# Patient Record
Sex: FEMALE | Race: BLACK OR AFRICAN AMERICAN | NOT HISPANIC OR LATINO | Employment: UNEMPLOYED | URBAN - METROPOLITAN AREA
[De-identification: names, ages, dates, MRNs, and addresses within clinical notes are randomized per-mention and may not be internally consistent; named-entity substitution may affect disease eponyms.]

---

## 2017-05-26 ENCOUNTER — HOSPITAL ENCOUNTER (EMERGENCY)
Facility: HOSPITAL | Age: 37
Discharge: HOME | End: 2017-05-26
Admitting: EMERGENCY MEDICINE
Payer: COMMERCIAL

## 2017-05-26 VITALS
TEMPERATURE: 98.6 F | BODY MASS INDEX: 25.61 KG/M2 | DIASTOLIC BLOOD PRESSURE: 73 MMHG | WEIGHT: 150 LBS | RESPIRATION RATE: 18 BRPM | HEART RATE: 98 BPM | HEIGHT: 64 IN | OXYGEN SATURATION: 100 % | SYSTOLIC BLOOD PRESSURE: 116 MMHG

## 2017-05-26 DIAGNOSIS — K64.9 HEMORRHOIDS, UNSPECIFIED HEMORRHOID TYPE: Primary | ICD-10-CM

## 2017-05-26 DIAGNOSIS — R30.0 DYSURIA: ICD-10-CM

## 2017-05-26 LAB
BILIRUB UR QL STRIP: NEGATIVE
CLARITY UR: CLEAR
COLOR UR: YELLOW
GLUCOSE UR STRIP-MCNC: NEGATIVE MG/DL
HCG UR QL: NORMAL
HGB UR QL STRIP.AUTO: NEGATIVE
KETONES UR STRIP-MCNC: NEGATIVE MG/DL
LEUKOCYTE ESTERASE UR QL STRIP: NEGATIVE
NITRITE UR QL STRIP: NEGATIVE
PH UR STRIP.AUTO: 6 [PH] (ref 4.5–8)
PROT UR STRIP-MCNC: NEGATIVE MG/DL
SP GR UR STRIP.AUTO: 1.02 (ref 1–1.03)
UROBILINOGEN UR QL STRIP.AUTO: 0.2 E.U./DL

## 2017-05-26 PROCEDURE — 81003 URINALYSIS AUTO W/O SCOPE: CPT | Performed by: NURSE PRACTITIONER

## 2017-05-26 PROCEDURE — 81025 URINE PREGNANCY TEST: CPT | Performed by: NURSE PRACTITIONER

## 2017-05-26 PROCEDURE — 99283 EMERGENCY DEPT VISIT LOW MDM: CPT

## 2017-05-26 RX ORDER — HYDROCORTISONE ACETATE 25 MG/1
25 SUPPOSITORY RECTAL 2 TIMES DAILY
Qty: 12 SUPPOSITORY | Refills: 0 | Status: SHIPPED | OUTPATIENT
Start: 2017-05-26 | End: 2017-10-10

## 2017-05-26 RX ORDER — VENLAFAXINE HYDROCHLORIDE 150 MG/1
150 CAPSULE, EXTENDED RELEASE ORAL DAILY
COMMUNITY
End: 2018-06-11 | Stop reason: SDUPTHER

## 2017-05-26 RX ORDER — METHOCARBAMOL 500 MG/1
500 TABLET, FILM COATED ORAL 4 TIMES DAILY
COMMUNITY
End: 2017-10-10

## 2017-05-26 RX ORDER — PHENAZOPYRIDINE HYDROCHLORIDE 100 MG/1
100 TABLET, FILM COATED ORAL 2 TIMES DAILY PRN
Qty: 4 TABLET | Refills: 0 | Status: SHIPPED | OUTPATIENT
Start: 2017-05-26 | End: 2017-05-30

## 2017-05-26 RX ORDER — TRAMADOL HYDROCHLORIDE 50 MG/1
50 TABLET ORAL EVERY 8 HOURS PRN
COMMUNITY
End: 2017-10-10

## 2017-05-26 RX ORDER — GABAPENTIN 300 MG/1
300 CAPSULE ORAL 3 TIMES DAILY
COMMUNITY
End: 2017-10-10

## 2017-06-16 ENCOUNTER — APPOINTMENT (OUTPATIENT)
Dept: LAB | Facility: CLINIC | Age: 37
End: 2017-06-16
Payer: COMMERCIAL

## 2017-06-16 DIAGNOSIS — E78.5 HYPERLIPIDEMIA: ICD-10-CM

## 2017-06-16 LAB
ALBUMIN SERPL BCP-MCNC: 4.1 G/DL (ref 3.5–5)
ALP SERPL-CCNC: 68 U/L (ref 46–116)
ALT SERPL W P-5'-P-CCNC: 19 U/L (ref 12–78)
ANION GAP SERPL CALCULATED.3IONS-SCNC: 7 MMOL/L (ref 4–13)
AST SERPL W P-5'-P-CCNC: 12 U/L (ref 5–45)
BASOPHILS # BLD AUTO: 0.02 THOUSANDS/ΜL (ref 0–0.1)
BASOPHILS NFR BLD AUTO: 0 % (ref 0–1)
BILIRUB SERPL-MCNC: 0.37 MG/DL (ref 0.2–1)
BUN SERPL-MCNC: 21 MG/DL (ref 5–25)
CALCIUM SERPL-MCNC: 9.4 MG/DL (ref 8.3–10.1)
CHLORIDE SERPL-SCNC: 108 MMOL/L (ref 100–108)
CHOLEST SERPL-MCNC: 203 MG/DL (ref 50–200)
CO2 SERPL-SCNC: 25 MMOL/L (ref 21–32)
CREAT SERPL-MCNC: 0.71 MG/DL (ref 0.6–1.3)
EOSINOPHIL # BLD AUTO: 0.06 THOUSAND/ΜL (ref 0–0.61)
EOSINOPHIL NFR BLD AUTO: 1 % (ref 0–6)
ERYTHROCYTE [DISTWIDTH] IN BLOOD BY AUTOMATED COUNT: 14.1 % (ref 11.6–15.1)
GFR SERPL CREATININE-BSD FRML MDRD: >60 ML/MIN/1.73SQ M
GLUCOSE P FAST SERPL-MCNC: 82 MG/DL (ref 65–99)
HCT VFR BLD AUTO: 40 % (ref 34.8–46.1)
HDLC SERPL-MCNC: 53 MG/DL (ref 40–60)
HGB BLD-MCNC: 12.9 G/DL (ref 11.5–15.4)
LDLC SERPL CALC-MCNC: 129 MG/DL (ref 0–100)
LYMPHOCYTES # BLD AUTO: 2.82 THOUSANDS/ΜL (ref 0.6–4.47)
LYMPHOCYTES NFR BLD AUTO: 44 % (ref 14–44)
MCH RBC QN AUTO: 27.3 PG (ref 26.8–34.3)
MCHC RBC AUTO-ENTMCNC: 32.3 G/DL (ref 31.4–37.4)
MCV RBC AUTO: 85 FL (ref 82–98)
MONOCYTES # BLD AUTO: 0.49 THOUSAND/ΜL (ref 0.17–1.22)
MONOCYTES NFR BLD AUTO: 8 % (ref 4–12)
NEUTROPHILS # BLD AUTO: 2.99 THOUSANDS/ΜL (ref 1.85–7.62)
NEUTS SEG NFR BLD AUTO: 47 % (ref 43–75)
NRBC BLD AUTO-RTO: 0 /100 WBCS
PLATELET # BLD AUTO: 288 THOUSANDS/UL (ref 149–390)
PMV BLD AUTO: 10.4 FL (ref 8.9–12.7)
POTASSIUM SERPL-SCNC: 3.6 MMOL/L (ref 3.5–5.3)
PROT SERPL-MCNC: 7.8 G/DL (ref 6.4–8.2)
RBC # BLD AUTO: 4.72 MILLION/UL (ref 3.81–5.12)
SODIUM SERPL-SCNC: 140 MMOL/L (ref 136–145)
TRIGL SERPL-MCNC: 107 MG/DL
TSH SERPL DL<=0.05 MIU/L-ACNC: 2.11 UIU/ML (ref 0.36–3.74)
WBC # BLD AUTO: 6.38 THOUSAND/UL (ref 4.31–10.16)

## 2017-06-16 PROCEDURE — 36415 COLL VENOUS BLD VENIPUNCTURE: CPT

## 2017-06-16 PROCEDURE — 80061 LIPID PANEL: CPT

## 2017-06-16 PROCEDURE — 85025 COMPLETE CBC W/AUTO DIFF WBC: CPT

## 2017-06-16 PROCEDURE — 80053 COMPREHEN METABOLIC PANEL: CPT

## 2017-06-16 PROCEDURE — 84443 ASSAY THYROID STIM HORMONE: CPT

## 2017-06-19 ENCOUNTER — ALLSCRIPTS OFFICE VISIT (OUTPATIENT)
Dept: OTHER | Facility: OTHER | Age: 37
End: 2017-06-19

## 2017-10-10 ENCOUNTER — HOSPITAL ENCOUNTER (EMERGENCY)
Facility: HOSPITAL | Age: 37
Discharge: HOME | End: 2017-10-10
Attending: EMERGENCY MEDICINE | Admitting: EMERGENCY MEDICINE
Payer: COMMERCIAL

## 2017-10-10 ENCOUNTER — APPOINTMENT (EMERGENCY)
Dept: RADIOLOGY | Facility: HOSPITAL | Age: 37
End: 2017-10-10
Payer: COMMERCIAL

## 2017-10-10 VITALS
TEMPERATURE: 98.4 F | RESPIRATION RATE: 16 BRPM | OXYGEN SATURATION: 100 % | HEART RATE: 92 BPM | SYSTOLIC BLOOD PRESSURE: 99 MMHG | DIASTOLIC BLOOD PRESSURE: 62 MMHG | HEIGHT: 64 IN | WEIGHT: 153.66 LBS | BODY MASS INDEX: 26.23 KG/M2

## 2017-10-10 DIAGNOSIS — S63.91XA HAND SPRAIN, RIGHT, INITIAL ENCOUNTER: Primary | ICD-10-CM

## 2017-10-10 PROCEDURE — 73110 X-RAY EXAM OF WRIST: CPT

## 2017-10-10 PROCEDURE — 99283 EMERGENCY DEPT VISIT LOW MDM: CPT

## 2017-10-10 PROCEDURE — 73130 X-RAY EXAM OF HAND: CPT

## 2017-10-11 NOTE — DISCHARGE INSTRUCTIONS
Return to the Emergency Department sooner if increased pain, swelling, numbness, weakness, vomiting, difficulty breathing, redness  Ice, elevate  Sprain   WHAT YOU NEED TO KNOW:   A sprain happens when a ligament is stretched or torn  Ligaments are tough tissues that connect bones  Ligaments support your joints and keep your bones in place  They allow you to lift, lower, or rotate your arms and legs  A sprain may involve one or more ligaments  DISCHARGE INSTRUCTIONS:   Seek care immediately if:   · You have numbness or tingling below the injury, such as in your fingers or toes  · The skin over your sprained area is blue or pale  · Your pain has increased or returned, even after you take pain medicine  Contact your healthcare provider if:   · Your symptoms do not better  · Your swelling has increased or returned  · Your joint becomes more weak or unstable  · You have questions or concerns about your condition or care  Medicines:   · Prescription pain medicine  may be given  Ask how to take this medicine safely  · Acetaminophen  decreases pain and fever  It is available without a doctor's order  Ask how much to take and how often to take it  Follow directions  Acetaminophen can cause liver damage if not taken correctly  · NSAIDs , such as ibuprofen, help decrease swelling, pain, and fever  This medicine is available with or without a doctor's order  NSAIDs can cause stomach bleeding or kidney problems in certain people  If you take blood thinner medicine, always ask your healthcare provider if NSAIDs are safe for you  Always read the medicine label and follow directions  · Take your medicine as directed  Contact your healthcare provider if you think your medicine is not helping or if you have side effects  Tell him or her if you are allergic to any medicine  Keep a list of the medicines, vitamins, and herbs you take  Include the amounts, and when and why you take them   Bring the list or the pill bottles to follow-up visits  Carry your medicine list with you in case of an emergency  Support devices:  Support services, such as an elastic bandage, splint, brace, or cast may be needed  These devices limit your movement and protect your joint  You may need to use crutches if the sprain is in your leg  This will help decrease your pain as you move around  Self-care:   · Rest  your joint so that it can heal  Return to normal activities as directed  · Apply ice  on your injury for 15 to 20 minutes every hour or as directed  Use an ice pack, or put crushed ice in a plastic bag  Cover it with a towel  Ice helps prevent tissue damage and decreases swelling and pain  · Compress  the injured area as directed  Ask your healthcare provider if you should wrap an elastic bandage around your injured ligament  An elastic bandage provides support and helps decrease swelling and movement so your joint can heal      · Elevate  the injured area above the level of your heart as often as you can  This will help decrease swelling and pain  Prop the injured area on pillows or blankets to keep it elevated comfortably  Physical therapy:  A physical therapist teaches you exercises to help improve movement and strength, and to decrease pain  Prevent another sprain:  Regular exercise can strengthen your muscles and help prevent another injury  Do the following before you begin or return to regular exercise or sports training:  · Ask your healthcare provider about the activities you can do  Find out how long your ligament needs to heal  Do not do any physical activity until your healthcare provider says it is okay  If you start activity too soon, you may develop a more serious injury  · Always warm up and stretch  before your regular exercise, sport, or physical activity  · Take it slow  Slowly increase how often and how long you exercise or train   Sudden increases in how often you train may cause you to overstretch or tear your ligament  · Use the right equipment  Always wear shoes that fit well and are made for the activity that you are doing  You may also use ankle supports, elbow and knee pads, or braces  Follow up with your healthcare provider as directed:  Write down your questions so you remember to ask them during your visits  © 2017 [address] [employer] Information is for End User's use only and may not be sold, redistributed or otherwise used for commercial purposes  All illustrations and images included in CareNotes® are the copyrighted property of [employer]  or Luan Miles  The above information is an  only  It is not intended as medical advice for individual conditions or treatments  Talk to your doctor, nurse or pharmacist before following any medical regimen to see if it is safe and effective for you  Jammed Finger   WHAT YOU NEED TO KNOW:   A jammed finger is an injury to the tendon that straightens the tip of your finger  A piece of bone may be pulled away with your tendon  Your injury may take 4 to 8 weeks to heal   DISCHARGE INSTRUCTIONS:   Medicines: You may need any of the following:  · Acetaminophen  decreases pain  It is available without a doctor's order  Ask how much to take and how often to take it  Follow directions  Acetaminophen can cause liver damage if not taken correctly  · NSAIDs , such as ibuprofen, help decrease swelling, pain, and fever  This medicine is available with or without a doctor's order  NSAIDs can cause stomach bleeding or kidney problems in certain people  If you take blood thinner medicine, always ask if NSAIDs are safe for you  Always read the medicine label and follow directions  Do not give these medicines to children under 10months of age without direction from your child's healthcare provider  · Take your medicine as directed    Contact your healthcare provider if you think your medicine is not helping or if you have side effects  Tell him if you are allergic to any medicine  Keep a list of the medicines, vitamins, and herbs you take  Include the amounts, and when and why you take them  Bring the list or the pill bottles to follow-up visits  Carry your medicine list with you in case of an emergency  Self-care:   · Apply ice  on your finger for 15 to 20 minutes every hour or as directed  Use an ice pack, or put crushed ice in a plastic bag  Cover it with a towel  Ice helps prevent tissue damage and decreases swelling and pain  · Elevate  your hand above the level of your heart as often as you can  This will help decrease swelling and pain  Prop your hand on pillows or blankets to keep it elevated comfortably  · Immobilize  your finger  A splint will be placed on your finger to keep it straight while it heals  You may need to wear this splint for 6 to 8 weeks  You may need to continue to use the splint during sports activities for another 6 to 8 weeks  Splint care:   · You may remove the splint each day to wash your finger  · When your splint is off, do not try to bend the tip of your finger  · Put your splint back on as soon as possible  When you apply new tape, make sure the splint is in the same place and position  You may also need new tape if the splint gets wet  If your finger is numb or tingling, the splint may be too tight  Loosen the tape so your finger is comfortable  Contact your healthcare provider if:   · You have pain or swelling that is getting worse  · You have increased redness and swelling in your finger  · Your finger feels numb, tingly, or cold, even after you loosen the splint  · Your finger looks white or blue and feels cold  · You have questions or concerns about your condition or care  Seek care immediately or call 911 if:   · You have blood beneath your nail, or your nail is not fully attached      © 2017 [employer] Information is for End User's use only and may not be sold, redistributed or otherwise used for commercial purposes  All illustrations and images included in CareNotes® are the copyrighted property of [employer]  or Luan Miles  The above information is an  only  It is not intended as medical advice for individual conditions or treatments  Talk to your doctor, nurse or pharmacist before following any medical regimen to see if it is safe and effective for you

## 2017-10-11 NOTE — ED PROVIDER NOTES
History  Chief Complaint   Patient presents with    Hand Pain     Pt c/o right hand, wrist and arm pain after jamming it into a banister railing after tripping  This is a 73-year-old female patient who jammed her right hand into a railing  Pain immediately afterwards shooting up into her elbow  She is right-hand dominant  She has no prior injuries to the right hand  At this point time the pain is improving  She initially had numbness which has since resolved  She still has full range of motion of the right wrist and all digits of the right hand  No swelling, no erythema, no abrasions or lacerations  She is otherwise feeling well  No significant past medical history  Does not take any anticoagulants or antiplatelets        History provided by:  Patient   used: No    Hand Pain   Location:  Right hand  Quality:  Aching  Severity:  Mild  Onset quality:  Gradual  Duration: worse over past 1 day  Timing:  Constant  Progression:  Unchanged  Chronicity:  New  Context:  Jammed finger  Relieved by:  Rest  Worsened by: Movement and palpation  Associated symptoms: no abdominal pain, no chest pain, no congestion, no cough, no diarrhea, no ear pain, no fatigue, no fever, no headaches, no loss of consciousness, no myalgias, no nausea, no rash, no rhinorrhea, no shortness of breath, no sore throat, no vomiting and no wheezing        Prior to Admission Medications   Prescriptions Last Dose Informant Patient Reported? Taking?   venlafaxine (EFFEXOR-XR) 150 mg 24 hr capsule   Yes No   Sig: Take 150 mg by mouth daily      Facility-Administered Medications: None       Past Medical History:   Diagnosis Date    Neurogenic pain     Sleep apnea        History reviewed  No pertinent surgical history  History reviewed  No pertinent family history  I have reviewed and agree with the history as documented      Social History   Substance Use Topics    Smoking status: Never Smoker    Smokeless tobacco: Never Used    Alcohol use No        Review of Systems   Constitutional: Negative for activity change, appetite change, chills, diaphoresis, fatigue, fever and unexpected weight change  HENT: Negative for congestion, ear pain, rhinorrhea, sinus pressure, sore throat and trouble swallowing  Eyes: Negative for photophobia and visual disturbance  Respiratory: Negative for apnea, cough, choking, chest tightness, shortness of breath, wheezing and stridor  Cardiovascular: Negative for chest pain, palpitations and leg swelling  Gastrointestinal: Negative for abdominal distention, abdominal pain, blood in stool, constipation, diarrhea, nausea and vomiting  Genitourinary: Negative for decreased urine volume, difficulty urinating, dysuria, enuresis, flank pain, frequency, hematuria and urgency  Musculoskeletal: Negative for arthralgias, myalgias, neck pain and neck stiffness  Right hand injury   Skin: Negative for color change, pallor, rash and wound  Allergic/Immunologic: Negative  Neurological: Negative for dizziness, tremors, loss of consciousness, syncope, weakness, light-headedness, numbness and headaches  Hematological: Negative  Psychiatric/Behavioral: Negative  All other systems reviewed and are negative  Physical Exam  ED Triage Vitals   Temperature Pulse Respirations Blood Pressure SpO2   10/10/17 2046 10/10/17 2043 10/10/17 2043 10/10/17 2046 10/10/17 2043   98 4 °F (36 9 °C) 92 16 99/62 100 %      Temp Source Heart Rate Source Patient Position - Orthostatic VS BP Location FiO2 (%)   10/10/17 2046 10/10/17 2043 10/10/17 2046 10/10/17 2046 --   Oral Monitor Lying Right arm       Pain Score       10/10/17 2043       6           Physical Exam   Constitutional: She is oriented to person, place, and time  She appears well-developed and well-nourished  Non-toxic appearance  She does not have a sickly appearance  She does not appear ill  No distress     HENT:   Head: Normocephalic and atraumatic  Eyes: EOM and lids are normal  Pupils are equal, round, and reactive to light  Neck: Normal range of motion  Neck supple  Cardiovascular: Normal rate, regular rhythm, S1 normal, S2 normal, normal heart sounds, intact distal pulses and normal pulses  Exam reveals no gallop, no distant heart sounds, no friction rub and no decreased pulses  No murmur heard  Pulses:       Radial pulses are 2+ on the right side, and 2+ on the left side  Pulmonary/Chest: Effort normal and breath sounds normal  No accessory muscle usage  No apnea, no tachypnea and no bradypnea  No respiratory distress  She has no decreased breath sounds  She has no wheezes  She has no rhonchi  She has no rales  Abdominal: Soft  Normal appearance and bowel sounds are normal  She exhibits no distension and no mass  There is no tenderness  There is no rigidity, no rebound and no guarding  No hernia  Musculoskeletal: She exhibits no edema or deformity  Right hand: She exhibits tenderness and bony tenderness  She exhibits normal range of motion, normal two-point discrimination, normal capillary refill, no deformity, no laceration and no swelling  Normal sensation noted  Normal strength noted  Hands:  Normal radial, ulnar, median nerve function  Neurological: She is alert and oriented to person, place, and time  No cranial nerve deficit  GCS eye subscore is 4  GCS verbal subscore is 5  GCS motor subscore is 6  GCS 15  AAOx3  Ambulating in department without difficulty  CN II-XII grossly intact  No focal neuro deficits  Skin: Skin is warm, dry and intact  No rash noted  She is not diaphoretic  No erythema  No pallor  Psychiatric: Her speech is normal    Nursing note and vitals reviewed        ED Medications  Medications - No data to display    Diagnostic Studies  Labs Reviewed - No data to display    XR wrist 3+ views RIGHT   ED Interpretation   No acute osseous abnormalities      Final Result No acute osseous abnormality  Findings concur with the referring clinician's preliminary interpretation already in the patient's electronic health record  Workstation performed: [id number]         XR hand 3+ views RIGHT   ED Interpretation   No acute osseous abnormalities      Final Result      No acute osseous abnormality  Findings concur with the referring clinician's preliminary interpretation already in the patient's electronic health record  Workstation performed: [id number]             Procedures  Procedures      Phone Contacts  ED Phone Contact    ED Course  ED Course                                MDM  Number of Diagnoses or Management Options  Hand sprain, right, initial encounter: new and requires workup  Diagnosis management comments: Differential diagnosis including but not limited to: sprain, strain, fracture, dislocation, contusion  Plan: XR  Analgesia offered, pt declined  Amount and/or Complexity of Data Reviewed  Tests in the radiology section of CPT®: ordered and reviewed  Independent visualization of images, tracings, or specimens: yes    Risk of Complications, Morbidity, and/or Mortality  Presenting problems: low  Management options: low  General comments: 41 y/o female with right hand injury after jamming finger into bannister  Her XR is unremarkable  No acute osseous abnormalities  She is resting comfortably in bed in no distress  Likely contusion  Follow up with orthopedics  RICE  Return parameters provided  Pt understands and agrees with plan  Patient Progress  Patient progress: stable    CritCare Time    Disposition  Final diagnoses:   Hand sprain, right, initial encounter     ED Disposition     ED Disposition Condition Comment    Discharge  [address] discharge to home/self care      Condition at discharge: Good        Follow-up Information     Follow up With Specialties Details Why [address] [facility] in 1 week As needed, If symptoms worsen [address]  [phone]        Discharge Medication List as of 10/10/2017 10:55 PM      CONTINUE these medications which have NOT CHANGED    Details   venlafaxine (EFFEXOR-XR) 150 mg 24 hr capsule Take 150 mg by mouth daily, Until Discontinued, Historical Med           No discharge procedures on file      ED Provider  Electronically Signed by       Garfield Artis PA-C  10/13/17 2239

## 2017-10-12 NOTE — ED PROVIDER NOTES
History  Chief Complaint   Patient presents with    Hand Pain     Pt c/o right hand, wrist and arm pain after jamming it into a banister railing after tripping  HPI    Prior to Admission Medications   Prescriptions Last Dose Informant Patient Reported? Taking?   venlafaxine (EFFEXOR-XR) 150 mg 24 hr capsule   Yes No   Sig: Take 150 mg by mouth daily      Facility-Administered Medications: None       Past Medical History:   Diagnosis Date    Neurogenic pain     Sleep apnea        History reviewed  No pertinent surgical history  History reviewed  No pertinent family history  I have reviewed and agree with the history as documented  Social History   Substance Use Topics    Smoking status: Never Smoker    Smokeless tobacco: Never Used    Alcohol use No        Review of Systems    Physical Exam  ED Triage Vitals   Temperature Pulse Respirations Blood Pressure SpO2   10/10/17 2046 10/10/17 2043 10/10/17 2043 10/10/17 2046 10/10/17 2043   98 4 °F (36 9 °C) 92 16 99/62 100 %      Temp Source Heart Rate Source Patient Position - Orthostatic VS BP Location FiO2 (%)   10/10/17 2046 10/10/17 2043 10/10/17 2046 10/10/17 2046 --   Oral Monitor Lying Right arm       Pain Score       10/10/17 2043       6           Physical Exam    ED Medications  Medications - No data to display    Diagnostic Studies  Labs Reviewed - No data to display    XR wrist 3+ views RIGHT   ED Interpretation   No acute osseous abnormalities      Final Result      No acute osseous abnormality  Findings concur with the referring clinician's preliminary interpretation already in the patient's electronic health record  Workstation performed: [id number]         XR hand 3+ views RIGHT   ED Interpretation   No acute osseous abnormalities      Final Result      No acute osseous abnormality  Findings concur with the referring clinician's preliminary interpretation already in the patient's electronic health record  Workstation performed: [id number]             Procedures  Procedures      Phone Contacts  ED Phone Contact    ED Course  ED Course                                MDM  CritCare Time    Disposition  Final diagnoses:   Hand sprain, right, initial encounter     ED Disposition     ED Disposition Condition Comment    Discharge  [address] discharge to home/self care  Condition at discharge: Good        Follow-up Information     Follow up With Specialties Details Why [address]  Call in 1 week As needed, If symptoms worsen [address] [facility] [address] 75  [phone]        Discharge Medication List as of 10/10/2017 10:55 PM      CONTINUE these medications which have NOT CHANGED    Details   venlafaxine (EFFEXOR-XR) 150 mg 24 hr capsule Take 150 mg by mouth daily, Until Discontinued, Historical Med           No discharge procedures on file      ED Provider  Electronically Signed by       Thiago Santillan MD  10/12/17 3630

## 2017-12-01 DIAGNOSIS — E78.5 HYPERLIPIDEMIA: ICD-10-CM

## 2017-12-18 ENCOUNTER — APPOINTMENT (OUTPATIENT)
Dept: LAB | Facility: CLINIC | Age: 37
End: 2017-12-18
Payer: COMMERCIAL

## 2017-12-18 ENCOUNTER — TRANSCRIBE ORDERS (OUTPATIENT)
Dept: LAB | Facility: CLINIC | Age: 37
End: 2017-12-18

## 2017-12-18 DIAGNOSIS — E78.5 HYPERLIPIDEMIA: ICD-10-CM

## 2017-12-18 LAB
ALBUMIN SERPL BCP-MCNC: 3.9 G/DL (ref 3.5–5)
ALP SERPL-CCNC: 67 U/L (ref 46–116)
ALT SERPL W P-5'-P-CCNC: 26 U/L (ref 12–78)
ANION GAP SERPL CALCULATED.3IONS-SCNC: 7 MMOL/L (ref 4–13)
AST SERPL W P-5'-P-CCNC: 25 U/L (ref 5–45)
BASOPHILS # BLD AUTO: 0.02 THOUSANDS/ΜL (ref 0–0.1)
BASOPHILS NFR BLD AUTO: 0 % (ref 0–1)
BILIRUB SERPL-MCNC: 0.46 MG/DL (ref 0.2–1)
BUN SERPL-MCNC: 16 MG/DL (ref 5–25)
CALCIUM SERPL-MCNC: 8.9 MG/DL (ref 8.3–10.1)
CHLORIDE SERPL-SCNC: 108 MMOL/L (ref 100–108)
CHOLEST SERPL-MCNC: 197 MG/DL (ref 50–200)
CO2 SERPL-SCNC: 22 MMOL/L (ref 21–32)
CREAT SERPL-MCNC: 0.73 MG/DL (ref 0.6–1.3)
EOSINOPHIL # BLD AUTO: 0.04 THOUSAND/ΜL (ref 0–0.61)
EOSINOPHIL NFR BLD AUTO: 1 % (ref 0–6)
ERYTHROCYTE [DISTWIDTH] IN BLOOD BY AUTOMATED COUNT: 13.9 % (ref 11.6–15.1)
GFR SERPL CREATININE-BSD FRML MDRD: 123 ML/MIN/1.73SQ M
GLUCOSE SERPL-MCNC: 94 MG/DL (ref 65–140)
HCT VFR BLD AUTO: 43.6 % (ref 34.8–46.1)
HDLC SERPL-MCNC: 62 MG/DL (ref 40–60)
HGB BLD-MCNC: 14.5 G/DL (ref 11.5–15.4)
LDLC SERPL CALC-MCNC: 119 MG/DL (ref 0–100)
LYMPHOCYTES # BLD AUTO: 2.2 THOUSANDS/ΜL (ref 0.6–4.47)
LYMPHOCYTES NFR BLD AUTO: 35 % (ref 14–44)
MCH RBC QN AUTO: 28.3 PG (ref 26.8–34.3)
MCHC RBC AUTO-ENTMCNC: 33.3 G/DL (ref 31.4–37.4)
MCV RBC AUTO: 85 FL (ref 82–98)
MONOCYTES # BLD AUTO: 0.55 THOUSAND/ΜL (ref 0.17–1.22)
MONOCYTES NFR BLD AUTO: 9 % (ref 4–12)
NEUTROPHILS # BLD AUTO: 3.46 THOUSANDS/ΜL (ref 1.85–7.62)
NEUTS SEG NFR BLD AUTO: 55 % (ref 43–75)
NRBC BLD AUTO-RTO: 0 /100 WBCS
PLATELET # BLD AUTO: 283 THOUSANDS/UL (ref 149–390)
PMV BLD AUTO: 10.9 FL (ref 8.9–12.7)
POTASSIUM SERPL-SCNC: 4.1 MMOL/L (ref 3.5–5.3)
PROT SERPL-MCNC: 7.8 G/DL (ref 6.4–8.2)
RBC # BLD AUTO: 5.12 MILLION/UL (ref 3.81–5.12)
SODIUM SERPL-SCNC: 137 MMOL/L (ref 136–145)
TRIGL SERPL-MCNC: 78 MG/DL
WBC # BLD AUTO: 6.29 THOUSAND/UL (ref 4.31–10.16)

## 2017-12-18 PROCEDURE — 85025 COMPLETE CBC W/AUTO DIFF WBC: CPT

## 2017-12-18 PROCEDURE — 80061 LIPID PANEL: CPT

## 2017-12-18 PROCEDURE — 36415 COLL VENOUS BLD VENIPUNCTURE: CPT

## 2017-12-18 PROCEDURE — 80053 COMPREHEN METABOLIC PANEL: CPT

## 2017-12-19 ENCOUNTER — ALLSCRIPTS OFFICE VISIT (OUTPATIENT)
Dept: OTHER | Facility: OTHER | Age: 37
End: 2017-12-19

## 2017-12-20 NOTE — PROGRESS NOTES
Assessment  1  Allergic rhinitis (477 9) (J30 9)   2  Eustachian tube dysfunction (381 81) (H69 80)   3  Constipation (564 00) (K59 00)   4  Extrinsic asthma (493 00) (J45 909)   5  History of ovarian cyst (V13 29) (Z87 42)   6  Fibromyalgia (729 1) (M79 7)   7  Borderline hyperlipidemia (272 4) (E78 5)    Plan  Borderline hyperlipidemia    · (1) CBC/PLT/DIFF; Status:Active; Requested [id number];    · (1) COMPREHENSIVE METABOLIC PANEL; Status:Active; Requested [id number];    · (1) LIPID PANEL, FASTING; Status:Active; Requested [id number];   Need for influenza vaccination    · Fluzone Quadrivalent Intramuscular Suspension    Discussion/Summary  Discussion Summary:   Lab data reviewed and compared to priorHyperlipidemia has improved with dietary modification and exercise, continue with same no indication for medicationFibromyalgia is stable with successful weaning off of multiple medications, continue with venlafaxineAllergic rhinitis with eustachian tube dysfunction -continue Singulair and fluticasone as needed, consider Claritin D for pressure sensation in the ears constipation-increase fluid intake, increase fiber intake, consider fiber supplement such as Metamucil, increased activity such as walking can help, if still problematic add MiraLax which is available over-the-counterRoutine follow-up after labs in 6 months, sooner as needed  Chief Complaint  Chief Complaint Chronic Condition [facility]: Patient is here today for follow up of chronic conditions described in HPI  History of Present Illness  HPI: Feeling generally well  Looking into ways to secure passive income  Still planning on getting  and starting family, but wants to get financially more secure first Ovarian cyst is gone per u/s in Sept Stopped walking d/t increased pain w/ movement  Keeping active w/ cleaning and yardwork  Still off pain meds, off gabapentin, robaxin, meloxicam and tramadol, still w/ venlafaxine   Still w/ chronic pain syndrome  Allergy/asthma stable w/ prn flonase, singulair and proair  Worse at change of season  Notes 2 wks R ear pain and foul smell to ear plug  Tried sweet oil to clear wax  Still having issues w/ sleep and IBS, but watching diet more closely  Review of Systems  Complete-Female:  Constitutional: No fever, no chills, feels well, no tiredness, no recent weight gain or weight loss  Eyes: No complaints of eye pain, no red eyes, no eyesight problems, no discharge, no dry eyes, no itching of eyes  ENT: no complaints of earache, no loss of hearing, no nose bleeds, no nasal discharge, no sore throat, no hoarseness  Cardiovascular: No complaints of slow heart rate, no fast heart rate, no chest pain, no palpitations, no leg claudication, no lower extremity edema  Respiratory: No complaints of shortness of breath, no wheezing, no cough, no SOB on exertion, no orthopnea, no PND  Gastrointestinal: No complaints of abdominal pain, no constipation, no nausea or vomiting, no diarrhea, no bloody stools  Genitourinary: No complaints of dysuria, no incontinence, no pelvic pain, no dysmenorrhea, no vaginal discharge or bleeding  Musculoskeletal: as noted in HPI  Integumentary: No complaints of skin rash or lesions, no itching, no skin wounds, no breast pain or lump  Neurological: No complaints of headache, no confusion, no convulsions, no numbness, no dizziness or fainting, no tingling, no limb weakness, no difficulty walking  Psychiatric: Not suicidal, no sleep disturbance, no anxiety or depression, no change in personality, no emotional problems  Endocrine: No complaints of proptosis, no hot flashes, no muscle weakness, no deepening of the voice, no feelings of weakness  Hematologic/Lymphatic: No complaints of swollen glands, no swollen glands in the neck, does not bleed easily, does not bruise easily  Active Problems  1  Acne (706 1) (L70 9)   2  Allergic rhinitis (477 9) (J30 9)   3   Anxiety state (300 00) (F41 1)   4  Borderline hyperlipidemia (272 4) (E78 5)   5  Constipation (564 00) (K59 00)   6  External hemorrhoids (455 3) (K64 4)   7  Extrinsic asthma (493 00) (J45 909)   8  Fibromyalgia (729 1) (M79 7)   9  Hemorrhoids (455 6) (K64 9)   10  Joint pain (719 40) (M25 50)   11  Need for influenza vaccination (V04 81) (Z23)   12  Vaginal burning (625 8) (N94 9)    Past Medical History  1  History of Femoral hernia with obstruction (552 00) (K41 30)   2  History of chest pain (V13 89) (Z87 898)   3  History of influenza vaccination (V49 89) (Z92 29)   4  History of low back pain (V13 59) (Z87 39)   5  History of pleural effusion (V12 69) (Z87 09)   6  History of Irregular menses (626 4) (N92 6)   7  History of Malaise and fatigue (780 79) (R53 81,R53 83)   8  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)   9  History of Sacroiliitis (720 2) (M46 1)   10  History of Thymus Disorders (254 9)  Active Problems And Past Medical History Reviewed: The active problems and past medical history were reviewed and updated today  Surgical History  Surgical History Reviewed: The surgical history was reviewed and updated today  Family History  Mother    1  Family history of asthma (V17 5) (Z82 5)   2  Family history of hyperlipidemia (V18 19) (Z83 49)   3  Family history of hypertension (V17 49) (Z82 49)   4  Family history of obesity (V18 19) (Z83 49)  Father    5  Family history of hyperlipidemia (V18 19) (Z83 49)   6  Family history of hypertension (V17 49) (Z82 49)   7  Family history of obesity (V18 19) (Z83 49)   8  Family history of type 2 diabetes mellitus (V18 0) (Z83 3)   9  Family history of valvular heart disease (V17 49) (Z82 49)  Family History Reviewed: The family history was reviewed and updated today         Social History   · Former smoker (A45 72) (P27 574)   · Denied: History of drug use   · Lives with parents ()   · Occupation   · Social alcohol use (Z78 9)   · Denied: History of Tobacco use  Social History Reviewed: The social history was reviewed and updated today  Current Meds   1  Fluticasone Propionate 50 MCG/ACT Nasal Suspension; 2 sprays in each nostril every day; Therapy: 21Twy2714 to (Evaluate:17Jun2017)  Requested for: 90EQC4126; Last Rx:67Hnl5542 Ordered   2  Hydrocortisone 2 5 % External Cream; APPLY 2-3 TIMES DAILY TO AFFECTED AREA(S), perianally; Therapy: 13XXX8572 to (Evaluate:33Gvn9912)  Requested for: 36YHB2751; Last Rx:21Nov2017 Ordered   3  Montelukast Sodium 10 MG Oral Tablet; take 1 tablet by mouth daily; Therapy: 71WEB2721 to (Evaluate:19Mar2017)  Requested for: 84CZF9709; Last Rx:60Xjs7854 Ordered   4  ProAir  (90 Base) MCG/ACT Inhalation Aerosol Solution; USE 2 PUFFS EVERY 6 HOURS AS NEEDED; Therapy: 19ZFH6675 to Recorded   5  Venlafaxine HCl  MG Oral Capsule Extended Release 24 Hour; TAKE 1 CAPSULE EVERY DAY; Therapy: 71FUI9771 to (Pippa Zuleta)  Requested for: 69GGL8933; Last Rx:64Deo8327 Ordered  Medication List Reviewed: The medication list was reviewed and updated today  Allergies  1  No Known Drug Allergies  2  Dust   3  Pollen   4  Seasonal   5  Trees    Vitals  Vital Signs    Recorded: 19Dec2017 04:14PM   Heart Rate 74   Respiration 12   Systolic 556   Diastolic 70   Height 5 ft 4 in   Weight 150 lb 4 oz   BMI Calculated 25 79   BSA Calculated 1 73     Physical Exam   Constitutional  General appearance: No acute distress, well appearing and well nourished  Eyes  Conjunctiva and lids: No swelling, erythema or discharge  Pupils and irises: Equal, round and reactive to light  Ears, Nose, Mouth, and Throat  External inspection of ears and nose: Normal    Otoscopic examination: Tympanic membranes translucent with normal light reflex  Canals patent without erythema  Nasal mucosa, septum, and turbinates: Normal without edema or erythema     Oropharynx: Normal with no erythema, edema, exudate or lesions  Pulmonary  Respiratory effort: No increased work of breathing or signs of respiratory distress  Auscultation of lungs: Clear to auscultation  Cardiovascular  Auscultation of heart: Normal rate and rhythm, normal S1 and S2, without murmurs  Examination of extremities for edema and/or varicosities: Normal    Carotid pulses: Normal    Abdomen  Abdomen: Non-tender, no masses  Liver and spleen: No hepatomegaly or splenomegaly  Lymphatic  Palpation of lymph nodes in neck: No lymphadenopathy  Musculoskeletal  Gait and station: Normal    Skin  Skin and subcutaneous tissue: Normal without rashes or lesions  Neurologic  Cranial nerves: Cranial nerves 2-12 intact  Psychiatric  Orientation to person, place, and time: Normal    Mood and affect: Normal          Results/Data  (1) LIPID PANEL, FASTING 23HBM8756 05:48PM [employer] Order Number: [id number]     Test Name Result Flag Reference   CHOLESTEROL 197 mg/dL     HDL,DIRECT 62 mg/dL H 40-60   Specimen collection should occur prior to Metamizole administration due to the potential for falsley depressed results  LDL CHOLESTEROL CALCULATED 119 mg/dL H 0-100   Triglyceride:       Normal <150 mg/dl  Borderline High 150-199 mg/dl  High 200-499 mg/dl  Very High >499 mg/dl   Cholesterol:      Desirable <200 mg/dl   Borderline High 200-239 mg/dl   High >239 mg/dl   HDL Cholesterol:      High>59 mg/dL   Low <41 mg/dL   This screening LDL is a calculated result  It does not have the accuracy of the Direct Measured LDL in the monitoring of patients with hyperlipidemia and/or statin therapy  Direct Measure LDL (LUI092) must be ordered separately in these patients  TRIGLYCERIDES 78 mg/dL  <=150   Specimen collection should occur prior to N-Acetylcysteine or Metamizole administration due to the potential for falsely depressed results       (1) CBC/PLT/DIFF 39CCA5229 05:48PM [employer] Order Number: [id number] Test Name Result Flag Reference   WBC COUNT 6 29 Thousand/uL  4 31-10 16   RBC COUNT 5 12 Million/uL  3 81-5 12   HEMOGLOBIN 14 5 g/dL  11 5-15 4   HEMATOCRIT 43 6 %  34 8-46  1   MCV 85 fL  82-98   MCH 28 3 pg  26 8-34 3   MCHC 33 3 g/dL  31 4-37 4   RDW 13 9 %  11 6-15 1   MPV 10 9 fL  8 9-12 7   PLATELET COUNT 467 Thousands/uL  149-390   nRBC AUTOMATED 0 /100 WBCs     NEUTROPHILS RELATIVE PERCENT 55 %  43-75   LYMPHOCYTES RELATIVE PERCENT 35 %  14-44   MONOCYTES RELATIVE PERCENT 9 %  4-12   EOSINOPHILS RELATIVE PERCENT 1 %  0-6   BASOPHILS RELATIVE PERCENT 0 %  0-1   NEUTROPHILS ABSOLUTE COUNT 3 46 Thousands/? ??L  1 85-7 62   LYMPHOCYTES ABSOLUTE COUNT 2 20 Thousands/? ??L  0 60-4 47   MONOCYTES ABSOLUTE COUNT 0 55 Thousand/? ??L  0 17-1 22   EOSINOPHILS ABSOLUTE COUNT 0 04 Thousand/? ??L  0 00-0 61   BASOPHILS ABSOLUTE COUNT 0 02 Thousands/? ??L  0 00-0 10     (1) COMPREHENSIVE METABOLIC PANEL 06IMY7853 90:78AZ [employer] Order Number: [id number]     Test Name Result Flag Reference   GLUCOSE,RANDM 94 mg/dL     If the patient is fasting, the ADA then defines impaired fasting glucose as > 100 mg/dL and diabetes as > or equal to 123 mg/dL  Specimen collection should occur prior to Sulfasalazine administration due to the potential for falsely depressed results  Specimen collection should occur prior to Sulfapyridine administration due to the potential for falsely elevated results  SODIUM 137 mmol/L  136-145   POTASSIUM 4 1 mmol/L  3 5-5 3   Slightly Hemolyzed;  Results May be Affected   CHLORIDE 108 mmol/L  100-108   CARBON DIOXIDE 22 mmol/L  21-32   ANION GAP (CALC) 7 mmol/L  4-13   BLOOD UREA NITROGEN 16 mg/dL  5-25   CREATININE 0 73 mg/dL  0 60-1 30   Standardized to IDMS reference method   CALCIUM 8 9 mg/dL  8 3-10 1   BILI, TOTAL 0 46 mg/dL  0 20-1 00   ALK PHOSPHATAS 67 U/L     ALT (SGPT) 26 U/L  12-78   Specimen collection should occur prior to Sulfasalazine and/or Sulfapyridine administration due to the potential for falsely depressed results  AST(SGOT) 25 U/L  5-45   Slightly Hemolyzed; Results May be Affected Specimen collection should occur prior to Sulfasalazine administration due to the potential for falsely depressed results  ALBUMIN 3 9 g/dL  3 5-5 0   TOTAL PROTEIN 7 8 g/dL  6 4-8 2   eGFR 123 ml/min/1 73sq m     [facility] Disease Education Program recommendations are as follows: GFR calculation is accurate only with a steady state creatinine Chronic Kidney disease less than 60 ml/min/1 73 sq  meters Kidney failure less than 15 ml/min/1 73 sq  meters  Health Management  Health Maintenance   Influenza (Split); every 1 year; Last 29ICE1160; Next Due: 79HFH5339;  Overdue    Signatures   Electronically signed by : CAROLANN Rios ; Dec 19 2017  4:50PM EST                       (Author)

## 2018-01-15 VITALS
HEIGHT: 64 IN | SYSTOLIC BLOOD PRESSURE: 110 MMHG | HEART RATE: 82 BPM | BODY MASS INDEX: 25.63 KG/M2 | RESPIRATION RATE: 14 BRPM | WEIGHT: 150.13 LBS | DIASTOLIC BLOOD PRESSURE: 70 MMHG

## 2018-01-23 VITALS
HEART RATE: 74 BPM | SYSTOLIC BLOOD PRESSURE: 102 MMHG | DIASTOLIC BLOOD PRESSURE: 70 MMHG | RESPIRATION RATE: 12 BRPM | HEIGHT: 64 IN | BODY MASS INDEX: 25.65 KG/M2 | WEIGHT: 150.25 LBS

## 2018-04-01 DIAGNOSIS — M79.7 FIBROMYALGIA: Primary | ICD-10-CM

## 2018-04-02 RX ORDER — MELOXICAM 15 MG/1
TABLET ORAL
Qty: 30 TABLET | Refills: 2 | Status: SHIPPED | OUTPATIENT
Start: 2018-04-02 | End: 2018-06-07

## 2018-05-09 DIAGNOSIS — M79.7 FIBROMYALGIA: Primary | ICD-10-CM

## 2018-05-09 RX ORDER — GABAPENTIN 300 MG/1
CAPSULE ORAL
Qty: 270 CAPSULE | Refills: 2 | Status: SHIPPED | OUTPATIENT
Start: 2018-05-09 | End: 2018-06-07

## 2018-06-01 DIAGNOSIS — E78.5 HYPERLIPIDEMIA: ICD-10-CM

## 2018-06-05 ENCOUNTER — APPOINTMENT (OUTPATIENT)
Dept: LAB | Facility: CLINIC | Age: 38
End: 2018-06-05
Payer: COMMERCIAL

## 2018-06-05 DIAGNOSIS — E78.5 HYPERLIPIDEMIA: ICD-10-CM

## 2018-06-05 LAB
ALBUMIN SERPL BCP-MCNC: 4.1 G/DL (ref 3.5–5)
ALP SERPL-CCNC: 75 U/L (ref 46–116)
ALT SERPL W P-5'-P-CCNC: 17 U/L (ref 12–78)
ANION GAP SERPL CALCULATED.3IONS-SCNC: 9 MMOL/L (ref 4–13)
AST SERPL W P-5'-P-CCNC: 13 U/L (ref 5–45)
BASOPHILS # BLD AUTO: 0.03 THOUSANDS/ΜL (ref 0–0.1)
BASOPHILS NFR BLD AUTO: 1 % (ref 0–1)
BILIRUB SERPL-MCNC: 0.38 MG/DL (ref 0.2–1)
BUN SERPL-MCNC: 12 MG/DL (ref 5–25)
CALCIUM SERPL-MCNC: 9 MG/DL (ref 8.3–10.1)
CHLORIDE SERPL-SCNC: 108 MMOL/L (ref 100–108)
CHOLEST SERPL-MCNC: 185 MG/DL (ref 50–200)
CO2 SERPL-SCNC: 22 MMOL/L (ref 21–32)
CREAT SERPL-MCNC: 0.82 MG/DL (ref 0.6–1.3)
EOSINOPHIL # BLD AUTO: 0.07 THOUSAND/ΜL (ref 0–0.61)
EOSINOPHIL NFR BLD AUTO: 1 % (ref 0–6)
ERYTHROCYTE [DISTWIDTH] IN BLOOD BY AUTOMATED COUNT: 13.6 % (ref 11.6–15.1)
GFR SERPL CREATININE-BSD FRML MDRD: 106 ML/MIN/1.73SQ M
GLUCOSE P FAST SERPL-MCNC: 84 MG/DL (ref 65–99)
HCT VFR BLD AUTO: 42.9 % (ref 34.8–46.1)
HDLC SERPL-MCNC: 51 MG/DL (ref 40–60)
HGB BLD-MCNC: 13.6 G/DL (ref 11.5–15.4)
IMM GRANULOCYTES # BLD AUTO: 0.01 THOUSAND/UL (ref 0–0.2)
IMM GRANULOCYTES NFR BLD AUTO: 0 % (ref 0–2)
LDLC SERPL CALC-MCNC: 112 MG/DL (ref 0–100)
LYMPHOCYTES # BLD AUTO: 1.74 THOUSANDS/ΜL (ref 0.6–4.47)
LYMPHOCYTES NFR BLD AUTO: 32 % (ref 14–44)
MCH RBC QN AUTO: 27.8 PG (ref 26.8–34.3)
MCHC RBC AUTO-ENTMCNC: 31.7 G/DL (ref 31.4–37.4)
MCV RBC AUTO: 88 FL (ref 82–98)
MONOCYTES # BLD AUTO: 0.41 THOUSAND/ΜL (ref 0.17–1.22)
MONOCYTES NFR BLD AUTO: 8 % (ref 4–12)
NEUTROPHILS # BLD AUTO: 3.16 THOUSANDS/ΜL (ref 1.85–7.62)
NEUTS SEG NFR BLD AUTO: 58 % (ref 43–75)
NONHDLC SERPL-MCNC: 134 MG/DL
NRBC BLD AUTO-RTO: 0 /100 WBCS
PLATELET # BLD AUTO: 279 THOUSANDS/UL (ref 149–390)
PMV BLD AUTO: 10.6 FL (ref 8.9–12.7)
POTASSIUM SERPL-SCNC: 3.3 MMOL/L (ref 3.5–5.3)
PROT SERPL-MCNC: 7.7 G/DL (ref 6.4–8.2)
RBC # BLD AUTO: 4.9 MILLION/UL (ref 3.81–5.12)
SODIUM SERPL-SCNC: 139 MMOL/L (ref 136–145)
TRIGL SERPL-MCNC: 108 MG/DL
WBC # BLD AUTO: 5.42 THOUSAND/UL (ref 4.31–10.16)

## 2018-06-05 PROCEDURE — 85025 COMPLETE CBC W/AUTO DIFF WBC: CPT

## 2018-06-05 PROCEDURE — 80061 LIPID PANEL: CPT

## 2018-06-05 PROCEDURE — 80053 COMPREHEN METABOLIC PANEL: CPT

## 2018-06-05 PROCEDURE — 36415 COLL VENOUS BLD VENIPUNCTURE: CPT

## 2018-06-06 RX ORDER — MONTELUKAST SODIUM 10 MG/1
1 TABLET ORAL DAILY
COMMUNITY
Start: 2014-11-25 | End: 2018-06-07 | Stop reason: SDUPTHER

## 2018-06-06 RX ORDER — ALBUTEROL SULFATE 90 UG/1
AEROSOL, METERED RESPIRATORY (INHALATION)
COMMUNITY
Start: 2014-05-16

## 2018-06-07 ENCOUNTER — OFFICE VISIT (OUTPATIENT)
Dept: INTERNAL MEDICINE CLINIC | Facility: CLINIC | Age: 38
End: 2018-06-07
Payer: COMMERCIAL

## 2018-06-07 VITALS
OXYGEN SATURATION: 99 % | HEIGHT: 64 IN | WEIGHT: 148 LBS | HEART RATE: 68 BPM | SYSTOLIC BLOOD PRESSURE: 98 MMHG | DIASTOLIC BLOOD PRESSURE: 68 MMHG | BODY MASS INDEX: 25.27 KG/M2

## 2018-06-07 DIAGNOSIS — M79.7 FIBROMYALGIA: ICD-10-CM

## 2018-06-07 DIAGNOSIS — J45.30 MILD PERSISTENT ASTHMA, UNSPECIFIED WHETHER COMPLICATED: Primary | ICD-10-CM

## 2018-06-07 DIAGNOSIS — J45.20 MILD INTERMITTENT EXTRINSIC ASTHMA WITHOUT COMPLICATION: ICD-10-CM

## 2018-06-07 DIAGNOSIS — E87.6 HYPOKALEMIA: ICD-10-CM

## 2018-06-07 DIAGNOSIS — F41.1 ANXIETY STATE: ICD-10-CM

## 2018-06-07 DIAGNOSIS — J30.1 SEASONAL ALLERGIC RHINITIS DUE TO POLLEN: ICD-10-CM

## 2018-06-07 PROCEDURE — 99214 OFFICE O/P EST MOD 30 MIN: CPT | Performed by: INTERNAL MEDICINE

## 2018-06-07 RX ORDER — MONTELUKAST SODIUM 10 MG/1
10 TABLET ORAL DAILY
Qty: 30 TABLET | Refills: 5 | Status: SHIPPED | OUTPATIENT
Start: 2018-06-07 | End: 2018-12-04 | Stop reason: SDUPTHER

## 2018-06-07 NOTE — PROGRESS NOTES
Assessment/Plan:    Patient Instructions   Lab data reviewed in detail and compared prior    Fibromyalgia remains stable on present regimen after having weaned Neurontin and anti-inflammatories  Will attempt to wean off of the venlafaxine  As follows:  150 mg / 150 mg/ 75 mg for 1 week  75 mg alternating with 150 mg for 2 weeks  75 mg daily until seen in follow-up    Monitor for symptoms of depression, anxiety, increasing pain or medication withdrawal such as sweats, nausea confusion or other      hypokalemia is of unclear etiology -increase potassium in her diet with bananas, avocados, potatoes etc check basic metabolic panel in early July and follow up with me in approximately 1 month      Asthma allergy appears stable on present regimen         Diagnoses and all orders for this visit:    Mild persistent asthma, unspecified whether complicated  -     montelukast (SINGULAIR) 10 mg tablet; Take 1 tablet (10 mg total) by mouth daily    Hypokalemia  -     Magnesium; Future  -     Basic metabolic panel; Future    Seasonal allergic rhinitis due to pollen    Mild intermittent extrinsic asthma without complication    Anxiety state    Fibromyalgia    Other orders  -     Discontinue: montelukast (SINGULAIR) 10 mg tablet; Take 1 tablet by mouth daily  -     Cholecalciferol (PA VITAMIN D-3) 2000 units CAPS; Take by mouth  -     albuterol (PROAIR HFA) 90 mcg/act inhaler; Inhale         Subjective:      Patient ID: Gavin Napoles is a 40 y o  female    Feeling well, still very fatigued  Exercising more x 2 mos, walking outside x 30 min 2x per week  Manageable muscle soreness p walking  Went dancing x 1 on heals and felt weak and fatigued x 4 days  ADL's still cause burning pain, like brushing hair, trimming nails, folding clothes, dishes etc     Working toward real estate license  Allergy/asthma stable, not using singulair dialy, proair ~1x per mo  Depression stable on venlafaxine, wants to cut dose    Wants to plan to conceive  Currently using w/d method for birth control             Current Outpatient Prescriptions:     albuterol (PROAIR HFA) 90 mcg/act inhaler, Inhale, Disp: , Rfl:     Cholecalciferol (PA VITAMIN D-3) 2000 units CAPS, Take by mouth, Disp: , Rfl:     montelukast (SINGULAIR) 10 mg tablet, Take 1 tablet (10 mg total) by mouth daily, Disp: 30 tablet, Rfl: 5    venlafaxine (EFFEXOR-XR) 150 mg 24 hr capsule, Take 150 mg by mouth daily, Disp: , Rfl:     Recent Results (from the past 1008 hour(s))   Lipid panel    Collection Time: 06/05/18  3:01 PM   Result Value Ref Range    Cholesterol 185 50 - 200 mg/dL    Triglycerides 108 <=150 mg/dL    HDL, Direct 51 40 - 60 mg/dL    LDL Calculated 112 (H) 0 - 100 mg/dL    Non-HDL-Chol (CHOL-HDL) 134 mg/dl   Comprehensive metabolic panel    Collection Time: 06/05/18  3:01 PM   Result Value Ref Range    Sodium 139 136 - 145 mmol/L    Potassium 3 3 (L) 3 5 - 5 3 mmol/L    Chloride 108 100 - 108 mmol/L    CO2 22 21 - 32 mmol/L    Anion Gap 9 4 - 13 mmol/L    BUN 12 5 - 25 mg/dL    Creatinine 0 82 0 60 - 1 30 mg/dL    Glucose, Fasting 84 65 - 99 mg/dL    Calcium 9 0 8 3 - 10 1 mg/dL    AST 13 5 - 45 U/L    ALT 17 12 - 78 U/L    Alkaline Phosphatase 75 46 - 116 U/L    Total Protein 7 7 6 4 - 8 2 g/dL    Albumin 4 1 3 5 - 5 0 g/dL    Total Bilirubin 0 38 0 20 - 1 00 mg/dL    eGFR 106 ml/min/1 73sq m   CBC and differential    Collection Time: 06/05/18  3:01 PM   Result Value Ref Range    WBC 5 42 4 31 - 10 16 Thousand/uL    RBC 4 90 3 81 - 5 12 Million/uL    Hemoglobin 13 6 11 5 - 15 4 g/dL    Hematocrit 42 9 34 8 - 46 1 %    MCV 88 82 - 98 fL    MCH 27 8 26 8 - 34 3 pg    MCHC 31 7 31 4 - 37 4 g/dL    RDW 13 6 11 6 - 15 1 %    MPV 10 6 8 9 - 12 7 fL    Platelets 899 734 - 572 Thousands/uL    nRBC 0 /100 WBCs    Neutrophils Relative 58 43 - 75 %    Immat GRANS % 0 0 - 2 %    Lymphocytes Relative 32 14 - 44 %    Monocytes Relative 8 4 - 12 %    Eosinophils Relative 1 0 - 6 % Basophils Relative 1 0 - 1 %    Neutrophils Absolute 3 16 1 85 - 7 62 Thousands/µL    Immature Grans Absolute 0 01 0 00 - 0 20 Thousand/uL    Lymphocytes Absolute 1 74 0 60 - 4 47 Thousands/µL    Monocytes Absolute 0 41 0 17 - 1 22 Thousand/µL    Eosinophils Absolute 0 07 0 00 - 0 61 Thousand/µL    Basophils Absolute 0 03 0 00 - 0 10 Thousands/µL       The following portions of the patient's history were reviewed and updated as appropriate: allergies, current medications, past family history, past medical history, past social history, past surgical history and problem list      Review of Systems   Constitutional: Negative for appetite change, chills, diaphoresis, fatigue, fever and unexpected weight change  HENT: Negative for congestion, hearing loss and rhinorrhea  Eyes: Negative for visual disturbance  Respiratory: Negative for cough, chest tightness, shortness of breath and wheezing  Cardiovascular: Negative for chest pain, palpitations and leg swelling  Gastrointestinal: Negative for abdominal pain and blood in stool  Endocrine: Negative for cold intolerance, heat intolerance, polydipsia and polyuria  Genitourinary: Negative for difficulty urinating, dysuria, frequency and urgency  Musculoskeletal: Negative for arthralgias and myalgias  Skin: Negative for rash  Neurological: Negative for dizziness, weakness, light-headedness and headaches  Hematological: Does not bruise/bleed easily  Psychiatric/Behavioral: Negative for dysphoric mood and sleep disturbance  Objective:      Vitals:    06/07/18 1604   BP: 98/68   Pulse: 68   SpO2: 99%          Physical Exam   Constitutional: She is oriented to person, place, and time  She appears well-developed and well-nourished  HENT:   Head: Normocephalic and atraumatic  Nose: Nose normal    Mouth/Throat: Oropharynx is clear and moist    Eyes: Conjunctivae and EOM are normal  Pupils are equal, round, and reactive to light   No scleral icterus  Neck: Normal range of motion  Neck supple  No JVD present  No tracheal deviation present  No thyromegaly present  Cardiovascular: Normal rate, regular rhythm and intact distal pulses  Exam reveals no gallop and no friction rub  No murmur heard  Pulmonary/Chest: Effort normal and breath sounds normal  No respiratory distress  She has no wheezes  She has no rales  Abdominal: Soft  Bowel sounds are normal  She exhibits no distension and no mass  There is no tenderness  There is no rebound and no guarding  Musculoskeletal: She exhibits no edema or tenderness  Lymphadenopathy:     She has no cervical adenopathy  Neurological: She is alert and oriented to person, place, and time  No cranial nerve deficit  Skin: Skin is warm and dry  No rash noted  No erythema  Psychiatric: She has a normal mood and affect   Her behavior is normal  Judgment and thought content normal

## 2018-06-07 NOTE — PATIENT INSTRUCTIONS
Lab data reviewed in detail and compared prior    Fibromyalgia remains stable on present regimen after having weaned Neurontin and anti-inflammatories    Will attempt to wean off of the venlafaxine  As follows:  150 mg / 150 mg/ 75 mg for 1 week  75 mg alternating with 150 mg for 2 weeks  75 mg daily until seen in follow-up    Monitor for symptoms of depression, anxiety, increasing pain or medication withdrawal such as sweats, nausea confusion or other      hypokalemia is of unclear etiology -increase potassium in her diet with bananas, avocados, potatoes etc check basic metabolic panel in early July and follow up with me in approximately 1 month      Asthma allergy appears stable on present regimen

## 2018-06-11 DIAGNOSIS — F41.9 ANXIETY: Primary | ICD-10-CM

## 2018-06-11 RX ORDER — VENLAFAXINE HYDROCHLORIDE 150 MG/1
CAPSULE, EXTENDED RELEASE ORAL
Qty: 30 CAPSULE | Refills: 5 | Status: SHIPPED | OUTPATIENT
Start: 2018-06-11 | End: 2018-06-13

## 2018-06-13 ENCOUNTER — TELEPHONE (OUTPATIENT)
Dept: INTERNAL MEDICINE CLINIC | Facility: CLINIC | Age: 38
End: 2018-06-13

## 2018-06-13 DIAGNOSIS — F41.9 ANXIETY: ICD-10-CM

## 2018-06-13 RX ORDER — VENLAFAXINE HYDROCHLORIDE 75 MG/1
75 CAPSULE, EXTENDED RELEASE ORAL DAILY
Qty: 30 CAPSULE | Refills: 5 | Status: SHIPPED | OUTPATIENT
Start: 2018-06-13 | End: 2018-07-19 | Stop reason: SDUPTHER

## 2018-06-13 NOTE — TELEPHONE ENCOUNTER
A script was sent in for patient's Venlafaxine 150 mg 24 hour release capsules on 6/11  Patient said that Dr Nya Merino changed the script to 75 mg  Could this be corrected and sent back to the Pharmacy    Please advise    Maricruz Etienne

## 2018-07-02 ENCOUNTER — APPOINTMENT (OUTPATIENT)
Dept: LAB | Facility: CLINIC | Age: 38
End: 2018-07-02
Payer: COMMERCIAL

## 2018-07-02 DIAGNOSIS — E87.6 HYPOKALEMIA: ICD-10-CM

## 2018-07-02 LAB
ANION GAP SERPL CALCULATED.3IONS-SCNC: 7 MMOL/L (ref 4–13)
BUN SERPL-MCNC: 13 MG/DL (ref 5–25)
CALCIUM SERPL-MCNC: 8.8 MG/DL (ref 8.3–10.1)
CHLORIDE SERPL-SCNC: 110 MMOL/L (ref 100–108)
CO2 SERPL-SCNC: 23 MMOL/L (ref 21–32)
CREAT SERPL-MCNC: 0.69 MG/DL (ref 0.6–1.3)
GFR SERPL CREATININE-BSD FRML MDRD: 130 ML/MIN/1.73SQ M
GLUCOSE SERPL-MCNC: 91 MG/DL (ref 65–140)
MAGNESIUM SERPL-MCNC: 1.9 MG/DL (ref 1.6–2.6)
POTASSIUM SERPL-SCNC: 3.6 MMOL/L (ref 3.5–5.3)
SODIUM SERPL-SCNC: 140 MMOL/L (ref 136–145)

## 2018-07-02 PROCEDURE — 83735 ASSAY OF MAGNESIUM: CPT

## 2018-07-02 PROCEDURE — 36415 COLL VENOUS BLD VENIPUNCTURE: CPT

## 2018-07-02 PROCEDURE — 80048 BASIC METABOLIC PNL TOTAL CA: CPT

## 2018-07-07 DIAGNOSIS — M79.7 FIBROMYALGIA: ICD-10-CM

## 2018-07-08 RX ORDER — MELOXICAM 15 MG/1
TABLET ORAL
Qty: 30 TABLET | Refills: 2 | Status: SHIPPED | OUTPATIENT
Start: 2018-07-08 | End: 2018-07-19

## 2018-07-17 RX ORDER — GABAPENTIN 300 MG/1
300 CAPSULE ORAL 3 TIMES DAILY
Refills: 2 | COMMUNITY
Start: 2018-06-11 | End: 2018-07-19

## 2018-07-19 ENCOUNTER — OFFICE VISIT (OUTPATIENT)
Dept: INTERNAL MEDICINE CLINIC | Facility: CLINIC | Age: 38
End: 2018-07-19
Payer: COMMERCIAL

## 2018-07-19 VITALS
WEIGHT: 152 LBS | DIASTOLIC BLOOD PRESSURE: 66 MMHG | HEIGHT: 64 IN | HEART RATE: 74 BPM | BODY MASS INDEX: 25.95 KG/M2 | SYSTOLIC BLOOD PRESSURE: 94 MMHG

## 2018-07-19 DIAGNOSIS — M79.7 FIBROMYALGIA: ICD-10-CM

## 2018-07-19 DIAGNOSIS — J45.20 MILD INTERMITTENT EXTRINSIC ASTHMA WITHOUT COMPLICATION: Primary | ICD-10-CM

## 2018-07-19 DIAGNOSIS — F41.9 ANXIETY: ICD-10-CM

## 2018-07-19 DIAGNOSIS — F41.1 ANXIETY STATE: ICD-10-CM

## 2018-07-19 DIAGNOSIS — Z00.00 WELL ADULT EXAM: ICD-10-CM

## 2018-07-19 DIAGNOSIS — E87.6 HYPOKALEMIA: ICD-10-CM

## 2018-07-19 PROCEDURE — 3008F BODY MASS INDEX DOCD: CPT | Performed by: INTERNAL MEDICINE

## 2018-07-19 PROCEDURE — 99214 OFFICE O/P EST MOD 30 MIN: CPT | Performed by: INTERNAL MEDICINE

## 2018-07-19 PROCEDURE — 1036F TOBACCO NON-USER: CPT | Performed by: INTERNAL MEDICINE

## 2018-07-19 RX ORDER — VENLAFAXINE HYDROCHLORIDE 75 MG/1
75 CAPSULE, EXTENDED RELEASE ORAL DAILY
Qty: 30 CAPSULE | Refills: 5 | Status: SHIPPED | OUTPATIENT
Start: 2018-07-19 | End: 2019-06-12 | Stop reason: SDUPTHER

## 2018-07-19 NOTE — PATIENT INSTRUCTIONS
Lab data reviewed, hypokalemia has normalized, continue on increased potassium in diet especially in the hot humid weather     Anxiety coupled with fibromyalgia- will stay at 75 mg of venlafaxine until you feel you are stable to try further dose reduction then follow-up for prescription for 37 and 0 5 mg tablets and we will devise a gradual tapering regimen to hopefully come off of the medication  Asthma -lungs are clear, continue with Singulair with ProAir as needed    Routine follow-up after labs in December, but sooner as needed for venlafaxine dose adjustment

## 2018-07-19 NOTE — PROGRESS NOTES
Assessment/Plan:    Patient Instructions   Lab data reviewed, hypokalemia has normalized, continue on increased potassium in diet especially in the hot humid weather     Anxiety coupled with fibromyalgia- will stay at 75 mg of venlafaxine until you feel you are stable to try further dose reduction then follow-up for prescription for 37 and 0 5 mg tablets and we will devise a gradual tapering regimen to hopefully come off of the medication  Asthma -lungs are clear, continue with Singulair with ProAir as needed    Routine follow-up after labs in December, but sooner as needed for venlafaxine dose adjustment  Diagnoses and all orders for this visit:    Mild intermittent extrinsic asthma without complication    Anxiety  -     venlafaxine (EFFEXOR-XR) 75 mg 24 hr capsule; Take 1 capsule (75 mg total) by mouth daily    Anxiety state  -     TSH, 3rd generation with Free T4 reflex; Future    Fibromyalgia  -     CBC; Future  -     Comprehensive metabolic panel; Future    Well adult exam  -     Lipid panel; Future    Hypokalemia    Other orders  -     Discontinue: gabapentin (NEURONTIN) 300 mg capsule; Take 300 mg by mouth 3 (three) times a day         Subjective:      Patient ID: Daniel Nam is a 40 y o  female    Feeling ok  Tapered down to 75 mg daily  Dealing w/ death of step sister this week  Stopped gabapentin last yr  No meloxicam in a few mos  Asthma-still using singulair daily, proair ~2x per mo  Still interested in becoming pregnant, but wants to be off venlafaxine first  Doesn't think she is ready to decrease venlafaxine now b/c at end of dose interval she becomes dizzy and nauseous                Current Outpatient Prescriptions:     albuterol (PROAIR HFA) 90 mcg/act inhaler, Inhale, Disp: , Rfl:     Cholecalciferol (PA VITAMIN D-3) 2000 units CAPS, Take by mouth, Disp: , Rfl:     montelukast (SINGULAIR) 10 mg tablet, Take 1 tablet (10 mg total) by mouth daily, Disp: 30 tablet, Rfl: 5   venlafaxine (EFFEXOR-XR) 75 mg 24 hr capsule, Take 1 capsule (75 mg total) by mouth daily, Disp: 30 capsule, Rfl: 5    Recent Results (from the past 1008 hour(s))   Magnesium    Collection Time: 07/02/18  4:22 PM   Result Value Ref Range    Magnesium 1 9 1 6 - 2 6 mg/dL   Basic metabolic panel    Collection Time: 07/02/18  4:22 PM   Result Value Ref Range    Sodium 140 136 - 145 mmol/L    Potassium 3 6 3 5 - 5 3 mmol/L    Chloride 110 (H) 100 - 108 mmol/L    CO2 23 21 - 32 mmol/L    Anion Gap 7 4 - 13 mmol/L    BUN 13 5 - 25 mg/dL    Creatinine 0 69 0 60 - 1 30 mg/dL    Glucose 91 65 - 140 mg/dL    Calcium 8 8 8 3 - 10 1 mg/dL    eGFR 130 ml/min/1 73sq m       The following portions of the patient's history were reviewed and updated as appropriate: allergies, current medications, past family history, past medical history, past social history, past surgical history and problem list      Review of Systems   Constitutional: Negative for appetite change, chills, diaphoresis, fatigue, fever and unexpected weight change  HENT: Negative for congestion, hearing loss and rhinorrhea  Eyes: Negative for visual disturbance  Respiratory: Positive for shortness of breath  Negative for cough, chest tightness and wheezing  Cardiovascular: Negative for chest pain, palpitations and leg swelling  Gastrointestinal: Negative for abdominal pain and blood in stool  Endocrine: Negative for cold intolerance, heat intolerance, polydipsia and polyuria  Genitourinary: Negative for difficulty urinating, dysuria, frequency and urgency  Musculoskeletal: Positive for arthralgias and myalgias  Skin: Negative for rash  Neurological: Negative for dizziness, weakness, light-headedness and headaches  Hematological: Does not bruise/bleed easily  Psychiatric/Behavioral: Negative for dysphoric mood and sleep disturbance           Objective:      Vitals:    07/19/18 1605   BP: 94/66   Pulse: 74          Physical Exam Constitutional: She is oriented to person, place, and time  She appears well-developed and well-nourished  HENT:   Head: Normocephalic and atraumatic  Nose: Nose normal    Mouth/Throat: Oropharynx is clear and moist    Eyes: Conjunctivae and EOM are normal  Pupils are equal, round, and reactive to light  No scleral icterus  Neck: Normal range of motion  Neck supple  No JVD present  No tracheal deviation present  No thyromegaly present  Cardiovascular: Normal rate, regular rhythm and intact distal pulses  Exam reveals no gallop and no friction rub  No murmur heard  Pulmonary/Chest: Effort normal and breath sounds normal  No respiratory distress  She has no wheezes  She has no rales  Musculoskeletal: She exhibits no edema or deformity  Lymphadenopathy:     She has no cervical adenopathy  Neurological: She is alert and oriented to person, place, and time  No cranial nerve deficit  Skin: Skin is warm and dry  No rash noted  No erythema  No pallor  Psychiatric: She has a normal mood and affect   Her behavior is normal  Judgment and thought content normal

## 2018-10-09 DIAGNOSIS — M79.7 FIBROMYALGIA: ICD-10-CM

## 2018-10-09 RX ORDER — MELOXICAM 15 MG/1
TABLET ORAL
Qty: 30 TABLET | Refills: 2 | Status: SHIPPED | OUTPATIENT
Start: 2018-10-09 | End: 2019-01-26 | Stop reason: SDUPTHER

## 2018-12-04 DIAGNOSIS — J45.30 MILD PERSISTENT ASTHMA, UNSPECIFIED WHETHER COMPLICATED: ICD-10-CM

## 2018-12-04 RX ORDER — MONTELUKAST SODIUM 10 MG/1
TABLET ORAL
Qty: 30 TABLET | Refills: 5 | Status: SHIPPED | OUTPATIENT
Start: 2018-12-04 | End: 2020-02-18 | Stop reason: SDUPTHER

## 2019-01-26 DIAGNOSIS — M79.7 FIBROMYALGIA: ICD-10-CM

## 2019-01-27 RX ORDER — MELOXICAM 15 MG/1
TABLET ORAL
Qty: 30 TABLET | Refills: 2 | Status: SHIPPED | OUTPATIENT
Start: 2019-01-27 | End: 2019-05-16 | Stop reason: SDUPTHER

## 2019-04-30 DIAGNOSIS — L30.9 ECZEMA, UNSPECIFIED TYPE: Primary | ICD-10-CM

## 2019-05-16 DIAGNOSIS — M79.7 FIBROMYALGIA: ICD-10-CM

## 2019-05-16 RX ORDER — MELOXICAM 15 MG/1
TABLET ORAL
Qty: 30 TABLET | Refills: 2 | Status: SHIPPED | OUTPATIENT
Start: 2019-05-16 | End: 2019-08-19 | Stop reason: SDUPTHER

## 2019-06-12 DIAGNOSIS — F41.9 ANXIETY: ICD-10-CM

## 2019-06-12 RX ORDER — VENLAFAXINE HYDROCHLORIDE 75 MG/1
CAPSULE, EXTENDED RELEASE ORAL
Qty: 30 CAPSULE | Refills: 5 | Status: SHIPPED | OUTPATIENT
Start: 2019-06-12 | End: 2019-08-06 | Stop reason: SDUPTHER

## 2019-07-12 ENCOUNTER — TELEPHONE (OUTPATIENT)
Dept: INTERNAL MEDICINE CLINIC | Facility: CLINIC | Age: 39
End: 2019-07-12

## 2019-07-12 NOTE — TELEPHONE ENCOUNTER
Patient has an appointment with [facility] on 8/6 and would like to have the scripts for her lab work reissued   The ones in the system were from 2018

## 2019-07-16 ENCOUNTER — TRANSCRIBE ORDERS (OUTPATIENT)
Dept: LAB | Facility: CLINIC | Age: 39
End: 2019-07-16

## 2019-07-16 ENCOUNTER — APPOINTMENT (OUTPATIENT)
Dept: LAB | Facility: CLINIC | Age: 39
End: 2019-07-16
Payer: COMMERCIAL

## 2019-07-16 DIAGNOSIS — M79.7 FIBROMYALGIA: ICD-10-CM

## 2019-07-16 DIAGNOSIS — Z00.00 WELL ADULT EXAM: ICD-10-CM

## 2019-07-16 DIAGNOSIS — F41.1 ANXIETY STATE: ICD-10-CM

## 2019-07-16 LAB
ALBUMIN SERPL BCP-MCNC: 4.2 G/DL (ref 3.5–5)
ALP SERPL-CCNC: 83 U/L (ref 46–116)
ALT SERPL W P-5'-P-CCNC: 21 U/L (ref 12–78)
ANION GAP SERPL CALCULATED.3IONS-SCNC: 9 MMOL/L (ref 4–13)
AST SERPL W P-5'-P-CCNC: 11 U/L (ref 5–45)
BILIRUB SERPL-MCNC: 0.28 MG/DL (ref 0.2–1)
BUN SERPL-MCNC: 16 MG/DL (ref 5–25)
CALCIUM SERPL-MCNC: 9.1 MG/DL (ref 8.3–10.1)
CHLORIDE SERPL-SCNC: 108 MMOL/L (ref 100–108)
CHOLEST SERPL-MCNC: 218 MG/DL (ref 50–200)
CO2 SERPL-SCNC: 24 MMOL/L (ref 21–32)
CREAT SERPL-MCNC: 0.83 MG/DL (ref 0.6–1.3)
ERYTHROCYTE [DISTWIDTH] IN BLOOD BY AUTOMATED COUNT: 13.2 % (ref 11.6–15.1)
GFR SERPL CREATININE-BSD FRML MDRD: 104 ML/MIN/1.73SQ M
GLUCOSE P FAST SERPL-MCNC: 88 MG/DL (ref 65–99)
HCT VFR BLD AUTO: 43.1 % (ref 34.8–46.1)
HDLC SERPL-MCNC: 57 MG/DL (ref 40–60)
HGB BLD-MCNC: 13.6 G/DL (ref 11.5–15.4)
LDLC SERPL CALC-MCNC: 147 MG/DL (ref 0–100)
MCH RBC QN AUTO: 27.3 PG (ref 26.8–34.3)
MCHC RBC AUTO-ENTMCNC: 31.6 G/DL (ref 31.4–37.4)
MCV RBC AUTO: 87 FL (ref 82–98)
NONHDLC SERPL-MCNC: 161 MG/DL
PLATELET # BLD AUTO: 325 THOUSANDS/UL (ref 149–390)
PMV BLD AUTO: 10.5 FL (ref 8.9–12.7)
POTASSIUM SERPL-SCNC: 3.9 MMOL/L (ref 3.5–5.3)
PROT SERPL-MCNC: 7.7 G/DL (ref 6.4–8.2)
RBC # BLD AUTO: 4.98 MILLION/UL (ref 3.81–5.12)
SODIUM SERPL-SCNC: 141 MMOL/L (ref 136–145)
TRIGL SERPL-MCNC: 70 MG/DL
TSH SERPL DL<=0.05 MIU/L-ACNC: 1.3 UIU/ML (ref 0.36–3.74)
WBC # BLD AUTO: 6.03 THOUSAND/UL (ref 4.31–10.16)

## 2019-07-16 PROCEDURE — 80061 LIPID PANEL: CPT

## 2019-07-16 PROCEDURE — 36415 COLL VENOUS BLD VENIPUNCTURE: CPT

## 2019-07-16 PROCEDURE — 84443 ASSAY THYROID STIM HORMONE: CPT

## 2019-07-16 PROCEDURE — 85027 COMPLETE CBC AUTOMATED: CPT

## 2019-07-16 PROCEDURE — 80053 COMPREHEN METABOLIC PANEL: CPT

## 2019-08-06 ENCOUNTER — OFFICE VISIT (OUTPATIENT)
Dept: INTERNAL MEDICINE CLINIC | Facility: CLINIC | Age: 39
End: 2019-08-06
Payer: COMMERCIAL

## 2019-08-06 VITALS
WEIGHT: 147.4 LBS | HEART RATE: 82 BPM | DIASTOLIC BLOOD PRESSURE: 64 MMHG | RESPIRATION RATE: 14 BRPM | BODY MASS INDEX: 25.16 KG/M2 | HEIGHT: 64 IN | SYSTOLIC BLOOD PRESSURE: 96 MMHG

## 2019-08-06 DIAGNOSIS — E78.5 HYPERLIPIDEMIA, UNSPECIFIED HYPERLIPIDEMIA TYPE: ICD-10-CM

## 2019-08-06 DIAGNOSIS — J30.1 SEASONAL ALLERGIC RHINITIS DUE TO POLLEN: Primary | ICD-10-CM

## 2019-08-06 DIAGNOSIS — F41.1 ANXIETY STATE: ICD-10-CM

## 2019-08-06 DIAGNOSIS — M79.671 RIGHT FOOT PAIN: ICD-10-CM

## 2019-08-06 DIAGNOSIS — M79.7 FIBROMYALGIA: ICD-10-CM

## 2019-08-06 DIAGNOSIS — J45.20 MILD INTERMITTENT EXTRINSIC ASTHMA WITHOUT COMPLICATION: ICD-10-CM

## 2019-08-06 DIAGNOSIS — F41.9 ANXIETY: ICD-10-CM

## 2019-08-06 DIAGNOSIS — R73.01 IMPAIRED FASTING BLOOD SUGAR: ICD-10-CM

## 2019-08-06 PROCEDURE — 3008F BODY MASS INDEX DOCD: CPT | Performed by: NURSE PRACTITIONER

## 2019-08-06 PROCEDURE — 1036F TOBACCO NON-USER: CPT | Performed by: NURSE PRACTITIONER

## 2019-08-06 PROCEDURE — 99214 OFFICE O/P EST MOD 30 MIN: CPT | Performed by: NURSE PRACTITIONER

## 2019-08-06 RX ORDER — VENLAFAXINE HYDROCHLORIDE 37.5 MG/1
37.5 CAPSULE, EXTENDED RELEASE ORAL DAILY
Qty: 30 CAPSULE | Refills: 2
Start: 2019-08-06 | End: 2019-09-06 | Stop reason: SDUPTHER

## 2019-08-06 NOTE — PATIENT INSTRUCTIONS
Fibromyalgia she is stable on 75 mg of Effexor she would like to consider weaning down but she still has a syrup +at home once she is ready we will switch down to 37-,1/2 she will stay on that until she is ready to wean further that I recommend 37-,1/2 every other day for 2 weeks then every 3rd day for 2 weeks then she can discontinue     allergies stable with p r n  Singulair     asthma stable with p r n  Inhaler     constipation recommend taking milk of magnesia to get the bowels moving    Then I recommend benefit fiber daily increasing fluids add probiotic if symptoms are not improving she should contact us she is complaining of a pelvic heaviness which may be related to constipation if not improving after her bowels are regulated consider a pelvic ultrasound     foot pain refer to Podiatry

## 2019-08-06 NOTE — PROGRESS NOTES
Assessment/Plan:    Patient Instructions    Fibromyalgia she is stable on 75 mg of Effexor she would like to consider weaning down but she still has a syrup +at home once she is ready we will switch down to 37-,1/2 she will stay on that until she is ready to wean further that I recommend 37-,1/2 every other day for 2 weeks then every 3rd day for 2 weeks then she can discontinue     allergies stable with p r n  Singulair     asthma stable with p r n  Inhaler     constipation recommend taking milk of magnesia to get the bowels moving  Then I recommend benefit fiber daily increasing fluids add probiotic if symptoms are not improving she should contact us she is complaining of a pelvic heaviness which may be related to constipation if not improving after her bowels are regulated consider a pelvic ultrasound     foot pain refer to Podiatry         Diagnoses and all orders for this visit:    Seasonal allergic rhinitis due to pollen    Mild intermittent extrinsic asthma without complication    Anxiety state  -     TSH, 3rd generation with Free T4 reflex; Future    Fibromyalgia    Right foot pain  -     Ambulatory referral to Podiatry; Future    Hyperlipidemia, unspecified hyperlipidemia type  -     CBC and differential; Future  -     Comprehensive metabolic panel; Future  -     Lipid panel; Future    Impaired fasting blood sugar  -     Hemoglobin A1C; Future    Anxiety  -     venlafaxine (EFFEXOR-XR) 37 5 mg 24 hr capsule; Take 1 capsule (37 5 mg total) by mouth daily         Subjective:      Patient ID: Vandana Muro is a 41 y o  female     Patient is here for general checkup   she has history of fibromyalgia trying to wean off medication she seems to be doing okay and 75 mg but would like to consider weaning down  She is also considering children in the future she would like to get off all medication   she has been suffering from constipation for over a year    She states that she will go days without a bowel movement then she will have a small hard pieces of stool sometimes then turning into motion then diarrhea  She has lower pelvic discomfort  She sometimes states that it affects her your ability to urinate  She was seen by Gynecology last year and everything appeared stable     she is a dancer by nature she states that she is having pain in the right foot on the big toe and sometimes numbness and tingling in between the 3rd and 4th toe        Current Outpatient Medications:     albuterol (PROAIR HFA) 90 mcg/act inhaler, Inhale, Disp: , Rfl:     Cholecalciferol (PA VITAMIN D-3) 2000 units CAPS, Take by mouth, Disp: , Rfl:     hydrocortisone 2 5 % cream, APPLY 2-3 TIMES DAILY TO AFFECTED AREA(S), PERIANALLY (Patient taking differently: as needed ), Disp: 28 35 g, Rfl: 0    montelukast (SINGULAIR) 10 mg tablet, TAKE 1 TABLET BY MOUTH EVERY DAY (Patient taking differently: as needed ), Disp: 30 tablet, Rfl: 5    venlafaxine (EFFEXOR-XR) 37 5 mg 24 hr capsule, Take 1 capsule (37 5 mg total) by mouth daily, Disp: 30 capsule, Rfl: 2    meloxicam (MOBIC) 15 mg tablet, TAKE 1 TABLET EVERY DAY AS NEEDED (Patient not taking: Reported on 8/6/2019), Disp: 30 tablet, Rfl: 2    Recent Results (from the past 1008 hour(s))   CBC    Collection Time: 07/16/19  1:33 PM   Result Value Ref Range    WBC 6 03 4 31 - 10 16 Thousand/uL    RBC 4 98 3 81 - 5 12 Million/uL    Hemoglobin 13 6 11 5 - 15 4 g/dL    Hematocrit 43 1 34 8 - 46 1 %    MCV 87 82 - 98 fL    MCH 27 3 26 8 - 34 3 pg    MCHC 31 6 31 4 - 37 4 g/dL    RDW 13 2 11 6 - 15 1 %    Platelets 227 331 - 099 Thousands/uL    MPV 10 5 8 9 - 12 7 fL   Comprehensive metabolic panel    Collection Time: 07/16/19  1:33 PM   Result Value Ref Range    Sodium 141 136 - 145 mmol/L    Potassium 3 9 3 5 - 5 3 mmol/L    Chloride 108 100 - 108 mmol/L    CO2 24 21 - 32 mmol/L    ANION GAP 9 4 - 13 mmol/L    BUN 16 5 - 25 mg/dL    Creatinine 0 83 0 60 - 1 30 mg/dL    Glucose, Fasting 88 65 - 99 mg/dL    Calcium 9 1 8 3 - 10 1 mg/dL    AST 11 5 - 45 U/L    ALT 21 12 - 78 U/L    Alkaline Phosphatase 83 46 - 116 U/L    Total Protein 7 7 6 4 - 8 2 g/dL    Albumin 4 2 3 5 - 5 0 g/dL    Total Bilirubin 0 28 0 20 - 1 00 mg/dL    eGFR 104 ml/min/1 73sq m   Lipid panel    Collection Time: 07/16/19  1:33 PM   Result Value Ref Range    Cholesterol 218 (H) 50 - 200 mg/dL    Triglycerides 70 <=150 mg/dL    HDL, Direct 57 40 - 60 mg/dL    LDL Calculated 147 (H) 0 - 100 mg/dL    Non-HDL-Chol (CHOL-HDL) 161 mg/dl   TSH, 3rd generation with Free T4 reflex    Collection Time: 07/16/19  1:33 PM   Result Value Ref Range    TSH 3RD GENERATON 1 300 0 358 - 3 740 uIU/mL       The following portions of the patient's history were reviewed and updated as appropriate: allergies, current medications, past family history, past medical history, past social history, past surgical history and problem list      Review of Systems   Constitutional: Negative for appetite change, chills, diaphoresis, fatigue, fever and unexpected weight change  HENT: Negative for postnasal drip and sneezing  Eyes: Negative for visual disturbance  Respiratory: Negative for chest tightness and shortness of breath  Cardiovascular: Negative for chest pain, palpitations and leg swelling  Gastrointestinal: Positive for constipation  Negative for abdominal pain and blood in stool  Endocrine: Negative for cold intolerance, heat intolerance, polydipsia, polyphagia and polyuria  Genitourinary: Negative for difficulty urinating, dysuria, frequency and urgency  Musculoskeletal: Negative for arthralgias and myalgias  Skin: Negative for rash and wound  Neurological: Negative for dizziness, weakness, light-headedness and headaches  Hematological: Negative for adenopathy  Psychiatric/Behavioral: Negative for confusion, dysphoric mood and sleep disturbance  The patient is not nervous/anxious            Objective:      BP 96/64 (BP Location: Left arm, Patient Position: Sitting, Cuff Size: Standard)   Pulse 82   Resp 14   Ht 5' 4" (1 626 m)   Wt 66 9 kg (147 lb 6 4 oz)   BMI 25 30 kg/m²        Physical Exam   Constitutional: She is oriented to person, place, and time  She appears well-developed  No distress  HENT:   Head: Normocephalic and atraumatic  Nose: Nose normal    Mouth/Throat: Oropharynx is clear and moist    Eyes: Pupils are equal, round, and reactive to light  Conjunctivae and EOM are normal    Neck: Normal range of motion  Neck supple  No JVD present  No tracheal deviation present  No thyromegaly present  Cardiovascular: Normal rate, regular rhythm, normal heart sounds and intact distal pulses  Exam reveals no gallop and no friction rub  No murmur heard  Pulmonary/Chest: Effort normal and breath sounds normal  No respiratory distress  She has no wheezes  She has no rales  Abdominal: Soft  Bowel sounds are normal  She exhibits no distension  There is no tenderness  Tenderness noted right lower quadrant   Musculoskeletal: Normal range of motion  She exhibits no edema  Lymphadenopathy:     She has no cervical adenopathy  Neurological: She is alert and oriented to person, place, and time  No cranial nerve deficit  Skin: Skin is warm and dry  No rash noted  She is not diaphoretic  Psychiatric: She has a normal mood and affect  Her behavior is normal  Judgment and thought content normal    BMI Counseling: Body mass index is 25 3 kg/m²  Discussed the patient's BMI with her  The BMI is above average  BMI counseling and education was provided to the patient  Nutrition recommendations include reducing portion sizes

## 2019-08-19 DIAGNOSIS — M79.7 FIBROMYALGIA: ICD-10-CM

## 2019-08-19 RX ORDER — MELOXICAM 15 MG/1
TABLET ORAL
Qty: 30 TABLET | Refills: 2 | Status: SHIPPED | OUTPATIENT
Start: 2019-08-19 | End: 2019-11-15

## 2019-09-06 DIAGNOSIS — F41.9 ANXIETY: ICD-10-CM

## 2019-09-07 RX ORDER — VENLAFAXINE HYDROCHLORIDE 37.5 MG/1
37.5 CAPSULE, EXTENDED RELEASE ORAL DAILY
Qty: 30 CAPSULE | Refills: 0 | Status: SHIPPED | OUTPATIENT
Start: 2019-09-07 | End: 2019-10-02 | Stop reason: SDUPTHER

## 2019-10-02 DIAGNOSIS — F41.9 ANXIETY: ICD-10-CM

## 2019-10-02 RX ORDER — VENLAFAXINE HYDROCHLORIDE 37.5 MG/1
CAPSULE, EXTENDED RELEASE ORAL
Qty: 30 CAPSULE | Refills: 0 | Status: SHIPPED | OUTPATIENT
Start: 2019-10-02 | End: 2019-11-15 | Stop reason: ALTCHOICE

## 2019-11-01 ENCOUNTER — TELEPHONE (OUTPATIENT)
Dept: INTERNAL MEDICINE CLINIC | Facility: CLINIC | Age: 39
End: 2019-11-01

## 2019-11-01 NOTE — TELEPHONE ENCOUNTER
LMTCB, just wondering the reason the patient is coming in on 11/15/2019  Just want to clarify what the reason for visit means

## 2019-11-05 NOTE — TELEPHONE ENCOUNTER
PT  CALLED   BACK   SHE  SAID  HER  VISIT  IS  JUST  ROUTINE  AND   A REVIEW  OF  HER  RXS   / I DON'T  KNOW  WHAT  CARINE  WAS  THINKING  WHEN  SHE  WROTE  THAT  SORRY

## 2019-11-15 ENCOUNTER — OFFICE VISIT (OUTPATIENT)
Dept: INTERNAL MEDICINE CLINIC | Facility: CLINIC | Age: 39
End: 2019-11-15
Payer: COMMERCIAL

## 2019-11-15 VITALS
HEIGHT: 64 IN | DIASTOLIC BLOOD PRESSURE: 70 MMHG | WEIGHT: 157 LBS | RESPIRATION RATE: 14 BRPM | SYSTOLIC BLOOD PRESSURE: 104 MMHG | HEART RATE: 80 BPM | BODY MASS INDEX: 26.8 KG/M2

## 2019-11-15 DIAGNOSIS — Z23 ENCOUNTER FOR IMMUNIZATION: ICD-10-CM

## 2019-11-15 DIAGNOSIS — R19.5 MUCUS IN STOOL: Primary | ICD-10-CM

## 2019-11-15 DIAGNOSIS — M79.7 FIBROMYALGIA: ICD-10-CM

## 2019-11-15 DIAGNOSIS — J45.20 MILD INTERMITTENT EXTRINSIC ASTHMA WITHOUT COMPLICATION: ICD-10-CM

## 2019-11-15 DIAGNOSIS — J30.1 SEASONAL ALLERGIC RHINITIS DUE TO POLLEN: ICD-10-CM

## 2019-11-15 DIAGNOSIS — M21.619 BUNION: ICD-10-CM

## 2019-11-15 PROCEDURE — 1036F TOBACCO NON-USER: CPT | Performed by: NURSE PRACTITIONER

## 2019-11-15 PROCEDURE — 90471 IMMUNIZATION ADMIN: CPT | Performed by: NURSE PRACTITIONER

## 2019-11-15 PROCEDURE — 99214 OFFICE O/P EST MOD 30 MIN: CPT | Performed by: NURSE PRACTITIONER

## 2019-11-15 PROCEDURE — 90686 IIV4 VACC NO PRSV 0.5 ML IM: CPT | Performed by: NURSE PRACTITIONER

## 2019-11-15 NOTE — PATIENT INSTRUCTIONS
Fibromyalgia, she has successfully weaned off Effexor  Still gets occasional headaches and vivid dreams but overall feels better sleep is improved we will continue to monitor symptoms     continues with constipation and mucus in stool despite being on a good bowel regimen of probiotics and then a fiber  Refer to GI for further recommendations     allergic rhinitis/ asthma stable on Singulair no recent flares ProAir as needed    Flu shot given today      Foot pain with history of bunion anticipating foot surgery

## 2019-11-15 NOTE — PROGRESS NOTES
Assessment/Plan:    Patient Instructions    Fibromyalgia, she has successfully weaned off Effexor  Still gets occasional headaches and vivid dreams but overall feels better sleep is improved we will continue to monitor symptoms     continues with constipation and mucus in stool despite being on a good bowel regimen of probiotics and then a fiber  Refer to GI for further recommendations     allergic rhinitis/ asthma stable on Singulair no recent flares ProAir as needed  Flu shot given today      Foot pain with history of bunion anticipating foot surgery         Diagnoses and all orders for this visit:    Mucus in stool  -     Ambulatory referral to Gastroenterology; Future    Encounter for immunization  -     influenza vaccine, [id number], quadrivalent, 0 5 mL, preservative-free, for adult and pediatric patients 6 mos+ (AFLURIA, FLUARIX, FLULAVAL, FLUZONE)    Bunion    Fibromyalgia    Seasonal allergic rhinitis due to pollen    Mild intermittent extrinsic asthma without complication    Other orders  -     diflorasone-emollient (APEXICON E) 0 05 % CREA; Apply topically as needed  -     Probiotic Product (PROBIOTIC ACIDOPHILUS BEADS PO); Take by mouth  -     Wheat Dextrin (BENEFIBER PO); Take by mouth         Subjective:      Patient ID: Carmen Mckay is a 46 y o  female     Patient is here to just follow up several issues  First she was able to exit successfully wean off Effexor  She has been off for about a little over a month now  She still feels a little edgy, has periodic headaches  Has weird dreams but feels like her sleep is better  She has good days and bad days as far as pain if she overdoes it she is quite sensitive all over  But she is trying to stay active  she did see a podiatrist diagnosed with bunions had orthotics, still has pain with walking questions if she should have surgery or not     asthma has been stable no recent flares     still with change in her bowel movements    She feels that the size of her stools are small  Sometimes there is no consistency to them  Sometimes there is still mucus  She is taking benefit fiber and probiotics        Current Outpatient Medications:     albuterol (PROAIR HFA) 90 mcg/act inhaler, Inhale, Disp: , Rfl:     Cholecalciferol (PA VITAMIN D-3) 2000 units CAPS, Take by mouth, Disp: , Rfl:     diflorasone-emollient (APEXICON E) 0 05 % CREA, Apply topically as needed, Disp: , Rfl:     hydrocortisone 2 5 % cream, APPLY 2-3 TIMES DAILY TO AFFECTED AREA(S), PERIANALLY (Patient taking differently: as needed ), Disp: 28 35 g, Rfl: 0    montelukast (SINGULAIR) 10 mg tablet, TAKE 1 TABLET BY MOUTH EVERY DAY (Patient taking differently: as needed ), Disp: 30 tablet, Rfl: 5    Probiotic Product (PROBIOTIC ACIDOPHILUS BEADS PO), Take by mouth, Disp: , Rfl:     Wheat Dextrin (BENEFIBER PO), Take by mouth, Disp: , Rfl:     No results found for this or any previous visit (from the past 1008 hour(s))  The following portions of the patient's history were reviewed and updated as appropriate: allergies, current medications, past family history, past medical history, past social history, past surgical history and problem list      Review of Systems   Constitutional: Negative for appetite change, chills, diaphoresis, fatigue, fever and unexpected weight change  HENT: Negative for postnasal drip and sneezing  Eyes: Negative for visual disturbance  Respiratory: Negative for chest tightness and shortness of breath  Cardiovascular: Negative for chest pain, palpitations and leg swelling  Gastrointestinal: Negative for abdominal pain and blood in stool  Endocrine: Negative for cold intolerance, heat intolerance, polydipsia, polyphagia and polyuria  Genitourinary: Negative for difficulty urinating, dysuria, frequency and urgency  Musculoskeletal: Negative for arthralgias and myalgias  Skin: Negative for rash and wound     Neurological: Negative for dizziness, weakness, light-headedness and headaches  Hematological: Negative for adenopathy  Psychiatric/Behavioral: Negative for confusion, dysphoric mood and sleep disturbance  The patient is not nervous/anxious  Objective:      /70 (BP Location: Left arm, Patient Position: Sitting, Cuff Size: Standard)   Pulse 80   Resp 14   Ht 5' 4" (1 626 m)   Wt 71 2 kg (157 lb)   BMI 26 95 kg/m²        Physical Exam   Constitutional: She is oriented to person, place, and time  She appears well-developed  No distress  HENT:   Head: Normocephalic and atraumatic  Nose: Nose normal    Mouth/Throat: Oropharynx is clear and moist    Eyes: Pupils are equal, round, and reactive to light  Conjunctivae and EOM are normal    Neck: Normal range of motion  Neck supple  No JVD present  No tracheal deviation present  No thyromegaly present  Cardiovascular: Normal rate, regular rhythm, normal heart sounds and intact distal pulses  Exam reveals no gallop and no friction rub  No murmur heard  Pulmonary/Chest: Effort normal and breath sounds normal  No respiratory distress  She has no wheezes  She has no rales  Abdominal: Soft  Bowel sounds are normal  She exhibits no distension  There is no tenderness  Musculoskeletal: Normal range of motion  She exhibits no edema  Lymphadenopathy:     She has no cervical adenopathy  Neurological: She is alert and oriented to person, place, and time  No cranial nerve deficit  Skin: Skin is warm and dry  No rash noted  She is not diaphoretic  Psychiatric: She has a normal mood and affect   Her behavior is normal  Judgment and thought content normal

## 2019-11-29 DIAGNOSIS — M79.7 FIBROMYALGIA: ICD-10-CM

## 2019-11-29 RX ORDER — MELOXICAM 15 MG/1
TABLET ORAL
Qty: 30 TABLET | Refills: 2 | Status: SHIPPED | OUTPATIENT
Start: 2019-11-29 | End: 2020-01-07 | Stop reason: ALTCHOICE

## 2019-12-12 ENCOUNTER — PREP FOR PROCEDURE (OUTPATIENT)
Dept: GASTROENTEROLOGY | Facility: CLINIC | Age: 39
End: 2019-12-12

## 2019-12-12 ENCOUNTER — OFFICE VISIT (OUTPATIENT)
Dept: GASTROENTEROLOGY | Facility: CLINIC | Age: 39
End: 2019-12-12
Payer: COMMERCIAL

## 2019-12-12 VITALS
SYSTOLIC BLOOD PRESSURE: 100 MMHG | BODY MASS INDEX: 26.26 KG/M2 | DIASTOLIC BLOOD PRESSURE: 60 MMHG | HEART RATE: 89 BPM | WEIGHT: 153 LBS

## 2019-12-12 DIAGNOSIS — R10.30 LOWER ABDOMINAL PAIN: Primary | ICD-10-CM

## 2019-12-12 DIAGNOSIS — R19.5 LOOSE STOOLS: ICD-10-CM

## 2019-12-12 DIAGNOSIS — R19.4 CHANGE IN BOWEL HABITS: ICD-10-CM

## 2019-12-12 DIAGNOSIS — R19.5 MUCUS IN STOOL: Primary | ICD-10-CM

## 2019-12-12 DIAGNOSIS — F41.9 ANXIETY: ICD-10-CM

## 2019-12-12 DIAGNOSIS — R19.5 MUCUS IN STOOL: ICD-10-CM

## 2019-12-12 PROCEDURE — 99244 OFF/OP CNSLTJ NEW/EST MOD 40: CPT | Performed by: INTERNAL MEDICINE

## 2019-12-12 RX ORDER — VENLAFAXINE HYDROCHLORIDE 75 MG/1
CAPSULE, EXTENDED RELEASE ORAL
Qty: 90 CAPSULE | Refills: 1 | Status: SHIPPED | OUTPATIENT
Start: 2019-12-12 | End: 2020-01-07 | Stop reason: ALTCHOICE

## 2019-12-12 NOTE — LETTER
December 12, 2019     Anjum Butler, [address] [place] [id number]    Patient: Dirk Grider   YOB: 1980   Date of Visit: 12/12/2019       Dear Dr Amy Reed: Thank you for referring Edilma Loyd to me for evaluation  Below are my notes for this consultation  If you have questions, please do not hesitate to call me  I look forward to following your patient along with you  Sincerely,        Karol Hess MD        CC: No Recipients  Karol Hess MD  12/12/2019  3:48 PM  Incomplete  [facility]    Dear Tamela Benton,     I had the pleasure of seeing your patient Dirk Grider in the office today and I thank you for this kind referral        Chief Complaint:  Mucus in her stools  HPI:  Dirk Grider is a 46 y o  female who presents with a change in bowel habits going back about a year  The patient describes mucus in the stool along with frequent loose stools  She is not having any watery stools  She gets a frequent lower abdominal pain which occasionally is very severe  Sometimes bowel movements help and sometimes not  The pain does not radiate  It does not last more than 10 or 15 minutes  She has not seen any blood in the stool  She has no melena or hematochezia  She has no fever or chills  There is no positional component  She has no nocturnal symptomatology  She has no exertional symptomatology  There is no relation to anything else  No other definitive exacerbating or remitting factors  She has no other GI history  No family history that she knows of any GI illnesses  No other complaints  CBC and complete metabolic profile normal   Again the patient has not seen any blood in the stool that she can recall  She has no shortness of breath, chest pain, dizziness, or any other symptomatology         Review of Systems:   Constitutional: No fever or chills, feels well, no tiredness, no recent weight gain or weight loss  HENT: No complaints of earache, no hearing loss, no nosebleeds, no nasal discharge, no sore throat, no hoarseness  Eyes: No complaints of eye pain, no red eyes, no discharge from eyes, no itchy eyes  Cardiovascular: No complaints of slow heart rate, no fast heart rate, no chest pain, no palpitations, no leg claudication, no lower extremity edema  Respiratory: No complaints of shortness of breath, no wheezing, no cough, no SOB on exertion, no orthopnea  Gastrointestinal: As noted in HPI  Genitourinary: No complaints of dysuria, no incontinence, no hesitancy, no nocturia  Musculoskeletal: No complaints of arthralgia, no myalgias, no joint swelling or stiffness, no limb pain or swelling  Neurological: No complaints of headache, no confusion, no convulsions, no numbness or tingling, no dizziness or fainting, no limb weakness, no difficulty walking  Skin: No complaints of skin rash or skin lesions, no itching, no skin wound, no dry skin  Hematological/Lymphatic: No complaints of swollen glands, does not bleed easy  Allergic/Immunologic: No immunocompromised state  Endocrine:  No complaints of polyuria, no polydipsia  Psychiatric/Behavioral: is not suicidal, no sleep disturbances, no anxiety or depression, no change in personality, no emotional problems         Historical Information   Past Medical History:   Diagnosis Date    Femoral hernia with obstruction     Irregular menses     Malaise and fatigue     Neurogenic pain     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[facility] [address] [facility]     Sleep apnea     Thymus disorder ([facility] [address] )     last assessed 4/17/14; resolved 7/2/15     Past Surgical History:   Procedure Laterality Date    NO PAST SURGERIES       Social History   Social History     Substance and Sexual Activity   Alcohol Use No     Social History     Substance and Sexual Activity   Drug Use No     Social History     Tobacco Use   Smoking Status Never Smoker   Smokeless Tobacco Never Used Family History   Problem Relation Age of Onset   [employer] Asthma Mother     Hyperlipidemia Mother     Hypertension Mother     Obesity Mother     Hyperlipidemia Father     Hypertension Father     Diabetes type II Father     Heart disease Father         valvular         Current Medications: has a current medication list which includes the following prescription(s): albuterol, cholecalciferol, diflorasone-emollient, hydrocortisone, montelukast, probiotic product, wheat dextrin, meloxicam, and venlafaxine  Vital Signs: /60   Pulse 89   Wt 69 4 kg (153 lb)   BMI 26 26 kg/m²      Physical Exam:   Constitutional  General Appearance: No acute distress, well appearing and well nourished  Head  Normocephalic  Eyes  Conjunctivae and lids: No swelling, erythema, or discharge  Pupils and irises: Equal, round and reactive to light  Ears, Nose, Mouth, and Throat  External inspection of ears and nose: Normal  Nasal mucosa, septum and turbinates: Normal without edema or erythema/   Oropharynx: Normal with no erythema, edema, exudate or lesions  Neck  Normal range of motion  Neck supple  Cardiovascular  Auscultation of the heart: Normal rate and rhythm, normal S1 and S2 without murmurs  Examination of the extremities for edema and/or varicosities: Normal  Pulmonary/Chest  Respiratory effort: No increased work of breathing or signs of respiratory distress  Auscultation of lungs: Clear to auscultation, equal breath sounds bilaterally, no wheezes, rales, no rhonchi  Abdomen  Abdomen:  Positive abdominal tenderness, mild, on palpation of the left lower quadrant  No rebound  , no masses  Liver and spleen: No hepatomegaly or splenomegaly  Musculoskeletal  Gait and station: normal   Digits and Nails: normal without clubbing or cyanosis  Inspection/palpation of joints, bones, and muscles: Normal  Neurological  No nystagmus or asterixis     Skin  Skin and subcutaneous tissue: Normal without rashes or lesions  Lymphatic  Palpation of the lymph nodes in neck: No lymphadenopathy  Psychiatric  Orientation to person, place and time: Normal   Mood and affect: Normal          Labs:   Lab Results   Component Value Date    ALT 21 07/16/2019    AST 11 07/16/2019    BUN 16 07/16/2019    CALCIUM 9 1 07/16/2019     07/16/2019    CHOL 185 12/09/2015    CO2 24 07/16/2019    CREATININE 0 83 07/16/2019    HDL 57 07/16/2019    HCT 43 1 07/16/2019    HGB 13 6 07/16/2019    MG 1 9 07/02/2018     07/16/2019    K 3 9 07/16/2019     12/09/2015    TRIG 70 07/16/2019    WBC 6 03 07/16/2019         X-Rays & Procedures:   No orders to display         ______________________________________________________________________      Assessment & Plan:      Diagnoses and all orders for this visit:    Lower abdominal pain    Mucus in stool  -     Ambulatory referral to Gastroenterology    Change in bowel habits    Loose stools        I have taken liberty of scheduling the patient for colonoscopy  I discussed the differential with her as well as a full explanation of risks and benefits  She is agreeable  I will be happy to inform you of her results and any further recommendations  I would like to thank you for me to participate in her care              With warmest regards,    Magaly Ahuja MD, [facility]

## 2019-12-12 NOTE — H&P (VIEW-ONLY)
[facility]    Dear Lucia Hernandez,     I had the pleasure of seeing your patient Rena Garcia in the office today and I thank you for this kind referral        Chief Complaint:  Mucus in her stools  HPI:  Rena Garcia is a 46 y o  female who presents with a change in bowel habits going back about a year  The patient describes mucus in the stool along with frequent loose stools  She is not having any watery stools  She gets a frequent lower abdominal pain which occasionally is very severe  Sometimes bowel movements help and sometimes not  The pain does not radiate  It does not last more than 10 or 15 minutes  She has not seen any blood in the stool  She has no melena or hematochezia  She has no fever or chills  There is no positional component  She has no nocturnal symptomatology  She has no exertional symptomatology  There is no relation to anything else  No other definitive exacerbating or remitting factors  She has no other GI history  No family history that she knows of any GI illnesses  No other complaints  CBC and complete metabolic profile normal   Again the patient has not seen any blood in the stool that she can recall  She has no shortness of breath, chest pain, dizziness, or any other symptomatology         Review of Systems:   Constitutional: No fever or chills, feels well, no tiredness, no recent weight gain or weight loss  HENT: No complaints of earache, no hearing loss, no nosebleeds, no nasal discharge, no sore throat, no hoarseness  Eyes: No complaints of eye pain, no red eyes, no discharge from eyes, no itchy eyes  Cardiovascular: No complaints of slow heart rate, no fast heart rate, no chest pain, no palpitations, no leg claudication, no lower extremity edema  Respiratory: No complaints of shortness of breath, no wheezing, no cough, no SOB on exertion, no orthopnea     Gastrointestinal: As noted in HPI  Genitourinary: No complaints of dysuria, no incontinence, no hesitancy, no nocturia  Musculoskeletal: No complaints of arthralgia, no myalgias, no joint swelling or stiffness, no limb pain or swelling  Neurological: No complaints of headache, no confusion, no convulsions, no numbness or tingling, no dizziness or fainting, no limb weakness, no difficulty walking  Skin: No complaints of skin rash or skin lesions, no itching, no skin wound, no dry skin  Hematological/Lymphatic: No complaints of swollen glands, does not bleed easy  Allergic/Immunologic: No immunocompromised state  Endocrine:  No complaints of polyuria, no polydipsia  Psychiatric/Behavioral: is not suicidal, no sleep disturbances, no anxiety or depression, no change in personality, no emotional problems  Historical Information   Past Medical History:   Diagnosis Date    Femoral hernia with obstruction     Irregular menses     Malaise and fatigue     Neurogenic pain     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HCC)     Sleep apnea     Thymus disorder ([facility] [address] )     last assessed 4/17/14; resolved 7/2/15     Past Surgical History:   Procedure Laterality Date    NO PAST SURGERIES       Social History   Social History     Substance and Sexual Activity   Alcohol Use No     Social History     Substance and Sexual Activity   Drug Use No     Social History     Tobacco Use   Smoking Status Never Smoker   Smokeless Tobacco Never Used     Family History   Problem Relation Age of Onset    Asthma Mother     Hyperlipidemia Mother     Hypertension Mother     Obesity Mother     Hyperlipidemia Father     Hypertension Father     Diabetes type II Father     Heart disease Father         valvular         Current Medications: has a current medication list which includes the following prescription(s): albuterol, cholecalciferol, diflorasone-emollient, hydrocortisone, montelukast, probiotic product, wheat dextrin, meloxicam, and venlafaxine         Vital Signs: /60   Pulse 89 Wt 69 4 kg (153 lb)   BMI 26 26 kg/m²     Physical Exam:   Constitutional  General Appearance: No acute distress, well appearing and well nourished  Head  Normocephalic  Eyes  Conjunctivae and lids: No swelling, erythema, or discharge  Pupils and irises: Equal, round and reactive to light  Ears, Nose, Mouth, and Throat  External inspection of ears and nose: Normal  Nasal mucosa, septum and turbinates: Normal without edema or erythema/   Oropharynx: Normal with no erythema, edema, exudate or lesions  Neck  Normal range of motion  Neck supple  Cardiovascular  Auscultation of the heart: Normal rate and rhythm, normal S1 and S2 without murmurs  Examination of the extremities for edema and/or varicosities: Normal  Pulmonary/Chest  Respiratory effort: No increased work of breathing or signs of respiratory distress  Auscultation of lungs: Clear to auscultation, equal breath sounds bilaterally, no wheezes, rales, no rhonchi  Abdomen  Abdomen:  Positive abdominal tenderness, mild, on palpation of the left lower quadrant  No rebound  , no masses  Liver and spleen: No hepatomegaly or splenomegaly  Musculoskeletal  Gait and station: normal   Digits and Nails: normal without clubbing or cyanosis  Inspection/palpation of joints, bones, and muscles: Normal  Neurological  No nystagmus or asterixis  Skin  Skin and subcutaneous tissue: Normal without rashes or lesions  Lymphatic  Palpation of the lymph nodes in neck: No lymphadenopathy     Psychiatric  Orientation to person, place and time: Normal   Mood and affect: Normal          Labs:   Lab Results   Component Value Date    ALT 21 07/16/2019    AST 11 07/16/2019    BUN 16 07/16/2019    CALCIUM 9 1 07/16/2019     07/16/2019    CHOL 185 12/09/2015    CO2 24 07/16/2019    CREATININE 0 83 07/16/2019    HDL 57 07/16/2019    HCT 43 1 07/16/2019    HGB 13 6 07/16/2019    MG 1 9 07/02/2018     07/16/2019    K 3 9 07/16/2019     12/09/2015    TRIG 70 07/16/2019    WBC 6 03 07/16/2019         X-Rays & Procedures:   No orders to display         ______________________________________________________________________      Assessment & Plan:      Diagnoses and all orders for this visit:    Lower abdominal pain    Mucus in stool  -     Ambulatory referral to Gastroenterology    Change in bowel habits    Loose stools        I have taken liberty of scheduling the patient for colonoscopy  I discussed the differential with her as well as a full explanation of risks and benefits  She is agreeable  I will be happy to inform you of her results and any further recommendations  I would like to thank you for me to participate in her care              With warmest regards,    Michael Henriquez MD, [facility]

## 2019-12-12 NOTE — PROGRESS NOTES
[facility]    Dear Mora Rosas,     I had the pleasure of seeing your patient Paola Enriquez in the office today and I thank you for this kind referral        Chief Complaint:  Mucus in her stools  HPI:  Paola Enriquez is a 46 y o  female who presents with a change in bowel habits going back about a year  The patient describes mucus in the stool along with frequent loose stools  She is not having any watery stools  She gets a frequent lower abdominal pain which occasionally is very severe  Sometimes bowel movements help and sometimes not  The pain does not radiate  It does not last more than 10 or 15 minutes  She has not seen any blood in the stool  She has no melena or hematochezia  She has no fever or chills  There is no positional component  She has no nocturnal symptomatology  She has no exertional symptomatology  There is no relation to anything else  No other definitive exacerbating or remitting factors  She has no other GI history  No family history that she knows of any GI illnesses  No other complaints  CBC and complete metabolic profile normal   Again the patient has not seen any blood in the stool that she can recall  She has no shortness of breath, chest pain, dizziness, or any other symptomatology         Review of Systems:   Constitutional: No fever or chills, feels well, no tiredness, no recent weight gain or weight loss  HENT: No complaints of earache, no hearing loss, no nosebleeds, no nasal discharge, no sore throat, no hoarseness  Eyes: No complaints of eye pain, no red eyes, no discharge from eyes, no itchy eyes  Cardiovascular: No complaints of slow heart rate, no fast heart rate, no chest pain, no palpitations, no leg claudication, no lower extremity edema  Respiratory: No complaints of shortness of breath, no wheezing, no cough, no SOB on exertion, no orthopnea     Gastrointestinal: As noted in HPI  Genitourinary: No complaints of dysuria, no incontinence, no hesitancy, no nocturia  Musculoskeletal: No complaints of arthralgia, no myalgias, no joint swelling or stiffness, no limb pain or swelling  Neurological: No complaints of headache, no confusion, no convulsions, no numbness or tingling, no dizziness or fainting, no limb weakness, no difficulty walking  Skin: No complaints of skin rash or skin lesions, no itching, no skin wound, no dry skin  Hematological/Lymphatic: No complaints of swollen glands, does not bleed easy  Allergic/Immunologic: No immunocompromised state  Endocrine:  No complaints of polyuria, no polydipsia  Psychiatric/Behavioral: is not suicidal, no sleep disturbances, no anxiety or depression, no change in personality, no emotional problems  Historical Information   Past Medical History:   Diagnosis Date    Femoral hernia with obstruction     Irregular menses     Malaise and fatigue     Neurogenic pain     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HCC)     Sleep apnea     Thymus disorder ([facility] [address] )     last assessed 4/17/14; resolved 7/2/15     Past Surgical History:   Procedure Laterality Date    NO PAST SURGERIES       Social History   Social History     Substance and Sexual Activity   Alcohol Use No     Social History     Substance and Sexual Activity   Drug Use No     Social History     Tobacco Use   Smoking Status Never Smoker   Smokeless Tobacco Never Used     Family History   Problem Relation Age of Onset    Asthma Mother     Hyperlipidemia Mother     Hypertension Mother     Obesity Mother     Hyperlipidemia Father     Hypertension Father     Diabetes type II Father     Heart disease Father         valvular         Current Medications: has a current medication list which includes the following prescription(s): albuterol, cholecalciferol, diflorasone-emollient, hydrocortisone, montelukast, probiotic product, wheat dextrin, meloxicam, and venlafaxine         Vital Signs: /60   Pulse 89 Wt 69 4 kg (153 lb)   BMI 26 26 kg/m²     Physical Exam:   Constitutional  General Appearance: No acute distress, well appearing and well nourished  Head  Normocephalic  Eyes  Conjunctivae and lids: No swelling, erythema, or discharge  Pupils and irises: Equal, round and reactive to light  Ears, Nose, Mouth, and Throat  External inspection of ears and nose: Normal  Nasal mucosa, septum and turbinates: Normal without edema or erythema/   Oropharynx: Normal with no erythema, edema, exudate or lesions  Neck  Normal range of motion  Neck supple  Cardiovascular  Auscultation of the heart: Normal rate and rhythm, normal S1 and S2 without murmurs  Examination of the extremities for edema and/or varicosities: Normal  Pulmonary/Chest  Respiratory effort: No increased work of breathing or signs of respiratory distress  Auscultation of lungs: Clear to auscultation, equal breath sounds bilaterally, no wheezes, rales, no rhonchi  Abdomen  Abdomen:  Positive abdominal tenderness, mild, on palpation of the left lower quadrant  No rebound  , no masses  Liver and spleen: No hepatomegaly or splenomegaly  Musculoskeletal  Gait and station: normal   Digits and Nails: normal without clubbing or cyanosis  Inspection/palpation of joints, bones, and muscles: Normal  Neurological  No nystagmus or asterixis  Skin  Skin and subcutaneous tissue: Normal without rashes or lesions  Lymphatic  Palpation of the lymph nodes in neck: No lymphadenopathy     Psychiatric  Orientation to person, place and time: Normal   Mood and affect: Normal          Labs:   Lab Results   Component Value Date    ALT 21 07/16/2019    AST 11 07/16/2019    BUN 16 07/16/2019    CALCIUM 9 1 07/16/2019     07/16/2019    CHOL 185 12/09/2015    CO2 24 07/16/2019    CREATININE 0 83 07/16/2019    HDL 57 07/16/2019    HCT 43 1 07/16/2019    HGB 13 6 07/16/2019    MG 1 9 07/02/2018     07/16/2019    K 3 9 07/16/2019     12/09/2015    TRIG 70 07/16/2019    WBC 6 03 07/16/2019         X-Rays & Procedures:   No orders to display         ______________________________________________________________________      Assessment & Plan:      Diagnoses and all orders for this visit:    Lower abdominal pain    Mucus in stool  -     Ambulatory referral to Gastroenterology    Change in bowel habits    Loose stools        I have taken liberty of scheduling the patient for colonoscopy  I discussed the differential with her as well as a full explanation of risks and benefits  She is agreeable  I will be happy to inform you of her results and any further recommendations  I would like to thank you for me to participate in her care              With warmest regards,    Haroldo Calloway MD, [facility]

## 2019-12-31 ENCOUNTER — TELEPHONE (OUTPATIENT)
Dept: GASTROENTEROLOGY | Facility: CLINIC | Age: 39
End: 2019-12-31

## 2020-01-07 ENCOUNTER — ANESTHESIA (OUTPATIENT)
Dept: GASTROENTEROLOGY | Facility: HOSPITAL | Age: 40
End: 2020-01-07

## 2020-01-07 ENCOUNTER — ANESTHESIA EVENT (OUTPATIENT)
Dept: GASTROENTEROLOGY | Facility: HOSPITAL | Age: 40
End: 2020-01-07

## 2020-01-07 ENCOUNTER — HOSPITAL ENCOUNTER (OUTPATIENT)
Dept: GASTROENTEROLOGY | Facility: HOSPITAL | Age: 40
Setting detail: OUTPATIENT SURGERY
Discharge: HOME | End: 2020-01-07
Attending: INTERNAL MEDICINE | Admitting: INTERNAL MEDICINE
Payer: COMMERCIAL

## 2020-01-07 VITALS
SYSTOLIC BLOOD PRESSURE: 90 MMHG | BODY MASS INDEX: 26.57 KG/M2 | RESPIRATION RATE: 18 BRPM | TEMPERATURE: 98 F | DIASTOLIC BLOOD PRESSURE: 54 MMHG | HEIGHT: 64 IN | WEIGHT: 155.65 LBS | OXYGEN SATURATION: 100 % | HEART RATE: 65 BPM

## 2020-01-07 DIAGNOSIS — R19.5 MUCUS IN STOOL: ICD-10-CM

## 2020-01-07 LAB
EXT PREGNANCY TEST URINE: NEGATIVE
EXT. CONTROL: NORMAL

## 2020-01-07 PROCEDURE — 81025 URINE PREGNANCY TEST: CPT | Performed by: ANESTHESIOLOGY

## 2020-01-07 PROCEDURE — 88305 TISSUE EXAM BY PATHOLOGIST: CPT | Performed by: PATHOLOGY

## 2020-01-07 PROCEDURE — 45380 COLONOSCOPY AND BIOPSY: CPT | Performed by: INTERNAL MEDICINE

## 2020-01-07 RX ORDER — LIDOCAINE HYDROCHLORIDE 10 MG/ML
INJECTION, SOLUTION EPIDURAL; INFILTRATION; INTRACAUDAL; PERINEURAL AS NEEDED
Status: DISCONTINUED | OUTPATIENT
Start: 2020-01-07 | End: 2020-01-07 | Stop reason: SURG

## 2020-01-07 RX ORDER — PROPOFOL 10 MG/ML
INJECTION, EMULSION INTRAVENOUS AS NEEDED
Status: DISCONTINUED | OUTPATIENT
Start: 2020-01-07 | End: 2020-01-07 | Stop reason: SURG

## 2020-01-07 RX ORDER — SODIUM CHLORIDE, SODIUM LACTATE, POTASSIUM CHLORIDE, CALCIUM CHLORIDE 600; 310; 30; 20 MG/100ML; MG/100ML; MG/100ML; MG/100ML
100 INJECTION, SOLUTION INTRAVENOUS CONTINUOUS
Status: DISCONTINUED | OUTPATIENT
Start: 2020-01-07 | End: 2020-01-11 | Stop reason: HOSPADM

## 2020-01-07 RX ADMIN — PROPOFOL 50 MG: 10 INJECTION, EMULSION INTRAVENOUS at 08:13

## 2020-01-07 RX ADMIN — PROPOFOL 100 MG: 10 INJECTION, EMULSION INTRAVENOUS at 08:08

## 2020-01-07 RX ADMIN — SODIUM CHLORIDE, SODIUM LACTATE, POTASSIUM CHLORIDE, AND CALCIUM CHLORIDE: .6; .31; .03; .02 INJECTION, SOLUTION INTRAVENOUS at 07:40

## 2020-01-07 RX ADMIN — LIDOCAINE HYDROCHLORIDE 50 MG: 10 INJECTION, SOLUTION EPIDURAL; INFILTRATION; INTRACAUDAL; PERINEURAL at 08:08

## 2020-01-07 RX ADMIN — PROPOFOL 50 MG: 10 INJECTION, EMULSION INTRAVENOUS at 08:10

## 2020-01-07 NOTE — DISCHARGE INSTRUCTIONS
Colonoscopy   WHAT YOU NEED TO KNOW:   A colonoscopy is a procedure to examine the inside of your colon (intestine) with a scope  Polyps or tissue growths may have been removed during your colonoscopy  It is normal to feel bloated and to have some abdominal discomfort  You should be passing gas  If you have hemorrhoids or you had polyps removed, you may have a small amount of bleeding  DISCHARGE INSTRUCTIONS:   Seek care immediately if:   · You have a large amount of bright red blood in your bowel movements  · Your abdomen is hard and firm and you have severe pain  · You have sudden trouble breathing  Contact your healthcare provider if:   · You develop a rash or hives  · You have a fever within 24 hours of your procedure  · You have not had a bowel movement for 3 days after your procedure  · You have questions or concerns about your condition or care  Activity:   · Do not lift, strain, or run  for 3 days after your procedure  · Rest after your procedure  You have been given medicine to relax you  Do not  drive or make important decisions until the day after your procedure  Return to your normal activity as directed  · Relieve gas and discomfort from bloating  by lying on your right side with a heating pad on your abdomen  You may need to take short walks to help the gas move out  Eat small meals until bloating is relieved  If you had polyps removed: For 7 days after your procedure:  · Do not  take aspirin  · Do not  go on long car rides  Help prevent constipation:   · Eat a variety of healthy foods  Healthy foods include fruit, vegetables, whole-grain breads, low-fat dairy products, beans, lean meat, and fish  Ask if you need to be on a special diet  Your healthcare provider may recommend that you eat high-fiber foods such as cooked beans  Fiber helps you have regular bowel movements  · Drink liquids as directed    Adults should drink between 9 and 13 eight-ounce cups of liquid every day  Ask what amount is best for you  For most people, good liquids to drink are water, juice, and milk  · Exercise as directed  Talk to your healthcare provider about the best exercise plan for you  Exercise can help prevent constipation, decrease your blood pressure and improve your health  Follow up with your healthcare provider as directed:  Write down your questions so you remember to ask them during your visits  © 2017 [employer] Information is for End User's use only and may not be sold, redistributed or otherwise used for commercial purposes  All illustrations and images included in CareNotes® are the copyrighted property of [employer]  or [employer]  The above information is an  only  It is not intended as medical advice for individual conditions or treatments  Talk to your doctor, nurse or pharmacist before following any medical regimen to see if it is safe and effective for you

## 2020-01-07 NOTE — ANESTHESIA PREPROCEDURE EVALUATION
Review of Systems/Medical History  Patient summary reviewed  Chart reviewed  No history of anesthetic complications     Cardiovascular  Negative cardio ROS Exercise tolerance (METS): >4,     Pulmonary  Asthma , well controlled/ stable Asthma type of rescue: PRN inhaler,        GI/Hepatic    Bowel prep       Negative  ROS        Endo/Other  Negative endo/other ROS      GYN  Negative gynecology ROS          Hematology  Negative hematology ROS      Musculoskeletal  Negative musculoskeletal ROS        Neurology  Negative neurology ROS      Psychology   Anxiety,     Comment: fibromyalgia         Physical Exam    Airway    Mallampati score: II  TM Distance: >3 FB  Neck ROM: full     Dental       Cardiovascular  Comment: Negative ROS,     Pulmonary      Other Findings        Anesthesia Plan  ASA Score- 2     Anesthesia Type- IV sedation with anesthesia with ASA Monitors  Additional Monitors:   Airway Plan:     Comment: Recent labs personally reviewed:  Lab Results       Component                Value               Date                       WBC                      6 03                07/16/2019                 HGB                      13 6                07/16/2019                 PLT                      325                 07/16/2019            Lab Results       Component                Value               Date                       NA                       141                 12/09/2015                 K                        3 9                 07/16/2019                 BUN                      16                  07/16/2019                 CREATININE               0 83                07/16/2019                 GLUCOSE                  76                  12/09/2015            No results found for: PTT   No results found for: INR    Blood type       I, Marcelo Nelson MD, have personally seen and evaluated the patient prior to anesthetic care    I have reviewed the pre-anesthetic record, medical history, allergies, medications and any other medical records if appropriate to the anesthetic care  If a CRNA is involved in the case, I have reviewed the CRNA assessment, if present, and agree  I discussed the anesthetic plan and risks/benefits/alternatives with the patient including possible PONV, sore throat, and possibility of rare anesthetic and surgical emergencies        Plan Factors-  Patient did not smoke on day of surgery  Induction- intravenous  Postoperative Plan-     Informed Consent- Anesthetic plan and risks discussed with patient  I personally reviewed this patient with the CRNA  Discussed and agreed on the Anesthesia Plan with the CRNA  Ramya Gomez

## 2020-01-07 NOTE — ANESTHESIA POSTPROCEDURE EVALUATION
Post-Op Assessment Note    CV Status:  Stable  Pain Score: 0    Pain management: adequate     Mental Status:  Somnolent   Hydration Status:  Euvolemic   PONV Controlled:  Controlled   Airway Patency:  Patent   Post Op Vitals Reviewed: Yes      Staff: PRIMO           BP (!) 89/53 (01/07/20 0818)    Temp 98 °F (36 7 °C) (01/07/20 0818)    Pulse 96 (01/07/20 0818)   Resp 20 (01/07/20 0818)    SpO2 98 % (01/07/20 0818)

## 2020-01-07 NOTE — INTERVAL H&P NOTE
H&P reviewed  After examining the patient I find no changes in the patients condition since the H&P had been written      Vitals:    01/07/20 0733   BP: 100/65   Pulse: 55   Resp: 16   Temp: 98 7 °F (37 1 °C)   SpO2: 97%

## 2020-01-21 ENCOUNTER — TELEPHONE (OUTPATIENT)
Dept: GASTROENTEROLOGY | Facility: CLINIC | Age: 40
End: 2020-01-21

## 2020-01-21 NOTE — TELEPHONE ENCOUNTER
Results given  Still has occasional days where she has 3 loose bowel movements  Told to try a lactose-free diet and call me in 2 weeks with her progress

## 2020-02-18 ENCOUNTER — TELEPHONE (OUTPATIENT)
Dept: GASTROENTEROLOGY | Facility: CLINIC | Age: 40
End: 2020-02-18

## 2020-02-18 DIAGNOSIS — J45.30 MILD PERSISTENT ASTHMA, UNSPECIFIED WHETHER COMPLICATED: ICD-10-CM

## 2020-02-18 RX ORDER — MONTELUKAST SODIUM 10 MG/1
10 TABLET ORAL DAILY
Qty: 90 TABLET | Refills: 1 | Status: SHIPPED | OUTPATIENT
Start: 2020-02-18 | End: 2020-06-11

## 2020-03-11 ENCOUNTER — APPOINTMENT (OUTPATIENT)
Dept: LAB | Facility: CLINIC | Age: 40
End: 2020-03-11
Payer: COMMERCIAL

## 2020-03-11 ENCOUNTER — OFFICE VISIT (OUTPATIENT)
Dept: INTERNAL MEDICINE CLINIC | Facility: CLINIC | Age: 40
End: 2020-03-11
Payer: COMMERCIAL

## 2020-03-11 VITALS
BODY MASS INDEX: 26.3 KG/M2 | SYSTOLIC BLOOD PRESSURE: 106 MMHG | RESPIRATION RATE: 12 BRPM | DIASTOLIC BLOOD PRESSURE: 80 MMHG | HEART RATE: 80 BPM | WEIGHT: 153.2 LBS

## 2020-03-11 DIAGNOSIS — F41.1 ANXIETY STATE: ICD-10-CM

## 2020-03-11 DIAGNOSIS — J30.1 SEASONAL ALLERGIC RHINITIS DUE TO POLLEN: ICD-10-CM

## 2020-03-11 DIAGNOSIS — K58.0 IRRITABLE BOWEL SYNDROME WITH DIARRHEA: ICD-10-CM

## 2020-03-11 DIAGNOSIS — J45.20 MILD INTERMITTENT EXTRINSIC ASTHMA WITHOUT COMPLICATION: ICD-10-CM

## 2020-03-11 DIAGNOSIS — M79.7 FIBROMYALGIA: Primary | ICD-10-CM

## 2020-03-11 PROCEDURE — 99214 OFFICE O/P EST MOD 30 MIN: CPT | Performed by: INTERNAL MEDICINE

## 2020-03-11 PROCEDURE — 82784 ASSAY IGA/IGD/IGG/IGM EACH: CPT

## 2020-03-11 PROCEDURE — 86255 FLUORESCENT ANTIBODY SCREEN: CPT

## 2020-03-11 PROCEDURE — 1036F TOBACCO NON-USER: CPT | Performed by: INTERNAL MEDICINE

## 2020-03-11 PROCEDURE — 36415 COLL VENOUS BLD VENIPUNCTURE: CPT

## 2020-03-11 PROCEDURE — 83516 IMMUNOASSAY NONANTIBODY: CPT

## 2020-03-11 RX ORDER — FLUTICASONE PROPIONATE 50 MCG
1 SPRAY, SUSPENSION (ML) NASAL 2 TIMES DAILY WITH MEALS
Qty: 16 G | Refills: 0 | Status: SHIPPED | OUTPATIENT
Start: 2020-03-11 | End: 2020-04-04

## 2020-03-11 NOTE — PATIENT INSTRUCTIONS
Labs and colonoscopy reviewed    Irritable bowel syndrome-rule out celiac sprue, if celiac antibodies negative we discussed trying an elimination diet verses FODMAP diet  Continue with fiber supplement as that has been beneficial     Anxiety-patient successfully weaned off of venlafaxine but has persistent hypersensitivity to smells, light and sound  I recommended she establish with a therapist   Additionally can consider discontinuation of Singulair to see if that has any bearing based upon recent FDA warnings regarding psychiatric disturbance with Singulair use  Consider Xyzal, Claritin, Allegra or Zyrtec instead  Fibromyalgia-continue with sleep hygiene, regular exercise and stretching  Avoid further medication based upon wanting to eventually conceive

## 2020-03-13 ENCOUNTER — TELEPHONE (OUTPATIENT)
Dept: INTERNAL MEDICINE CLINIC | Facility: CLINIC | Age: 40
End: 2020-03-13

## 2020-03-13 LAB
ENDOMYSIUM IGA SER QL: NEGATIVE
GLIADIN PEPTIDE IGA SER-ACNC: 4 UNITS (ref 0–19)
GLIADIN PEPTIDE IGG SER-ACNC: 4 UNITS (ref 0–19)
IGA SERPL-MCNC: 106 MG/DL (ref 87–352)
TTG IGA SER-ACNC: <2 U/ML (ref 0–3)
TTG IGG SER-ACNC: 2 U/ML (ref 0–5)

## 2020-04-04 DIAGNOSIS — J30.1 SEASONAL ALLERGIC RHINITIS DUE TO POLLEN: ICD-10-CM

## 2020-04-04 RX ORDER — FLUTICASONE PROPIONATE 50 MCG
1 SPRAY, SUSPENSION (ML) NASAL 2 TIMES DAILY WITH MEALS
Qty: 16 ML | Refills: 0 | Status: SHIPPED | OUTPATIENT
Start: 2020-04-04 | End: 2020-07-13 | Stop reason: SDUPTHER

## 2020-04-16 DIAGNOSIS — M79.7 FIBROMYALGIA: ICD-10-CM

## 2020-04-16 RX ORDER — MELOXICAM 15 MG/1
TABLET ORAL
Qty: 30 TABLET | Refills: 2 | Status: SHIPPED | OUTPATIENT
Start: 2020-04-16 | End: 2020-06-11

## 2020-06-10 ENCOUNTER — TELEPHONE (OUTPATIENT)
Dept: INTERNAL MEDICINE CLINIC | Facility: CLINIC | Age: 40
End: 2020-06-10

## 2020-06-11 ENCOUNTER — OFFICE VISIT (OUTPATIENT)
Dept: INTERNAL MEDICINE CLINIC | Facility: CLINIC | Age: 40
End: 2020-06-11
Payer: COMMERCIAL

## 2020-06-11 VITALS
RESPIRATION RATE: 12 BRPM | HEIGHT: 64 IN | SYSTOLIC BLOOD PRESSURE: 106 MMHG | WEIGHT: 151.8 LBS | BODY MASS INDEX: 25.92 KG/M2 | DIASTOLIC BLOOD PRESSURE: 72 MMHG | HEART RATE: 64 BPM | TEMPERATURE: 99.2 F

## 2020-06-11 DIAGNOSIS — J30.1 SEASONAL ALLERGIC RHINITIS DUE TO POLLEN: Primary | ICD-10-CM

## 2020-06-11 DIAGNOSIS — J45.20 MILD INTERMITTENT EXTRINSIC ASTHMA WITHOUT COMPLICATION: ICD-10-CM

## 2020-06-11 DIAGNOSIS — M79.7 FIBROMYALGIA: ICD-10-CM

## 2020-06-11 DIAGNOSIS — F41.1 ANXIETY STATE: ICD-10-CM

## 2020-06-11 PROCEDURE — 99214 OFFICE O/P EST MOD 30 MIN: CPT | Performed by: INTERNAL MEDICINE

## 2020-06-11 PROCEDURE — 1036F TOBACCO NON-USER: CPT | Performed by: INTERNAL MEDICINE

## 2020-06-11 PROCEDURE — 3008F BODY MASS INDEX DOCD: CPT | Performed by: INTERNAL MEDICINE

## 2020-06-11 RX ORDER — LEVOCETIRIZINE DIHYDROCHLORIDE 5 MG/1
5 TABLET, FILM COATED ORAL EVERY EVENING
COMMUNITY

## 2020-07-13 ENCOUNTER — TELEPHONE (OUTPATIENT)
Dept: INTERNAL MEDICINE CLINIC | Facility: CLINIC | Age: 40
End: 2020-07-13

## 2020-07-13 DIAGNOSIS — J30.1 SEASONAL ALLERGIC RHINITIS DUE TO POLLEN: ICD-10-CM

## 2020-07-13 RX ORDER — FLUTICASONE PROPIONATE 50 MCG
1 SPRAY, SUSPENSION (ML) NASAL 2 TIMES DAILY WITH MEALS
Qty: 16 ML | Refills: 0 | Status: SHIPPED | OUTPATIENT
Start: 2020-07-13 | End: 2020-11-06

## 2020-09-14 ENCOUNTER — APPOINTMENT (OUTPATIENT)
Dept: LAB | Facility: CLINIC | Age: 40
End: 2020-09-14
Payer: COMMERCIAL

## 2020-09-14 DIAGNOSIS — M79.7 FIBROMYALGIA: ICD-10-CM

## 2020-09-14 DIAGNOSIS — R73.01 IMPAIRED FASTING GLUCOSE: ICD-10-CM

## 2020-09-14 DIAGNOSIS — E78.5 HYPERLIPIDEMIA, UNSPECIFIED HYPERLIPIDEMIA TYPE: ICD-10-CM

## 2020-09-14 DIAGNOSIS — F41.1 ANXIETY STATE: ICD-10-CM

## 2020-09-14 DIAGNOSIS — F41.1 ANXIETY STATE: Primary | ICD-10-CM

## 2020-09-14 LAB
ALBUMIN SERPL BCP-MCNC: 4 G/DL (ref 3.5–5)
ALP SERPL-CCNC: 70 U/L (ref 46–116)
ALT SERPL W P-5'-P-CCNC: 18 U/L (ref 12–78)
ANION GAP SERPL CALCULATED.3IONS-SCNC: 8 MMOL/L (ref 4–13)
AST SERPL W P-5'-P-CCNC: 11 U/L (ref 5–45)
BASOPHILS # BLD AUTO: 0.04 THOUSANDS/ΜL (ref 0–0.1)
BASOPHILS NFR BLD AUTO: 1 % (ref 0–1)
BILIRUB SERPL-MCNC: 0.52 MG/DL (ref 0.2–1)
BUN SERPL-MCNC: 13 MG/DL (ref 5–25)
CALCIUM SERPL-MCNC: 9.1 MG/DL (ref 8.3–10.1)
CHLORIDE SERPL-SCNC: 110 MMOL/L (ref 100–108)
CHOLEST SERPL-MCNC: 193 MG/DL (ref 50–200)
CO2 SERPL-SCNC: 22 MMOL/L (ref 21–32)
CREAT SERPL-MCNC: 0.8 MG/DL (ref 0.6–1.3)
EOSINOPHIL # BLD AUTO: 0.04 THOUSAND/ΜL (ref 0–0.61)
EOSINOPHIL NFR BLD AUTO: 1 % (ref 0–6)
ERYTHROCYTE [DISTWIDTH] IN BLOOD BY AUTOMATED COUNT: 13.5 % (ref 11.6–15.1)
GFR SERPL CREATININE-BSD FRML MDRD: 108 ML/MIN/1.73SQ M
GLUCOSE SERPL-MCNC: 80 MG/DL (ref 65–140)
HCT VFR BLD AUTO: 42 % (ref 34.8–46.1)
HDLC SERPL-MCNC: 45 MG/DL
HGB BLD-MCNC: 13.5 G/DL (ref 11.5–15.4)
IMM GRANULOCYTES # BLD AUTO: 0 THOUSAND/UL (ref 0–0.2)
IMM GRANULOCYTES NFR BLD AUTO: 0 % (ref 0–2)
LDLC SERPL CALC-MCNC: 127 MG/DL (ref 0–100)
LYMPHOCYTES # BLD AUTO: 1.89 THOUSANDS/ΜL (ref 0.6–4.47)
LYMPHOCYTES NFR BLD AUTO: 43 % (ref 14–44)
MCH RBC QN AUTO: 28.1 PG (ref 26.8–34.3)
MCHC RBC AUTO-ENTMCNC: 32.1 G/DL (ref 31.4–37.4)
MCV RBC AUTO: 88 FL (ref 82–98)
MONOCYTES # BLD AUTO: 0.31 THOUSAND/ΜL (ref 0.17–1.22)
MONOCYTES NFR BLD AUTO: 7 % (ref 4–12)
NEUTROPHILS # BLD AUTO: 2.11 THOUSANDS/ΜL (ref 1.85–7.62)
NEUTS SEG NFR BLD AUTO: 48 % (ref 43–75)
NRBC BLD AUTO-RTO: 0 /100 WBCS
PLATELET # BLD AUTO: 282 THOUSANDS/UL (ref 149–390)
PMV BLD AUTO: 10.5 FL (ref 8.9–12.7)
POTASSIUM SERPL-SCNC: 3.7 MMOL/L (ref 3.5–5.3)
PROT SERPL-MCNC: 7.7 G/DL (ref 6.4–8.2)
RBC # BLD AUTO: 4.8 MILLION/UL (ref 3.81–5.12)
SODIUM SERPL-SCNC: 140 MMOL/L (ref 136–145)
TRIGL SERPL-MCNC: 106 MG/DL
TSH SERPL DL<=0.05 MIU/L-ACNC: 0.55 UIU/ML (ref 0.36–3.74)
WBC # BLD AUTO: 4.39 THOUSAND/UL (ref 4.31–10.16)

## 2020-09-14 PROCEDURE — 84443 ASSAY THYROID STIM HORMONE: CPT

## 2020-09-14 PROCEDURE — 36415 COLL VENOUS BLD VENIPUNCTURE: CPT

## 2020-09-14 PROCEDURE — 80053 COMPREHEN METABOLIC PANEL: CPT

## 2020-09-14 PROCEDURE — 85025 COMPLETE CBC W/AUTO DIFF WBC: CPT

## 2020-09-14 PROCEDURE — 80061 LIPID PANEL: CPT

## 2020-09-14 PROCEDURE — 83036 HEMOGLOBIN GLYCOSYLATED A1C: CPT

## 2020-09-15 LAB
EST. AVERAGE GLUCOSE BLD GHB EST-MCNC: 108 MG/DL
HBA1C MFR BLD: 5.4 %

## 2020-09-17 ENCOUNTER — OFFICE VISIT (OUTPATIENT)
Dept: INTERNAL MEDICINE CLINIC | Facility: CLINIC | Age: 40
End: 2020-09-17
Payer: COMMERCIAL

## 2020-09-17 VITALS
BODY MASS INDEX: 27.11 KG/M2 | WEIGHT: 158.8 LBS | DIASTOLIC BLOOD PRESSURE: 80 MMHG | SYSTOLIC BLOOD PRESSURE: 110 MMHG | HEIGHT: 64 IN | HEART RATE: 70 BPM | RESPIRATION RATE: 12 BRPM | TEMPERATURE: 98.1 F

## 2020-09-17 DIAGNOSIS — R73.01 IMPAIRED FASTING GLUCOSE: ICD-10-CM

## 2020-09-17 DIAGNOSIS — M79.7 FIBROMYALGIA: Primary | ICD-10-CM

## 2020-09-17 DIAGNOSIS — F41.1 ANXIETY STATE: ICD-10-CM

## 2020-09-17 DIAGNOSIS — J45.20 MILD INTERMITTENT EXTRINSIC ASTHMA WITHOUT COMPLICATION: ICD-10-CM

## 2020-09-17 DIAGNOSIS — Z11.4 SCREENING FOR HIV (HUMAN IMMUNODEFICIENCY VIRUS): ICD-10-CM

## 2020-09-17 DIAGNOSIS — E78.5 HYPERLIPIDEMIA, UNSPECIFIED HYPERLIPIDEMIA TYPE: ICD-10-CM

## 2020-09-17 DIAGNOSIS — Z00.00 WELL ADULT EXAM: ICD-10-CM

## 2020-09-17 DIAGNOSIS — N94.6 SEVERE MENSTRUAL CRAMPS: ICD-10-CM

## 2020-09-17 PROCEDURE — 1036F TOBACCO NON-USER: CPT | Performed by: INTERNAL MEDICINE

## 2020-09-17 PROCEDURE — 99395 PREV VISIT EST AGE 18-39: CPT | Performed by: INTERNAL MEDICINE
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
## 2020-09-17 NOTE — PATIENT INSTRUCTIONS
Lab data reviewed in detail and compared prior    Fibromyalgia as well as severe menstrual cramps-okay to use ibuprofen and or Tylenol as needed for pain      Hyperlipidemia-dramatic improvement with dietary modification, continue with same    Impaired fasting glucose stable with A1c 5 4    Health maintenance-up-to-date with well-woman with Dr Samuel Springer, flu shot recommended next month    Follow-up yearly after labs, sooner as needed

## 2020-10-15 ENCOUNTER — CLINICAL SUPPORT (OUTPATIENT)
Dept: INTERNAL MEDICINE CLINIC | Facility: CLINIC | Age: 40
End: 2020-10-15
Payer: COMMERCIAL

## 2020-10-15 DIAGNOSIS — Z23 NEED FOR INFLUENZA VACCINATION: Primary | ICD-10-CM

## 2020-10-15 PROCEDURE — 90686 IIV4 VACC NO PRSV 0.5 ML IM: CPT

## 2020-10-15 PROCEDURE — 90471 IMMUNIZATION ADMIN: CPT

## 2020-11-06 DIAGNOSIS — J30.1 SEASONAL ALLERGIC RHINITIS DUE TO POLLEN: ICD-10-CM

## 2020-11-06 RX ORDER — FLUTICASONE PROPIONATE 50 MCG
SPRAY, SUSPENSION (ML) NASAL
Qty: 16 ML | Refills: 0 | Status: SHIPPED | OUTPATIENT
Start: 2020-11-06 | End: 2021-02-26

## 2020-11-23 ENCOUNTER — TRANSCRIBE ORDERS (OUTPATIENT)
Dept: LAB | Facility: CLINIC | Age: 40
End: 2020-11-23

## 2020-11-23 ENCOUNTER — APPOINTMENT (OUTPATIENT)
Dept: RADIOLOGY | Facility: CLINIC | Age: 40
End: 2020-11-23
Payer: COMMERCIAL

## 2020-11-23 ENCOUNTER — CONSULT (OUTPATIENT)
Dept: INTERNAL MEDICINE CLINIC | Facility: CLINIC | Age: 40
End: 2020-11-23
Payer: COMMERCIAL

## 2020-11-23 ENCOUNTER — LAB (OUTPATIENT)
Dept: LAB | Facility: CLINIC | Age: 40
End: 2020-11-23
Payer: COMMERCIAL

## 2020-11-23 VITALS
HEIGHT: 64 IN | DIASTOLIC BLOOD PRESSURE: 70 MMHG | RESPIRATION RATE: 14 BRPM | HEART RATE: 76 BPM | BODY MASS INDEX: 26.98 KG/M2 | SYSTOLIC BLOOD PRESSURE: 96 MMHG | TEMPERATURE: 97.6 F | WEIGHT: 158 LBS

## 2020-11-23 DIAGNOSIS — Z98.811 DENTAL FILLING STATUS: Primary | ICD-10-CM

## 2020-11-23 DIAGNOSIS — Z01.818 PREOP EXAMINATION: ICD-10-CM

## 2020-11-23 DIAGNOSIS — M21.611 BUNION, RIGHT FOOT: ICD-10-CM

## 2020-11-23 DIAGNOSIS — M21.611 BUNION, RIGHT FOOT: Primary | ICD-10-CM

## 2020-11-23 DIAGNOSIS — Z98.811 DENTAL FILLING STATUS: ICD-10-CM

## 2020-11-23 DIAGNOSIS — Z01.818 PREOP EXAMINATION: Primary | ICD-10-CM

## 2020-11-23 LAB
ANION GAP SERPL CALCULATED.3IONS-SCNC: 5 MMOL/L (ref 4–13)
APTT PPP: 37 SECONDS (ref 23–37)
BASOPHILS # BLD AUTO: 0.03 THOUSANDS/ΜL (ref 0–0.1)
BASOPHILS NFR BLD AUTO: 1 % (ref 0–1)
BUN SERPL-MCNC: 15 MG/DL (ref 5–25)
CALCIUM SERPL-MCNC: 9.2 MG/DL (ref 8.3–10.1)
CHLORIDE SERPL-SCNC: 113 MMOL/L (ref 100–108)
CO2 SERPL-SCNC: 25 MMOL/L (ref 21–32)
CREAT SERPL-MCNC: 0.7 MG/DL (ref 0.6–1.3)
EOSINOPHIL # BLD AUTO: 0.04 THOUSAND/ΜL (ref 0–0.61)
EOSINOPHIL NFR BLD AUTO: 1 % (ref 0–6)
ERYTHROCYTE [DISTWIDTH] IN BLOOD BY AUTOMATED COUNT: 13.2 % (ref 11.6–15.1)
GFR SERPL CREATININE-BSD FRML MDRD: 126 ML/MIN/1.73SQ M
GLUCOSE P FAST SERPL-MCNC: 88 MG/DL (ref 65–99)
HCT VFR BLD AUTO: 42.7 % (ref 34.8–46.1)
HGB BLD-MCNC: 13.5 G/DL (ref 11.5–15.4)
IMM GRANULOCYTES # BLD AUTO: 0.01 THOUSAND/UL (ref 0–0.2)
IMM GRANULOCYTES NFR BLD AUTO: 0 % (ref 0–2)
INR PPP: 1.03 (ref 0.84–1.19)
LYMPHOCYTES # BLD AUTO: 1.71 THOUSANDS/ΜL (ref 0.6–4.47)
LYMPHOCYTES NFR BLD AUTO: 38 % (ref 14–44)
MCH RBC QN AUTO: 27.3 PG (ref 26.8–34.3)
MCHC RBC AUTO-ENTMCNC: 31.6 G/DL (ref 31.4–37.4)
MCV RBC AUTO: 86 FL (ref 82–98)
MONOCYTES # BLD AUTO: 0.34 THOUSAND/ΜL (ref 0.17–1.22)
MONOCYTES NFR BLD AUTO: 8 % (ref 4–12)
NEUTROPHILS # BLD AUTO: 2.43 THOUSANDS/ΜL (ref 1.85–7.62)
NEUTS SEG NFR BLD AUTO: 52 % (ref 43–75)
NRBC BLD AUTO-RTO: 0 /100 WBCS
PLATELET # BLD AUTO: 303 THOUSANDS/UL (ref 149–390)
PMV BLD AUTO: 10.5 FL (ref 8.9–12.7)
POTASSIUM SERPL-SCNC: 3.7 MMOL/L (ref 3.5–5.3)
PROTHROMBIN TIME: 13.5 SECONDS (ref 11.6–14.5)
RBC # BLD AUTO: 4.95 MILLION/UL (ref 3.81–5.12)
SODIUM SERPL-SCNC: 143 MMOL/L (ref 136–145)
WBC # BLD AUTO: 4.56 THOUSAND/UL (ref 4.31–10.16)

## 2020-11-23 PROCEDURE — 93000 ELECTROCARDIOGRAM COMPLETE: CPT | Performed by: NURSE PRACTITIONER

## 2020-11-23 PROCEDURE — 1036F TOBACCO NON-USER: CPT | Performed by: NURSE PRACTITIONER

## 2020-11-23 PROCEDURE — 99242 OFF/OP CONSLTJ NEW/EST SF 20: CPT | Performed by: NURSE PRACTITIONER

## 2020-11-23 PROCEDURE — 85730 THROMBOPLASTIN TIME PARTIAL: CPT

## 2020-11-23 PROCEDURE — 83825 ASSAY OF MERCURY: CPT

## 2020-11-23 PROCEDURE — 71046 X-RAY EXAM CHEST 2 VIEWS: CPT

## 2020-11-23 PROCEDURE — 3725F SCREEN DEPRESSION PERFORMED: CPT | Performed by: NURSE PRACTITIONER

## 2020-11-23 PROCEDURE — 85610 PROTHROMBIN TIME: CPT

## 2020-11-23 PROCEDURE — 80048 BASIC METABOLIC PNL TOTAL CA: CPT

## 2020-11-23 PROCEDURE — 85025 COMPLETE CBC W/AUTO DIFF WBC: CPT

## 2020-11-23 PROCEDURE — 36415 COLL VENOUS BLD VENIPUNCTURE: CPT

## 2020-11-23 PROCEDURE — 83655 ASSAY OF LEAD: CPT

## 2020-11-23 PROCEDURE — 3008F BODY MASS INDEX DOCD: CPT | Performed by: NURSE PRACTITIONER

## 2020-11-23 PROCEDURE — 82175 ASSAY OF ARSENIC: CPT

## 2020-11-23 PROCEDURE — 82300 ASSAY OF CADMIUM: CPT

## 2020-11-26 LAB
ARSENIC BLD-MCNC: 9 UG/L (ref 2–23)
CADMIUM BLD-MCNC: 0.6 UG/L (ref 0–1.2)
LEAD BLD-MCNC: <1 UG/DL (ref 0–4)
MERCURY BLD-MCNC: 2.9 UG/L (ref 0–14.9)

## 2020-11-27 ENCOUNTER — TELEPHONE (OUTPATIENT)
Dept: INTERNAL MEDICINE CLINIC | Facility: CLINIC | Age: 40
End: 2020-11-27

## 2020-12-01 ENCOUNTER — TELEPHONE (OUTPATIENT)
Dept: INTERNAL MEDICINE CLINIC | Facility: CLINIC | Age: 40
End: 2020-12-01

## 2021-02-26 DIAGNOSIS — J30.1 SEASONAL ALLERGIC RHINITIS DUE TO POLLEN: ICD-10-CM

## 2021-02-26 RX ORDER — FLUTICASONE PROPIONATE 50 MCG
SPRAY, SUSPENSION (ML) NASAL
Qty: 16 ML | Refills: 0 | Status: SHIPPED | OUTPATIENT
Start: 2021-02-26 | End: 2021-04-08

## 2021-04-07 ENCOUNTER — EVALUATION (OUTPATIENT)
Dept: PHYSICAL THERAPY | Facility: CLINIC | Age: 41
End: 2021-04-07
Payer: COMMERCIAL

## 2021-04-07 DIAGNOSIS — J30.1 SEASONAL ALLERGIC RHINITIS DUE TO POLLEN: ICD-10-CM

## 2021-04-07 DIAGNOSIS — M79.674 PAIN IN RIGHT TOE(S): Primary | ICD-10-CM

## 2021-04-07 PROCEDURE — 97161 PT EVAL LOW COMPLEX 20 MIN: CPT | Performed by: PHYSICAL THERAPIST

## 2021-04-07 PROCEDURE — 97140 MANUAL THERAPY 1/> REGIONS: CPT | Performed by: PHYSICAL THERAPIST

## 2021-04-07 NOTE — PROGRESS NOTES
PT Evaluation     Today's date: 2021  Patient name: Yadira Adams  : 1980  MRN: [id number]  Referring provider: Faustino Benitez DPM  Dx:   Encounter Diagnosis     ICD-10-CM    1  Pain in right toe(s)  M79 674        Start Time: 1415  Stop Time: 1500  Total time in clinic (min): 45 minutes    Assessment  Assessment details: Patient presents with R toe pain secondary to bunion removal 2020; ambulates I with decreased push-off R; antalgic, demonstrates decreased Rom and Strength, reports difficulty standing, walking; scar well healed; patient would benefit from therapeutic exercise, manual therapy, gait and stability training, modalities PRN; patient issued HEP  Impairments: abnormal gait, abnormal or restricted ROM, impaired physical strength and pain with function  Understanding of Dx/Px/POC: good   Prognosis: good    Goals  STGs  1  Decrease pain 50% in 3 weeks  2  Increase Rom 50% in 3 weeks  3  Increase Strength 50% in 3 weeks  4  Compliant with HEP in 3 weeks  LTGs  1  Decrease pain to mild to none in 6 weeks  2  Increase Rom WNL in 6 weeks  3  Increase Strength WNL in 6 weeks  4  Able to stand with mild to no difficulty in 6 weeks  5   Able to walk with mild to no difficulty in 6 weeks    Plan  Planned modality interventions: hydrotherapy  Planned therapy interventions: manual therapy, neuromuscular re-education, patient education, therapeutic exercise, home exercise program, functional ROM exercises, gait training and balance  Frequency: 2x week  Duration in weeks: 6        Subjective Evaluation    History of Present Illness  Mechanism of injury: R toe bunion removal 2020          Not a recurrent problem   Quality of life: good    Pain  Current pain ratin  At best pain ratin  At worst pain ratin  Quality: throbbing and needle-like  Relieving factors: relaxation  Aggravating factors: walking and standing    Social Support  Steps to enter house: yes  Stairs in house: yes Lives in: multiple-level home  Lives with: significant other and parents      Diagnostic Tests  X-ray: normal  Patient Goals  Patient goals for therapy: increased strength, increased motion and decreased pain  Patient goal: stand, walk        Objective     Active Range of Motion     Right Ankle/Foot   Great toe flexion: 0 degrees   Great toe extension: 10 degrees     Strength/Myotome Testing     Right Ankle/Foot   Great toe flexion: 4-  Great toe extension: 4-    Ambulation     Ambulation: Level Surfaces     Additional Level Surfaces Ambulation Details  Ambulates I with decreased push-off R; antalgic             Precautions: none      Manual 4/7            Scar massage 10            Jt mobs 5            MFR                          Neuro Re-Ed             Stance on foam             Stability disc             Foam balance beam                                                                 Ther Ex             [place]              Towel scrunch             Arom             Prom             Manual resistance PREs             HEP                                       Ther Activity                                       Gait Training             gym             Tm             Modalities

## 2021-04-07 NOTE — LETTER
2021    Altaf Hidalgo, [address]  [facility] [address]    Patient: Jerzy Palacios   YOB: 1980   Date of Visit: 2021     Encounter Diagnosis     ICD-10-CM    1  Pain in right toe(s)  M79 674        Dear Dr Zaidi Aas:    Thank you for your recent referral of Jerzy Palacios  Please review the attached evaluation summary from Ben's recent visit  Please verify that you agree with the plan of care by signing the attached order  If you have any questions or concerns, please do not hesitate to call  I sincerely appreciate the opportunity to share in the care of one of your patients and hope to have another opportunity to work with you in the near future  Sincerely,    Darion Montenegro, PT      Referring Provider:      I certify that I have read the below Plan of Care and certify the need for these services furnished under this plan of treatment while under my care  Altaf Hidalgo DPM  [address]  [facility] [address]  Via Fax: [phone]          PT Evaluation     Today's date: 2021  Patient name: Jerzy Palacios  : 1980  MRN: [id number]  Referring provider: Reg Sorensen DPM  Dx:   Encounter Diagnosis     ICD-10-CM    1   Pain in right toe(s)  M79 674        Start Time: 1415  Stop Time: 1500  Total time in clinic (min): 45 minutes    Assessment  Assessment details: Patient presents with R toe pain secondary to bunion removal 2020; ambulates I with decreased push-off R; antalgic, demonstrates decreased Rom and Strength, reports difficulty standing, walking; scar well healed; patient would benefit from therapeutic exercise, manual therapy, gait and stability training, modalities PRN; patient issued HEP  Impairments: abnormal gait, abnormal or restricted ROM, impaired physical strength and pain with function  Understanding of Dx/Px/POC: good   Prognosis: good    Goals  STGs  1  Decrease pain 50% in 3 weeks  2  Increase Rom 50% in 3 weeks  3  Increase Strength 50% in 3 weeks  4  Compliant with HEP in 3 weeks  LTGs  1  Decrease pain to mild to none in 6 weeks  2  Increase Rom WNL in 6 weeks  3  Increase Strength WNL in 6 weeks  4  Able to stand with mild to no difficulty in 6 weeks  5   Able to walk with mild to no difficulty in 6 weeks    Plan  Planned modality interventions: hydrotherapy  Planned therapy interventions: manual therapy, neuromuscular re-education, patient education, therapeutic exercise, home exercise program, functional ROM exercises, gait training and balance  Frequency: 2x week  Duration in weeks: 6        Subjective Evaluation    History of Present Illness  Mechanism of injury: R toe bunion removal 2020          Not a recurrent problem   Quality of life: good    Pain  Current pain ratin  At best pain ratin  At worst pain ratin  Quality: throbbing and needle-like  Relieving factors: relaxation  Aggravating factors: walking and standing    Social Support  Steps to enter house: yes  Stairs in house: yes   Lives in: multiple-level home  Lives with: significant other and parents      Diagnostic Tests  X-ray: normal  Patient Goals  Patient goals for therapy: increased strength, increased motion and decreased pain  Patient goal: stand, walk        Objective     Active Range of Motion     Right Ankle/Foot   Great toe flexion: 0 degrees   Great toe extension: 10 degrees     Strength/Myotome Testing     Right Ankle/Foot   Great toe flexion: 4-  Great toe extension: 4-    Ambulation     Ambulation: Level Surfaces     Additional Level Surfaces Ambulation Details  Ambulates I with decreased push-off R; antalgic             Precautions: none      Manual             Scar massage 10            Jt mobs 5            MFR                          Neuro Re-Ed             Stance on foam             Stability disc             Foam balance beam Ther Ex             [place]              Towel scrunch             Arom             Prom             Manual resistance PREs             HEP                                       Ther Activity                                       Gait Training             gym             Tm             Modalities

## 2021-04-08 ENCOUNTER — TRANSCRIBE ORDERS (OUTPATIENT)
Dept: PHYSICAL THERAPY | Facility: CLINIC | Age: 41
End: 2021-04-08

## 2021-04-08 DIAGNOSIS — M79.674 PAIN IN TOE OF RIGHT FOOT: Primary | ICD-10-CM

## 2021-04-08 RX ORDER — FLUTICASONE PROPIONATE 50 MCG
SPRAY, SUSPENSION (ML) NASAL
Qty: 16 ML | Refills: 0 | Status: SHIPPED | OUTPATIENT
Start: 2021-04-08 | End: 2021-05-03

## 2021-04-12 ENCOUNTER — OFFICE VISIT (OUTPATIENT)
Dept: PHYSICAL THERAPY | Facility: CLINIC | Age: 41
End: 2021-04-12
Payer: COMMERCIAL

## 2021-04-12 DIAGNOSIS — M79.674 PAIN IN RIGHT TOE(S): Primary | ICD-10-CM

## 2021-04-12 PROCEDURE — 97110 THERAPEUTIC EXERCISES: CPT | Performed by: PHYSICAL THERAPIST

## 2021-04-12 PROCEDURE — 97140 MANUAL THERAPY 1/> REGIONS: CPT | Performed by: PHYSICAL THERAPIST

## 2021-04-12 NOTE — PROGRESS NOTES
Daily Note     Today's date: 2021  Patient name: Venus Merritt  : 1980  MRN: [id number]  Referring provider: Milagro Sewell DPM  Dx:   Encounter Diagnosis     ICD-10-CM    1   Pain in right toe(s)  M79 674        Start Time: 1230  Stop Time: 1315  Total time in clinic (min): 45 minutes    Subjective: patient reports R toe pain 3-4/10 today      Objective: See treatment diary below      Assessment: improved mobility post manual therapy,a/prom; compliant with HEP; no increase in pain reported      Plan: Cont POC     Precautions: none      Manual            Scar massage 10 10           Jt mobs 5 5           MFR                          Neuro Re-Ed             Stance on foam             Stability disc             Foam balance beam                                                                 Ther Ex             [place]   10           Towel scrunch             Arom  x30 flex           Prom  15           Manual resistance PREs             HEP                                       Ther Activity                                       Gait Training             gym             Tm             Modalities

## 2021-04-14 ENCOUNTER — OFFICE VISIT (OUTPATIENT)
Dept: PHYSICAL THERAPY | Facility: CLINIC | Age: 41
End: 2021-04-14
Payer: COMMERCIAL

## 2021-04-14 DIAGNOSIS — M79.674 PAIN IN RIGHT TOE(S): Primary | ICD-10-CM

## 2021-04-14 PROCEDURE — 97110 THERAPEUTIC EXERCISES: CPT

## 2021-04-14 NOTE — PROGRESS NOTES
Daily Note     Today's date: 2021  Patient name: Angus Gomez  : 1980  MRN: [id number]  Referring provider: Niels Martin DPM  Dx:   Encounter Diagnosis     ICD-10-CM    1  Pain in right toe(s)  M79 674        Start Time: 1500  Stop Time: 1545  Total time in clinic (min): 45 minutes    Subjective: Patient stated her pain is a 7/10 in the toe  Stated she has difficulty walking sometimes  Objective: See treatment diary below      Assessment: Patient tolerated session well  Added rec  Bike and pro stretch to improve mobility  Patient was challenged w/ marble pick ups and would benefit from continued skilled PT  Patient had improved mobility post PROM  Plan: Continue per plan of care        Precautions: none      Manual           Scar massage 10 10           Jt mobs 5 5           MFR                          Neuro Re-Ed             Stance on foam             Stability disc   1'          Foam balance beam                                                                 Ther Ex             [place]   10 10'          Towel scrunch             Arom  x30 flex 30x          Prom  15 15'          Manual resistance PREs             HEP             bike   10'                       Ther Activity                                       Gait Training             gym             Tm             Modalities

## 2021-04-19 ENCOUNTER — OFFICE VISIT (OUTPATIENT)
Dept: PHYSICAL THERAPY | Facility: CLINIC | Age: 41
End: 2021-04-19
Payer: COMMERCIAL

## 2021-04-19 DIAGNOSIS — M79.674 PAIN IN RIGHT TOE(S): Primary | ICD-10-CM

## 2021-04-19 PROCEDURE — 97140 MANUAL THERAPY 1/> REGIONS: CPT

## 2021-04-19 PROCEDURE — 97112 NEUROMUSCULAR REEDUCATION: CPT

## 2021-04-19 PROCEDURE — 97116 GAIT TRAINING THERAPY: CPT

## 2021-04-19 PROCEDURE — 97110 THERAPEUTIC EXERCISES: CPT

## 2021-04-19 NOTE — PROGRESS NOTES
Daily Note     Today's date: 2021  Patient name: Jung Perez  : 1980  MRN: [id number]  Referring provider: Jan Fernandez DPM  Dx:   Encounter Diagnosis     ICD-10-CM    1  Pain in right toe(s)  M79 674        Start Time: 1500  Stop Time: 1545  Total time in clinic (min): 45 minutes    Subjective: Patient reports pain in toe is 6/10 today  Stated she felt tired post last session  Objective: See treatment diary below      Assessment: Patient tolerated session well  Increased balance stability activities  Pain and pressure reported in foot w/ balance activities  Added TM to work on ambulation w/ improved stride length and heel-toe  Patient would benefit from continued skilled PT  Plan: Continue per plan of care        Precautions: none      Manual          Scar massage 10 10  5'         Jt mobs 5 5           MFR    10'                      Neuro Re-Ed             Stance on foam    SLS 10" 5x         Stability disc   1' 2'         Foam balance beam                                                                 Ther Ex             [place]   10 10' HEP         Towel scrunch             Arom  x30 flex 30x          Prom  15 15' 10'         Manual resistance PREs             HEP             bike   10' 10'         Pro stretch    20" 5x         Ther Activity                                       Gait Training             gym             Tm    5'         Modalities

## 2021-04-21 ENCOUNTER — OFFICE VISIT (OUTPATIENT)
Dept: PHYSICAL THERAPY | Facility: CLINIC | Age: 41
End: 2021-04-21
Payer: COMMERCIAL

## 2021-04-21 DIAGNOSIS — M79.674 PAIN IN RIGHT TOE(S): Primary | ICD-10-CM

## 2021-04-21 PROCEDURE — 97112 NEUROMUSCULAR REEDUCATION: CPT

## 2021-04-21 PROCEDURE — 97110 THERAPEUTIC EXERCISES: CPT

## 2021-04-21 NOTE — PROGRESS NOTES
Daily Note     Today's date: 2021  Patient name: Margarito Mccyo  : 1980  MRN: [id number]  Referring provider: Mariaelena Del Real DPM  Dx:   Encounter Diagnosis     ICD-10-CM    1  Pain in right toe(s)  M79 674        Start Time: 1500  Stop Time: 1545  Total time in clinic (min): 45 minutes    Subjective: Patient reported pain in toe is improving  Stated she is walking a little better  Objective: See treatment diary below      Assessment: Patient demonstrates improved ambulation but still has slight antalgic gait pattern  Patient felt fatigue w/ balance exercises  Pain in toe w/ standing in grn disc  Reduced pain and tightness post PROM and gentle traction of great toe  Patient would benefit from continued skilled PT  Plan: Continue per plan of care  Precautions: none      Manual         Scar massage 10 10  5'         Jt mobs 5 5           MFR    10'                      Neuro Re-Ed             Stance on foam    SLS 10" 5x SLS 15" 5x        Stability disc   1' 2' 2' p!         Foam balance beam             Standing on toes/heels     20x                                               Ther Ex             [place]   10 10' HEP         Towel scrunch             Arom  x30 flex 30x          Prom  15 15' 10' 15'        Manual resistance PREs             HEP             bike   10' 10' 10'        BAPS board     20x ea dir        Pro stretch    20" 5x 5x        Ther Activity                                       Gait Training             gym             Tm    5'         Modalities

## 2021-04-26 ENCOUNTER — OFFICE VISIT (OUTPATIENT)
Dept: PHYSICAL THERAPY | Facility: CLINIC | Age: 41
End: 2021-04-26
Payer: COMMERCIAL

## 2021-04-26 DIAGNOSIS — M79.674 PAIN IN RIGHT TOE(S): Primary | ICD-10-CM

## 2021-04-26 PROCEDURE — 97110 THERAPEUTIC EXERCISES: CPT | Performed by: PHYSICAL THERAPIST

## 2021-04-26 PROCEDURE — 97112 NEUROMUSCULAR REEDUCATION: CPT | Performed by: PHYSICAL THERAPIST

## 2021-04-26 NOTE — PROGRESS NOTES
Daily Note     Today's date: 2021  Patient name: Owen Zazueta  : 1980  MRN: [id number]  Referring provider: Dipika Vazquez DPM  Dx:   Encounter Diagnosis     ICD-10-CM    1  Pain in right toe(s)  M79 674        Start Time: 1500  Stop Time: 1545  Total time in clinic (min): 45 minutes    Subjective: Patient reports 6-7/10 pain in toe when walking      Objective: See treatment diary below      Assessment:  Tolerated foam balance beam with good posture, mobility continues to improve, scar softening      Plan: Continue per plan of care        Precautions: none      Manual        Scar massage 10 10  5'  5'       Jt mobs 5 5           MFR    10'                      Neuro Re-Ed             Stance on foam    SLS 10" 5x SLS 15" 5x SLS  15" 5x       Stability disc   1' 2' 2' p! 5'       Foam balance beam      5'       Standing on toes/heels     20x                                               Ther Ex             [place]   10 10' HEP         Towel scrunch             Arom  x30 flex 30x          Prom  15 15' 10' 15'        Manual resistance PREs             HEP             bike   8' 10' 10' 10'       BAPS board     20x ea dir 20x ea       Pro stretch    20" 5x 5x 5x       Ther Activity                                       Gait Training             gym             Tm    5'         Modalities

## 2021-04-28 ENCOUNTER — OFFICE VISIT (OUTPATIENT)
Dept: PHYSICAL THERAPY | Facility: CLINIC | Age: 41
End: 2021-04-28
Payer: COMMERCIAL

## 2021-04-28 DIAGNOSIS — M79.674 PAIN IN RIGHT TOE(S): Primary | ICD-10-CM

## 2021-04-28 PROCEDURE — 97112 NEUROMUSCULAR REEDUCATION: CPT | Performed by: PHYSICAL THERAPIST

## 2021-04-28 PROCEDURE — 97140 MANUAL THERAPY 1/> REGIONS: CPT | Performed by: PHYSICAL THERAPIST

## 2021-04-28 PROCEDURE — 97116 GAIT TRAINING THERAPY: CPT | Performed by: PHYSICAL THERAPIST

## 2021-04-28 NOTE — PROGRESS NOTES
Daily Note     Today's date: 2021  Patient name: Estrellita Patricia  : 1980  MRN: [id number]  Referring provider: Judson Koyanagi, DPM  Dx:   Encounter Diagnosis     ICD-10-CM    1  Pain in right toe(s)  M79 674        Start Time: 1500  Stop Time: 1545  Total time in clinic (min): 45 minutes    Subjective: Patient reports 6-7/10 pain in toe when walking      Objective: See treatment diary below      Assessment: good mechanics during TM walking, mobility and stability improving      Plan: Continue per plan of care        Precautions: none      Manual       Scar massage 10 10  5'  5' 5      Jt mobs 5 5     5      MFR    10'                      Neuro Re-Ed             Stance on foam    SLS 10" 5x SLS 15" 5x SLS  15" 5x x5      Stability disc   1' 2' 2' p! 5' 5'      Foam balance beam      5' 5'      Standing on toes/heels     20x                                               Ther Ex             [place]   10 10' HEP         Towel scrunch             Arom  x30 flex 30x          Prom  15 15' 10' 15'        Manual resistance PREs             HEP             bike   8' 10' 10' 10'       BAPS board     20x ea dir 20x ea       Pro stretch    20" 5x 5x 5x       Ther Activity                                       Gait Training             gym             Tm    5'   10'      Modalities

## 2021-05-02 DIAGNOSIS — J30.1 SEASONAL ALLERGIC RHINITIS DUE TO POLLEN: ICD-10-CM

## 2021-05-03 RX ORDER — FLUTICASONE PROPIONATE 50 MCG
SPRAY, SUSPENSION (ML) NASAL
Qty: 16 ML | Refills: 0 | Status: SHIPPED | OUTPATIENT
Start: 2021-05-03 | End: 2021-06-09 | Stop reason: SDUPTHER

## 2021-05-06 ENCOUNTER — OFFICE VISIT (OUTPATIENT)
Dept: PHYSICAL THERAPY | Facility: CLINIC | Age: 41
End: 2021-05-06
Payer: COMMERCIAL

## 2021-05-06 DIAGNOSIS — M79.674 PAIN IN RIGHT TOE(S): Primary | ICD-10-CM

## 2021-05-06 PROCEDURE — 97110 THERAPEUTIC EXERCISES: CPT

## 2021-05-06 PROCEDURE — 97116 GAIT TRAINING THERAPY: CPT

## 2021-05-06 PROCEDURE — 97112 NEUROMUSCULAR REEDUCATION: CPT

## 2021-05-06 NOTE — PROGRESS NOTES
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 5' 5' 5'     Foam balance beam      5' 5'      Standing on toes/heels     20x   20x                                            Ther Ex             [place]   10 10' HEP         Towel scrunch             Arom  x30 flex 30x          Prom  15 15' 10' 15'   10'     Manual resistance PREs             HEP             bike   10' 10' 10' 10'  10'     BAPS board     20x ea dir 20x ea       Pro stretch    20" 5x 5x 5x       Ther Activity             Walking on toes // bars        1 laps                  Gait Training             gym             Tm    5'   10' 10'     Modalities

## 2021-05-10 ENCOUNTER — APPOINTMENT (OUTPATIENT)
Dept: PHYSICAL THERAPY | Facility: CLINIC | Age: 41
End: 2021-05-10
Payer: COMMERCIAL

## 2021-05-12 ENCOUNTER — OFFICE VISIT (OUTPATIENT)
Dept: PHYSICAL THERAPY | Facility: CLINIC | Age: 41
End: 2021-05-12
Payer: COMMERCIAL

## 2021-05-12 DIAGNOSIS — M79.674 PAIN IN RIGHT TOE(S): Primary | ICD-10-CM

## 2021-05-12 PROCEDURE — 97140 MANUAL THERAPY 1/> REGIONS: CPT

## 2021-05-12 PROCEDURE — 97116 GAIT TRAINING THERAPY: CPT

## 2021-05-12 PROCEDURE — 97110 THERAPEUTIC EXERCISES: CPT

## 2021-05-12 NOTE — PROGRESS NOTES
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 5' 5' 5' 5'    Foam balance beam      5' 5'      Standing on toes/heels     20x   20x 20x    Side stepping         5 laps                              Ther Ex             [place]   10 10' HEP         Towel scrunch             Arom  x30 flex 30x          Prom  15 15' 10' 15'   10' 10'    Manual resistance PREs             HEP             bike   10' 10' 10' 10'  10' 10' lvl 2    BAPS board     20x ea dir 20x ea       Pro stretch    20" 5x 5x 5x       Ther Activity             Walking on toes // bars        1 laps 2 laps                 Gait Training             gym             Tm    5'   10' 10' 10'    Modalities

## 2021-05-17 ENCOUNTER — OFFICE VISIT (OUTPATIENT)
Dept: PHYSICAL THERAPY | Facility: CLINIC | Age: 41
End: 2021-05-17
Payer: COMMERCIAL

## 2021-05-17 DIAGNOSIS — M79.674 PAIN IN RIGHT TOE(S): Primary | ICD-10-CM

## 2021-05-17 PROCEDURE — 97530 THERAPEUTIC ACTIVITIES: CPT

## 2021-05-17 PROCEDURE — 97110 THERAPEUTIC EXERCISES: CPT

## 2021-05-17 PROCEDURE — 97112 NEUROMUSCULAR REEDUCATION: CPT

## 2021-05-17 NOTE — PROGRESS NOTES
Daily Note     Today's date: 2021  Patient name: Marky Schumacher  : 1980  MRN: [id number]  Referring provider: Tree May DPM  Dx:   Encounter Diagnosis     ICD-10-CM    1  Pain in right toe(s)  M79 674        Start Time: 1415  Stop Time: 1504  Total time in clinic (min): 49 minutes    Subjective: Patient reports her toe is improving some, been trying to keep it moving and lose  Objective: See treatment diary below      Assessment: Patient tolerated session well  Improved ability to walk on toes but still has some c/o soreness  Added resistance w/ side stepping this session  Patient would benefit from continued skilled PT  Plan: Continue per plan of care        Precautions: none      Manual    Scar massage 10 10  5'  5' 5      Jt mobs 5 5     5      MFR    10'     8'                 Neuro Re-Ed             Stance on foam    SLS 10" 5x SLS 15" 5x SLS  15" 5x x5      Stability disc   1' 2' 2' p! 5' 5' 5' 5'    Foam balance beam      5' 5'      Standing on toes/heels     20x   20x 20x 20x   Side stepping         5 laps RTB 3 laps   Bosu lunges          15x ea                Ther Ex             [place]   10 10' HEP         Towel scrunch             Arom  x30 flex 30x          Prom  15 15' 10' 15'   10' 10' 5'   Manual resistance PREs             HEP             bike   10' 10' 10' 10'  10' 10' lvl 2 10' lvl 2   BAPS board     20x ea dir 20x ea       Calf stretches           30" 3x   Pro stretch    20" 5x 5x 5x       Ther Activity             Walking on toes // bars        1 laps 2 laps 2 laps   TRX squats             Gait Training             gym             Tm    5'   10' 10' 10' 10'   Modalities

## 2021-05-20 ENCOUNTER — EVALUATION (OUTPATIENT)
Dept: PHYSICAL THERAPY | Facility: CLINIC | Age: 41
End: 2021-05-20
Payer: COMMERCIAL

## 2021-05-20 ENCOUNTER — TRANSCRIBE ORDERS (OUTPATIENT)
Dept: PHYSICAL THERAPY | Facility: CLINIC | Age: 41
End: 2021-05-20

## 2021-05-20 DIAGNOSIS — M79.674 PAIN IN RIGHT TOE(S): Primary | ICD-10-CM

## 2021-05-20 PROCEDURE — 97112 NEUROMUSCULAR REEDUCATION: CPT | Performed by: PHYSICAL THERAPIST

## 2021-05-20 PROCEDURE — 97110 THERAPEUTIC EXERCISES: CPT | Performed by: PHYSICAL THERAPIST

## 2021-05-20 NOTE — LETTER
May 20, 2021    Roxanne Burton, [address]  [place]  [address] [address]    Patient: Dmitri Muse   YOB: 1980   Date of Visit: 2021     Encounter Diagnosis     ICD-10-CM    1  Pain in right toe(s)  M79 674        Dear Dr Ethan Ríos:    Thank you for your recent referral of Dmitri Muse  Please review the attached evaluation summary from Ben's recent visit  Please verify that you agree with the plan of care by signing the attached order  If you have any questions or concerns, please do not hesitate to call  I sincerely appreciate the opportunity to share in the care of one of your patients and hope to have another opportunity to work with you in the near future  Sincerely,    Marysol Butts, PT      Referring Provider:      I certify that I have read the below Plan of Care and certify the need for these services furnished under this plan of treatment while under my care  Roxanne Burton DPM  [address]  [place]  [place] [facility] [id number]  Via Fax: [phone]          RE-CERTIFICATION    Today's date: 2021  Patient name: Dmitri Muse  : 1980  MRN: [id number]  Referring provider: Mickie Townsend DPM  Dx:   Encounter Diagnosis     ICD-10-CM    1  Pain in right toe(s)  M79 674        Start Time: 1300  Stop Time: 1330  Total time in clinic (min): 30 minutes    Subjective: patient reports pain level R great toe 4/10 today      Objective: R LE Arom: great toe flexion 5; extension 42; Strength: great toe flexion 4/5; extension 4/5      Assessment: increased Arom and Strength; decreased pain; able to stand/walk with 50% decrease in difficulty      Plan: Continue per plan of care   2x/week - 4 weeks; STGs MET; LTGs 25% MET     Precautions: none      Manual    Scar massage 10 10  5'  5' 5      Jt mobs 5 5     5      MFR    10'     8' Neuro Re-Ed             Stance on foam    SLS 10" 5x SLS 15" 5x SLS  15" 5x x5      Stability disc   1' 2' 2' p! 5' 5' 5' 5'    Foam balance beam SL 5'     5' 5'      Standing on toes/heels x20    20x   20x 20x 20x   Side stepping         5 laps RTB 3 laps   Bosu lunges          15x ea                Ther Ex             [place]   10 10' HEP         Towel scrunch             Arom  x30 flex 30x          Prom 5' 15 15' 10' 15'   10' 10' 5'   Manual resistance PREs             HEP             bike   10' 10' 10' 10'  10' 10' lvl 2 10' lvl 2   BAPS board     20x ea dir 20x ea       Calf stretches           30" 3x   Pro stretch    20" 5x 5x 5x       Ther Activity             Walking on toes // bars        1 laps 2 laps 2 laps   TRX squats             Gait Training             gym             Tm    5'   10' 10' 10' 10'   Modalities

## 2021-05-20 NOTE — PROGRESS NOTES
RE-CERTIFICATION    Today's date: 2021  Patient name: Nelly Encinas  : 1980  MRN: [id number]  Referring provider: Jenn Colvin DPM  Dx:   Encounter Diagnosis     ICD-10-CM    1  Pain in right toe(s)  M79 674        Start Time: 1300  Stop Time: 1330  Total time in clinic (min): 30 minutes    Subjective: patient reports pain level R great toe 4/10 today      Objective: R LE Arom: great toe flexion 5; extension 42; Strength: great toe flexion 4/5; extension 4/5      Assessment: increased Arom and Strength; decreased pain; able to stand/walk with 50% decrease in difficulty      Plan: Continue per plan of care   2x/week - 4 weeks; STGs MET; LTGs 25% MET     Precautions: none      Manual    Scar massage 10 10  5'  5' 5      Jt mobs 5 5     5      MFR    10'     8'                 Neuro Re-Ed             Stance on foam    SLS 10" 5x SLS 15" 5x SLS  15" 5x x5      Stability disc   1' 2' 2' p! 5' 5' 5' 5'    Foam balance beam SL 5'     5' 5'      Standing on toes/heels x20    20x   20x 20x 20x   Side stepping         5 laps RTB 3 laps   Bosu lunges          15x ea                Ther Ex             [place]   10 10' HEP         Towel scrunch             Arom  x30 flex 30x          Prom 5' 15 15' 10' 15'   10' 10' 5'   Manual resistance PREs             HEP             bike   10' 10' 10' 10'  10' 10' lvl 2 10' lvl 2   BAPS board     20x ea dir 20x ea       Calf stretches           30" 3x   Pro stretch    20" 5x 5x 5x       Ther Activity             Walking on toes // bars        1 laps 2 laps 2 laps   TRX squats             Gait Training             gym             Tm    5'   10' 10' 10' 10'   Modalities

## 2021-05-25 ENCOUNTER — OFFICE VISIT (OUTPATIENT)
Dept: PHYSICAL THERAPY | Facility: CLINIC | Age: 41
End: 2021-05-25
Payer: COMMERCIAL

## 2021-05-25 DIAGNOSIS — M79.674 PAIN IN RIGHT TOE(S): Primary | ICD-10-CM

## 2021-05-25 PROCEDURE — 97140 MANUAL THERAPY 1/> REGIONS: CPT

## 2021-05-25 PROCEDURE — 97112 NEUROMUSCULAR REEDUCATION: CPT

## 2021-05-25 PROCEDURE — 97110 THERAPEUTIC EXERCISES: CPT

## 2021-05-25 PROCEDURE — 97116 GAIT TRAINING THERAPY: CPT

## 2021-05-25 NOTE — PROGRESS NOTES
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  Continued to perform PROM and scar massage to great toe which improved mobility  Patient would benefit from continued skilled PT  Plan: Continue per plan of care        Precautions: none      Manual    Scar massage 10 10'           Jt mobs 5            MFR         8'                 Neuro Re-Ed             Stance on foam  Rebounder SLS 2# 30x           Stability disc        5' 5'    Foam balance beam SL 5'            Standing on toes/heels x20 30x      20x 20x 20x   Side stepping         5 laps RTB 3 laps   Bosu lunges  15x ea        15x ea                Ther Ex             [place]              Towel scrunch             Arom             Prom 5' 5'      10' 10' 5'   Manual resistance PREs             HEP             bike        10' 10' lvl 2 10' lvl 2   BAPS board             Calf stretches           30" 3x   Pro stretch             Ther Activity             Walking on toes // bars  2 laps      1 laps 2 laps 2 laps   TRX squats  20x           Gait Training             gym             Tm  10'      10' 10' 10'   Modalities

## 2021-05-27 ENCOUNTER — OFFICE VISIT (OUTPATIENT)
Dept: PHYSICAL THERAPY | Facility: CLINIC | Age: 41
End: 2021-05-27
Payer: COMMERCIAL

## 2021-05-27 DIAGNOSIS — M79.674 PAIN IN RIGHT TOE(S): Primary | ICD-10-CM

## 2021-05-27 PROCEDURE — 97112 NEUROMUSCULAR REEDUCATION: CPT | Performed by: PHYSICAL THERAPIST

## 2021-05-27 PROCEDURE — 97116 GAIT TRAINING THERAPY: CPT | Performed by: PHYSICAL THERAPIST

## 2021-05-27 PROCEDURE — 97140 MANUAL THERAPY 1/> REGIONS: CPT | Performed by: PHYSICAL THERAPIST

## 2021-05-27 NOTE — PROGRESS NOTES
Daily Note     Today's date: 2021  Patient name: Amauri Harry  : 1980  MRN: [id number]  Referring provider: Reagan Luu DPM  Dx:   Encounter Diagnosis     ICD-10-CM    1  Pain in right toe(s)  M79 674        Start Time: 1515  Stop Time: 1545  Total time in clinic (min): 30 minutes    Subjective: Patient reports R toe pain 5/10 today      Objective: See treatment diary below      Assessment: verbal cueing to increase push-off during TM walking, good stability noted       Plan: Continue per plan of care        Precautions: none      Manual    Scar massage 10 10' 10          Jt mobs 5  5          MFR         8'                 Neuro Re-Ed             Stance on foam  Rebounder SLS 2# 30x           Stability disc   5     5' 5'    Foam balance beam SL 5'  x10          Standing on toes/heels x20 30x x30     20x 20x 20x   Side stepping         5 laps RTB 3 laps   Bosu lunges  15x ea        15x ea                Ther Ex             [place]              Towel scrunch             Arom             Prom 5' 5' 10     10' 10' 5'   Manual resistance PREs             HEP             bike        10' 10' lvl 2 10' lvl 2   BAPS board             Calf stretches           30" 3x   Pro stretch             Ther Activity             Walking on toes // bars  2 laps      1 laps 2 laps 2 laps   TRX squats  20x           Gait Training             gym             Tm  10' 10'     10' 10' 10'   Modalities

## 2021-06-01 ENCOUNTER — OFFICE VISIT (OUTPATIENT)
Dept: PHYSICAL THERAPY | Facility: CLINIC | Age: 41
End: 2021-06-01
Payer: COMMERCIAL

## 2021-06-01 DIAGNOSIS — M79.674 PAIN IN RIGHT TOE(S): Primary | ICD-10-CM

## 2021-06-01 PROCEDURE — 97116 GAIT TRAINING THERAPY: CPT

## 2021-06-01 PROCEDURE — 97140 MANUAL THERAPY 1/> REGIONS: CPT

## 2021-06-01 PROCEDURE — 97110 THERAPEUTIC EXERCISES: CPT

## 2021-06-01 NOTE — PROGRESS NOTES
Daily Note     Today's date: 2021  Patient name: El Trivedi  : 1980  MRN: [id number]  Referring provider: Yuli Mendieta DPM  Dx:   Encounter Diagnosis     ICD-10-CM    1  Pain in right toe(s)  M79 674        Start Time: 1630  Stop Time: 1718  Total time in clinic (min): 48 minutes    Subjective: Patient stated her toe feels really well today  Objective: See treatment diary below      Assessment: Patient tolerated session well  This session we progressed toe strengthening w/ minimal difficulty but required some cueing and UE support to prevent LOB  Improved mobility in toe noted, especially w/ toe extension  Plan: Continue per plan of care        Precautions: none      Manual    Scar massage 10 10' 10          Jt mobs 5  5          MFR    8'     8'                 Neuro Re-Ed             Stance on foam  Rebounder SLS 2# 30x           Stability disc   5     5' 5'    Foam balance beam SL 5'  x10          Standing on toes/heels x20 30x x30 SL x10    20x 20x 20x   Side stepping         5 laps RTB 3 laps   Bosu lunges  15x ea        15x ea                Ther Ex             [place]              Towel scrunch             Arom             Prom 5' 5' 10 10'    10' 10' 5'   Manual resistance PREs             HEP             bike        10' 10' lvl 2 10' lvl 2   Step ups w/ toe raise    15x         Calf stretches     Calf board 30" 3x      30" 3x   Pro stretch             Ther Activity             Walking on toes // bars  2 laps  On toes/heels 3 laps    1 laps 2 laps 2 laps   TRX squats  20x           Gait Training             gym    Step w/ toe raise 2 laps         Tm  10' 10' 10'    10' 10' 10'   Modalities

## 2021-06-03 ENCOUNTER — OFFICE VISIT (OUTPATIENT)
Dept: PHYSICAL THERAPY | Facility: CLINIC | Age: 41
End: 2021-06-03
Payer: COMMERCIAL

## 2021-06-03 DIAGNOSIS — M79.674 PAIN IN RIGHT TOE(S): Primary | ICD-10-CM

## 2021-06-03 PROCEDURE — 97110 THERAPEUTIC EXERCISES: CPT

## 2021-06-03 PROCEDURE — 97112 NEUROMUSCULAR REEDUCATION: CPT

## 2021-06-03 PROCEDURE — 97140 MANUAL THERAPY 1/> REGIONS: CPT

## 2021-06-03 NOTE — PROGRESS NOTES
Daily Note     Today's date: 6/3/2021  Patient name: Dmitri Muse  : 1980  MRN: [id number]  Referring provider: Mickie Townsend DPM  Dx:   Encounter Diagnosis     ICD-10-CM    1  Pain in right toe(s)  M79 674        Start Time: 1415  Stop Time: 1505  Total time in clinic (min): 50 minutes    Subjective: Patient reports no new complaints upon arrival to therapy  Objective: See treatment diary below      Assessment: Patient tolerated session well  Continued to focus on stability and on toe activities  Patient tolerated progression of exercises  Reduced tightness in toe  Patient would benefit from continued skilled PT  Plan: Continue per plan of care        Precautions: none      Manual 5/20 5/25 5/27 6/1 6/3   5/6 5/12 5/17   Scar massage 10 10' 10          Jt mobs 5  5          MFR    8' 8'    8'                 Neuro Re-Ed             Stance on foam  Rebounder SLS 2# 30x   30" 4x        Stability disc   5     5' 5'    Foam balance beam SL 5'  x10          Standing on toes/heels x20 30x x30 SL x10 3x10   20x 20x 20x   Side stepping         5 laps RTB 3 laps   Bosu lunges  15x ea   20x 5s     15x ea                Ther Ex             [place]              Towel scrunch             Arom             Prom 5' 5' 10 10' 5'   10' 10' 5'   Manual resistance PREs             HEP             bike        10' 10' lvl 2 10' lvl 2   Step ups w/ toe raise    15x 3x10        Calf stretches     Calf board 30" 3x Calf board 30" 3x     30" 3x   Pro stretch             Ther Activity             Walking on toes // bars  2 laps  On toes/heels 3 laps 4 laps   1 laps 2 laps 2 laps   TRX squats  20x           Gait Training             gym    Step w/ toe raise 2 laps 2 laps        Tm  10' 10' 10' 10' 4% incline   10' 10' 10'   Modalities

## 2021-06-08 ENCOUNTER — OFFICE VISIT (OUTPATIENT)
Dept: PHYSICAL THERAPY | Facility: CLINIC | Age: 41
End: 2021-06-08
Payer: COMMERCIAL

## 2021-06-08 DIAGNOSIS — M79.674 PAIN IN RIGHT TOE(S): Primary | ICD-10-CM

## 2021-06-08 PROCEDURE — 97110 THERAPEUTIC EXERCISES: CPT

## 2021-06-08 PROCEDURE — 97530 THERAPEUTIC ACTIVITIES: CPT

## 2021-06-08 NOTE — PROGRESS NOTES
Daily Note     Today's date: 2021  Patient name: Nelly Encinas  : 1980  MRN: [id number]  Referring provider: Jenn Colvin DPM  Dx:   Encounter Diagnosis     ICD-10-CM    1  Pain in right toe(s)  M79 674        Start Time: 1500  Stop Time: 1545  Total time in clinic (min): 45 minutes    Subjective: Patient reports she is having a fibro flare due to the weather  Patient reports difficulty lifting and pushing objects  Objective: See treatment diary below      Assessment: Patient tolerated session well  Progressed POC to include lifting, carrying, and pushing weight  Improved ambulation noted and no increased pain w/ exercises  Patient required cueing for proper posture and technique t/o session  Patient would benefit from continued skilled PT  Plan: Continue per plan of care        Precautions: none      Manual 5/20 5/25 5/27 6/1 6/3 6/8  5/6 5/12 5/17   Scar massage 10 10' 10          Jt mobs 5  5          MFR    8' 8'    8'                 Neuro Re-Ed             Stance on foam  Rebounder SLS 2# 30x   30" 4x        Stability disc   5     5' 5'    Foam balance beam SL 5'  x10          Standing on toes/heels x20 30x x30 SL x10 3x10   20x 20x 20x   Side stepping         5 laps RTB 3 laps   Bosu lunges  15x ea   20x 5s     15x ea                Ther Ex             [place]              Towel scrunch             Arom             Prom 5' 5' 10 10' 5'   10' 10' 5'   Manual resistance PREs             HEP             bike      10'  10' 10' lvl 2 10' lvl 2   Step ups w/ toe raise    15x 3x10        Calf stretches     Calf board 30" 3x Calf board 30" 3x     30" 3x   Pro stretch             Ther Activity             Walking on toes // bars  2 laps  On toes/heels 3 laps 4 laps   1 laps 2 laps 2 laps   Deadlift w/ and w/o weigth      10# 15x       [place] carry      5# 3 laps       Walkout [place]       2# 10x                                 TRX squats  20x           Gait Training             gym Step w/ toe raise 2 laps 2 laps        Tm  10' 10' 10' 10' 4% incline   10' 10' 10'   Modalities

## 2021-06-09 DIAGNOSIS — J30.1 SEASONAL ALLERGIC RHINITIS DUE TO POLLEN: ICD-10-CM

## 2021-06-09 RX ORDER — FLUTICASONE PROPIONATE 50 MCG
SPRAY, SUSPENSION (ML) NASAL
Qty: 16 ML | Refills: 0 | Status: SHIPPED | OUTPATIENT
Start: 2021-06-09 | End: 2021-07-03

## 2021-06-10 ENCOUNTER — OFFICE VISIT (OUTPATIENT)
Dept: PHYSICAL THERAPY | Facility: CLINIC | Age: 41
End: 2021-06-10
Payer: COMMERCIAL

## 2021-06-10 DIAGNOSIS — M79.674 PAIN IN RIGHT TOE(S): Primary | ICD-10-CM

## 2021-06-10 PROCEDURE — 97530 THERAPEUTIC ACTIVITIES: CPT

## 2021-06-10 PROCEDURE — 97110 THERAPEUTIC EXERCISES: CPT

## 2021-06-10 PROCEDURE — 97140 MANUAL THERAPY 1/> REGIONS: CPT

## 2021-06-10 NOTE — PROGRESS NOTES
Daily Note     Today's date: 6/10/2021  Patient name: Diana Lowe  : 1980  MRN: [id number]  Referring provider: Melodie Montero DPM  Dx:   Encounter Diagnosis     ICD-10-CM    1  Pain in right toe(s)  M79 674        Start Time: 1415  Stop Time: 1505  Total time in clinic (min): 50 minutes    Subjective: Patient reports her toe is feeling a little sore today  Objective: See treatment diary below      Assessment: Patient tolerated session well  Good heel toe ambulation t/o session  Increased weight w/ walk outs w/o difficulty  Reduced muscular tightness in forefoot post MFR  Patient would benefit from continued skilled PT  Plan: Continue per plan of care        Precautions: none      Manual 5/20 5/25 5/27 6/1 6/3  6/8      Scar massage 10 10' 10          Jt mobs 5  5          MFR    8' 8'  8'                   Neuro Re-Ed             Stance on foam  Rebounder SLS 2# 30x   30" 4x        Stability disc   5          Foam balance beam SL 5'  x10          Standing on toes/heels x20 30x x30 SL x10 3x10  3x10      Side stepping             Bosu lunges  15x ea   20x 5s  20x 5s                   Ther Ex             [place]              Towel scrunch             Arom             Prom 5' 5' 10 10' 5'  5'      Manual resistance PREs             HEP             bike      10' TM 10'      Step ups w/ toe raise    15x 3x10        Calf stretches     Calf board 30" 3x Calf board 30" 3x  Calf board 30" 3x      Pro stretch             Ther Activity             Walking on toes // bars  2 laps  On toes/heels 3 laps 4 laps        Deadlift w/ and w/o weigth      10# 15x       [place] carry      5# 3 laps 5# 5 laps      Walkout [place]       2# 10x 5# 10x                                TRX squats  20x           Gait Training             gym    Step w/ toe raise 2 laps 2 laps        Tm  10' 10' 10' 10' 4% incline        Modalities

## 2021-06-15 ENCOUNTER — OFFICE VISIT (OUTPATIENT)
Dept: PHYSICAL THERAPY | Facility: CLINIC | Age: 41
End: 2021-06-15
Payer: COMMERCIAL

## 2021-06-15 DIAGNOSIS — M79.674 PAIN IN RIGHT TOE(S): Primary | ICD-10-CM

## 2021-06-15 PROCEDURE — 97112 NEUROMUSCULAR REEDUCATION: CPT

## 2021-06-15 PROCEDURE — 97530 THERAPEUTIC ACTIVITIES: CPT

## 2021-06-15 PROCEDURE — 97110 THERAPEUTIC EXERCISES: CPT

## 2021-06-15 NOTE — PROGRESS NOTES
Daily Note     Today's date: 6/15/2021  Patient name: Nelly Encinas  : 1980  MRN: [id number]  Referring provider: eJnn Colvin DPM  Dx:   Encounter Diagnosis     ICD-10-CM    1  Pain in right toe(s)  M79 674        Start Time: 1545  Stop Time: 1630  Total time in clinic (min): 45 minutes    Subjective: Patient reported her toe is feeling good today  Lifting and carrying objects is getting easier  Objective: See treatment diary below      Assessment: Patient tolerated session well  Improve ambulation noted upon arrival  Progressed stability exercises w/ fatigue noted  Cueing for proper technique t/o exercises  Good balance t/o  No increased pain  Patient would benefit from continued skilled PT  Plan: Continue per plan of care        Precautions: none      Manual 5/20 5/25 5/27 6/1 6/3  6/8 6/15     Scar massage 10 10' 10          Jt mobs 5  5          MFR    8' 8'  8'                   Neuro Re-Ed             Stance on foam  Rebounder SLS 2# 30x   30" 4x        Stability disc   5          Foam balance beam SL 5'  x10          Standing on toes/heels x20 30x x30 SL x10 3x10  3x10 2x10 2 up; 1 down eccentric focus     Side stepping        W/ YTB around feet 2 laps 20'     Bosu lunges  15x ea   20x 5s  20x 5s      Walking marches w/ YTB around feet        10' 1 lap     Monster walk w/ YTB around feet         10' 1 laps     Ther Ex             [place]              Towel scrunch             Arom             Prom 5' 5' 10 10' 5'  5'      Manual resistance PREs             HEP             bike      10' TM 10' TM 10'     Step ups w/ toe raise    15x 3x10        Calf stretches     Calf board 30" 3x Calf board 30" 3x  Calf board 30" 3x Calf board 30"   3x     Pro stretch             Ther Activity             Walking on toes // bars  2 laps  On toes/heels 3 laps 4 laps        Deadlift w/ and w/o weigth      10# 15x       [place] carry      5# 3 laps 5# 5 laps 5# 6 laps     Walkout [place]       2# 10x 5# 10x 5# 10x ea fwd/back                               TRX squats  20x           Gait Training             gym    Step w/ toe raise 2 laps 2 laps        Tm  10' 10' 10' 10' 4% incline        Modalities

## 2021-06-18 ENCOUNTER — OFFICE VISIT (OUTPATIENT)
Dept: PHYSICAL THERAPY | Facility: CLINIC | Age: 41
End: 2021-06-18
Payer: COMMERCIAL

## 2021-06-18 DIAGNOSIS — M79.674 PAIN IN RIGHT TOE(S): Primary | ICD-10-CM

## 2021-06-18 PROCEDURE — 97110 THERAPEUTIC EXERCISES: CPT

## 2021-06-18 PROCEDURE — 97112 NEUROMUSCULAR REEDUCATION: CPT

## 2021-06-18 PROCEDURE — 97530 THERAPEUTIC ACTIVITIES: CPT

## 2021-06-18 NOTE — PROGRESS NOTES
Daily Note     Today's date: 2021  Patient name: Héctor Champion  : 1980  MRN: [id number]  Referring provider: Milena Blanco DPM  Dx:   Encounter Diagnosis     ICD-10-CM    1  Pain in right toe(s)  M79 674        Start Time: 1055  Stop Time: 1135  Total time in clinic (min): 40 minutes    Subjective: Patient reports she hasn't been working the toe at home the last couple days  Stated her toe feels pretty good  Objective: See treatment diary below      Assessment: Tolerated treatment well  Patient demonstrated fatigue post treatment and would benefit from continued skilled PT  Patient did well w/ increased weight w/ farmers carry  Patient was challenged w/ single leg STS w/ cueing for posture  Plan: Continue per plan of care        Precautions: none      Manual 5/20 5/25 5/27 6/1 6/3  6/8 6/15 6/18    Scar massage 10 10' 10          Jt mobs 5  5          MFR    8' 8'  8'                   Neuro Re-Ed             Stance on foam  Rebounder SLS 2# 30x   30" 4x        Stability disc   5      W/ TRX squats 30x    Foam balance beam SL 5'  x10          Standing on toes/heels x20 30x x30 SL x10 3x10  3x10 2x10 2 up; 1 down eccentric focus 3x10 2 up; 1 down eccentric focus    Side stepping        W/ YTB around feet 2 laps 20'     Bosu lunges  15x ea   20x 5s  20x 5s  5s 20x    Walking marches w/ YTB around feet        10' 1 lap     Monster walk w/ YTB around feet         10' 1 laps     Ther Ex             [place]              Towel scrunch             Arom             Prom 5' 5' 10 10' 5'  5'      Manual resistance PREs             HEP             bike      10' TM 10' TM 10' TM 10'    Step ups w/ toe raise    15x 3x10        Calf stretches     Calf board 30" 3x Calf board 30" 3x  Calf board 30" 3x Calf board 30"   3x Calf board 30"   3x    Pro stretch             Ther Activity             Walking on toes // bars  2 laps  On toes/heels 3 laps 4 laps        Deadlift w/ and w/o weigth      10# 15x       [place] carry      5# 3 laps 5# 5 laps 5# 6 laps 10# 3 laps    Walkout [place]       2# 10x 5# 10x 5# 10x ea fwd/back 7# 10x    SL STS         x10                 TRX squats  20x           Gait Training             gym    Step w/ toe raise 2 laps 2 laps        Tm  10' 10' 10' 10' 4% incline        Modalities

## 2021-06-22 ENCOUNTER — EVALUATION (OUTPATIENT)
Dept: PHYSICAL THERAPY | Facility: CLINIC | Age: 41
End: 2021-06-22
Payer: COMMERCIAL

## 2021-06-22 DIAGNOSIS — M79.674 PAIN IN RIGHT TOE(S): Primary | ICD-10-CM

## 2021-06-22 PROCEDURE — 97112 NEUROMUSCULAR REEDUCATION: CPT | Performed by: PHYSICAL THERAPIST

## 2021-06-22 PROCEDURE — 97140 MANUAL THERAPY 1/> REGIONS: CPT | Performed by: PHYSICAL THERAPIST

## 2021-06-22 NOTE — PROGRESS NOTES
Discharge    Today's date: 2021  Patient name: Sasha Salazar  : 1980  MRN: [id number]  Referring provider: Mary Anne Sherman DPM  Dx:   Encounter Diagnosis     ICD-10-CM    1   Pain in right toe(s)  M79 674        Start Time: 1545  Stop Time: 1630  Total time in clinic (min): 45 minutes    Subjective: patient reports R foot feeling pretty good      Objective: See treatment diary below      Assessment: patient able to tolerate rising and lowering through arches at ballet bar; no increase in pain reported       Plan: D/C; patient to continue exercises at ballet bar at home     Precautions: none      Manual 5/20 5/25 5/27 6/1 6/3  6/8 6/15 6/18 6/22   Scar massage 10 10' 10       2   Jt mobs 5  5       8   MFR    8' 8'  8'                   Neuro Re-Ed             Stance on foam  Rebounder SLS 2# 30x   30" 4x        Stability disc   5      W/ TRX squats 30x    Foam balance beam SL 5'  x10          Standing on toes/heels x20 30x x30 SL x10 3x10  3x10 2x10 2 up; 1 down eccentric focus 3x10 2 up; 1 down eccentric focus Rising/lowering through arches at ballet bar   Side stepping        W/ YTB around feet 2 laps 20'     Bosu lunges  15x ea   20x 5s  20x 5s  5s 20x 5s 20x   Walking marches w/ YTB around feet        10' 1 lap     Monster walk w/ YTB around feet         10' 1 laps     Ther Ex             [place]              Towel scrunch             Arom             Prom 5' 5' 10 10' 5'  5'      Manual resistance PREs             HEP             bike      10' TM 10' TM 10' TM 10' 10'   Step ups w/ toe raise    15x 3x10        Calf stretches     Calf board 30" 3x Calf board 30" 3x  Calf board 30" 3x Calf board 30"   3x Calf board 30"   3x 30" 3x   Pro stretch             Ther Activity             Walking on toes // bars  2 laps  On toes/heels 3 laps 4 laps        Deadlift w/ and w/o weigth      10# 15x       [place] carry      5# 3 laps 5# 5 laps 5# 6 laps 10# 3 laps    Walkout [place]       2# 10x 5# 10x 5# 10x ea fwd/back 7# 10x    SL STS         x10                 TRX squats  20x           Gait Training             gym    Step w/ toe raise 2 laps 2 laps        Tm  10' 10' 10' 10' 4% incline        Modalities

## 2021-07-03 DIAGNOSIS — J30.1 SEASONAL ALLERGIC RHINITIS DUE TO POLLEN: ICD-10-CM

## 2021-07-03 RX ORDER — FLUTICASONE PROPIONATE 50 MCG
SPRAY, SUSPENSION (ML) NASAL
Qty: 16 ML | Refills: 0 | Status: SHIPPED | OUTPATIENT
Start: 2021-07-03 | End: 2021-09-07

## 2021-09-01 ENCOUNTER — APPOINTMENT (OUTPATIENT)
Dept: LAB | Facility: CLINIC | Age: 41
End: 2021-09-01
Payer: COMMERCIAL

## 2021-09-01 DIAGNOSIS — R73.01 IMPAIRED FASTING GLUCOSE: ICD-10-CM

## 2021-09-01 DIAGNOSIS — E78.5 HYPERLIPIDEMIA, UNSPECIFIED HYPERLIPIDEMIA TYPE: ICD-10-CM

## 2021-09-01 DIAGNOSIS — Z11.4 SCREENING FOR HIV (HUMAN IMMUNODEFICIENCY VIRUS): ICD-10-CM

## 2021-09-01 DIAGNOSIS — Z00.00 WELL ADULT EXAM: ICD-10-CM

## 2021-09-01 LAB
ALBUMIN SERPL BCP-MCNC: 4.2 G/DL (ref 3.5–5)
ALP SERPL-CCNC: 78 U/L (ref 46–116)
ALT SERPL W P-5'-P-CCNC: 26 U/L (ref 12–78)
ANION GAP SERPL CALCULATED.3IONS-SCNC: 6 MMOL/L (ref 4–13)
AST SERPL W P-5'-P-CCNC: 13 U/L (ref 5–45)
BASOPHILS # BLD AUTO: 0.02 THOUSANDS/ΜL (ref 0–0.1)
BASOPHILS NFR BLD AUTO: 0 % (ref 0–1)
BILIRUB SERPL-MCNC: 0.45 MG/DL (ref 0.2–1)
BUN SERPL-MCNC: 17 MG/DL (ref 5–25)
CALCIUM SERPL-MCNC: 9.3 MG/DL (ref 8.3–10.1)
CHLORIDE SERPL-SCNC: 107 MMOL/L (ref 100–108)
CHOLEST SERPL-MCNC: 190 MG/DL (ref 50–200)
CO2 SERPL-SCNC: 24 MMOL/L (ref 21–32)
CREAT SERPL-MCNC: 0.88 MG/DL (ref 0.6–1.3)
EOSINOPHIL # BLD AUTO: 0.02 THOUSAND/ΜL (ref 0–0.61)
EOSINOPHIL NFR BLD AUTO: 0 % (ref 0–6)
ERYTHROCYTE [DISTWIDTH] IN BLOOD BY AUTOMATED COUNT: 13.3 % (ref 11.6–15.1)
EST. AVERAGE GLUCOSE BLD GHB EST-MCNC: 105 MG/DL
GFR SERPL CREATININE-BSD FRML MDRD: 96 ML/MIN/1.73SQ M
GLUCOSE P FAST SERPL-MCNC: 79 MG/DL (ref 65–99)
HBA1C MFR BLD: 5.3 %
HCT VFR BLD AUTO: 44.9 % (ref 34.8–46.1)
HDLC SERPL-MCNC: 55 MG/DL
HGB BLD-MCNC: 14 G/DL (ref 11.5–15.4)
IMM GRANULOCYTES # BLD AUTO: 0.02 THOUSAND/UL (ref 0–0.2)
IMM GRANULOCYTES NFR BLD AUTO: 0 % (ref 0–2)
LDLC SERPL CALC-MCNC: 121 MG/DL (ref 0–100)
LYMPHOCYTES # BLD AUTO: 2.1 THOUSANDS/ΜL (ref 0.6–4.47)
LYMPHOCYTES NFR BLD AUTO: 34 % (ref 14–44)
MCH RBC QN AUTO: 27.6 PG (ref 26.8–34.3)
MCHC RBC AUTO-ENTMCNC: 31.2 G/DL (ref 31.4–37.4)
MCV RBC AUTO: 89 FL (ref 82–98)
MONOCYTES # BLD AUTO: 0.44 THOUSAND/ΜL (ref 0.17–1.22)
MONOCYTES NFR BLD AUTO: 7 % (ref 4–12)
NEUTROPHILS # BLD AUTO: 3.54 THOUSANDS/ΜL (ref 1.85–7.62)
NEUTS SEG NFR BLD AUTO: 59 % (ref 43–75)
NONHDLC SERPL-MCNC: 135 MG/DL
NRBC BLD AUTO-RTO: 0 /100 WBCS
PLATELET # BLD AUTO: 286 THOUSANDS/UL (ref 149–390)
PMV BLD AUTO: 11.3 FL (ref 8.9–12.7)
POTASSIUM SERPL-SCNC: 3.4 MMOL/L (ref 3.5–5.3)
PROT SERPL-MCNC: 8.5 G/DL (ref 6.4–8.2)
RBC # BLD AUTO: 5.07 MILLION/UL (ref 3.81–5.12)
SODIUM SERPL-SCNC: 137 MMOL/L (ref 136–145)
TRIGL SERPL-MCNC: 68 MG/DL
TSH SERPL DL<=0.05 MIU/L-ACNC: 0.99 UIU/ML (ref 0.36–3.74)
WBC # BLD AUTO: 6.14 THOUSAND/UL (ref 4.31–10.16)

## 2021-09-01 PROCEDURE — 80053 COMPREHEN METABOLIC PANEL: CPT

## 2021-09-01 PROCEDURE — 80061 LIPID PANEL: CPT

## 2021-09-01 PROCEDURE — 87389 HIV-1 AG W/HIV-1&-2 AB AG IA: CPT

## 2021-09-01 PROCEDURE — 83036 HEMOGLOBIN GLYCOSYLATED A1C: CPT

## 2021-09-01 PROCEDURE — 84443 ASSAY THYROID STIM HORMONE: CPT

## 2021-09-01 PROCEDURE — 36415 COLL VENOUS BLD VENIPUNCTURE: CPT

## 2021-09-01 PROCEDURE — 85025 COMPLETE CBC W/AUTO DIFF WBC: CPT

## 2021-09-03 LAB — HIV 1+2 AB+HIV1 P24 AG SERPL QL IA: NORMAL

## 2021-09-07 DIAGNOSIS — J30.1 SEASONAL ALLERGIC RHINITIS DUE TO POLLEN: ICD-10-CM

## 2021-09-07 RX ORDER — FLUTICASONE PROPIONATE 50 MCG
SPRAY, SUSPENSION (ML) NASAL
Qty: 16 ML | Refills: 0 | Status: SHIPPED | OUTPATIENT
Start: 2021-09-07 | End: 2021-10-03

## 2021-09-21 ENCOUNTER — APPOINTMENT (OUTPATIENT)
Dept: LAB | Facility: CLINIC | Age: 41
End: 2021-09-21
Payer: COMMERCIAL

## 2021-09-21 ENCOUNTER — OFFICE VISIT (OUTPATIENT)
Dept: INTERNAL MEDICINE CLINIC | Facility: CLINIC | Age: 41
End: 2021-09-21
Payer: COMMERCIAL

## 2021-09-21 VITALS
HEART RATE: 79 BPM | WEIGHT: 153 LBS | BODY MASS INDEX: 26.12 KG/M2 | SYSTOLIC BLOOD PRESSURE: 100 MMHG | TEMPERATURE: 98.5 F | DIASTOLIC BLOOD PRESSURE: 70 MMHG | HEIGHT: 64 IN | RESPIRATION RATE: 12 BRPM | OXYGEN SATURATION: 99 %

## 2021-09-21 DIAGNOSIS — K58.1 IRRITABLE BOWEL SYNDROME WITH CONSTIPATION: ICD-10-CM

## 2021-09-21 DIAGNOSIS — E78.5 HYPERLIPIDEMIA, UNSPECIFIED HYPERLIPIDEMIA TYPE: ICD-10-CM

## 2021-09-21 DIAGNOSIS — R73.01 IMPAIRED FASTING GLUCOSE: ICD-10-CM

## 2021-09-21 DIAGNOSIS — N39.41 URGE INCONTINENCE OF URINE: ICD-10-CM

## 2021-09-21 DIAGNOSIS — N39.41 URGE INCONTINENCE OF URINE: Primary | ICD-10-CM

## 2021-09-21 PROCEDURE — 99395 PREV VISIT EST AGE 18-39: CPT | Performed by: INTERNAL MEDICINE

## 2021-09-21 PROCEDURE — 81003 URINALYSIS AUTO W/O SCOPE: CPT | Performed by: INTERNAL MEDICINE

## 2021-09-21 NOTE — PROGRESS NOTES
Assessment/Plan:    Kegel Exercises for Women      AMBULATORY CARE:   Kegel exercises  help strengthen your pelvic muscles  Pelvic muscles hold your pelvic organs, such as your bladder and uterus, in place  Kegel exercises help prevent or control problems with urine incontinence (leakage)  Incontinence may be caused by pregnancy, childbirth, or menopause  Contact your healthcare provider if:   · You cannot feel your muscles tighten or relax  · You continue to leak urine  · You have questions or concerns about your condition or care  Use the correct muscles:  Pelvic muscles are the muscles you use to control urine flow  To target these muscles, stop and start the flow of urine several times  This will help you become familiar with how it feels to tighten and relax these muscles  How to do Kegel exercises:   · Empty your bladder  You may lie down, stand up, or sit down to do these exercises  When you first try to do these exercises, it may be easier if you lie down  Tighten or squeeze your pelvic muscles slowly  It may feel like you are trying to hold back urine or gas  Hold this position for 3 seconds  Relax for 3 seconds  Repeat this cycle 10 times  · Do 10 sets of Kegel exercises, at least 3 times a day  Do not hold your breath when you do Kegel exercises  Keep your stomach, back, and leg muscles relaxed  · As your muscles get stronger, you will be able to hold the squeeze longer  Your healthcare provider may ask that you increase your pelvic muscle squeeze to 10 seconds  After you squeeze for 10 seconds, relax for 10 seconds  What else you should know:   · Once you know how to do Kegel exercises, use different positions  You can do these exercises while you lie on the floor, sit at your desk or watch TV, and while you stand  · You may notice improved bladder control within about 6 weeks       · Tighten your pelvic muscles before you sneeze, cough, or lift to prevent urine leakage  Follow up with your healthcare provider as directed:  Write down your questions so you remember to ask them during your visits  © Copyright [employer] 2021 Information is for End User's use only and may not be sold, redistributed or otherwise used for commercial purposes  All illustrations and images included in CareNotes® are the copyrighted property of A D A M , Inc  or Alicja Sneed   The above information is an  only  It is not intended as medical advice for individual conditions or treatments  Talk to your doctor, nurse or pharmacist before following any medical regimen to see if it is safe and effective for you  Impaired fasting glucose  A1c is very well controlled  Continue your exercise    Extrinsic asthma  Continue Albuterol PRN  No reported exacerbations    Anxiety state  Continue PSYCHOTHERAPY    Hyperlipidemia  Continue your exercise and watch your diet    Urge incontinence of urine  Will get U/A  Advised Kegel exercise    Allergic rhinitis  Continue Flonase    Irritable bowel syndrome with constipation  Advised Metamucil        Diagnoses and all orders for this visit:    Urge incontinence of urine  -     UA (URINE) with reflex to Scope    Irritable bowel syndrome with constipation    Hyperlipidemia, unspecified hyperlipidemia type    Impaired fasting glucose          Subjective:      Patient ID: Jaydon Serrano is a 45 y o  female  HPI   This is a 73-year-old female with history of fibromyalgia, extrinsic asthma, dyslipidemia who is here in the clinic for her annual visit  She reports that she has been having a nagging sensation in her finger, more pronounced when she appears ear plugs  Patient does have lot of allergies, he is using anticholinergics and Flonase nasal spray   She got surgery for bunion bursitis and reports she is not taking very much physically active due to muscle aches and imbalance    Patient also reports that occasionally she notices swelling over her maxillary region over left side  Patient also has history of IBS, constipating alternating with diarrhea but reports more often she is constipated  Patient also reports of swelling and pain in her distal interphalangeal joint of the little finger of the left hand which is very intermittent and resolved on its own    The following portions of the patient's history were reviewed and updated as appropriate: allergies, current medications, past family history, past medical history, past social history, past surgical history and problem list     Review of Systems   Constitutional: Negative for chills and fever  HENT: Positive for tinnitus  Negative for ear pain and sore throat  Eyes: Negative for pain and visual disturbance  Respiratory: Negative for cough and shortness of breath  Cardiovascular: Negative for chest pain and palpitations  Gastrointestinal: Negative for abdominal pain and vomiting  Genitourinary: Negative for dysuria and hematuria  Musculoskeletal: Negative for arthralgias and back pain  Skin: Negative for color change and rash  Neurological: Negative for seizures and syncope  All other systems reviewed and are negative  Objective:      /70 (BP Location: Left arm, Patient Position: Sitting, Cuff Size: Standard)   Pulse 79   Temp 98 5 °F (36 9 °C) (Tympanic)   Resp 12   Ht 5' 4" (1 626 m)   Wt 69 4 kg (153 lb)   SpO2 99%   BMI 26 26 kg/m²          Physical Exam  HENT:      Head: Normocephalic and atraumatic  Right Ear: Tympanic membrane normal       Left Ear: Tympanic membrane normal       Nose: Congestion present  Cardiovascular:      Rate and Rhythm: Normal rate  Pulses: Normal pulses  Heart sounds: Normal heart sounds  Pulmonary:      Effort: Pulmonary effort is normal       Breath sounds: Normal breath sounds  Abdominal:      General: Bowel sounds are normal  There is no distension  Palpations: Abdomen is soft  Tenderness: There is no abdominal tenderness  Musculoskeletal:         General: Normal range of motion  Right lower leg: No edema  Left lower leg: No edema  Skin:     General: Skin is warm  Capillary Refill: Capillary refill takes less than 2 seconds  Neurological:      General: No focal deficit present  Mental Status: She is oriented to person, place, and time     Psychiatric:         Mood and Affect: Mood normal          Behavior: Behavior normal

## 2021-09-21 NOTE — PATIENT INSTRUCTIONS
Kegel Exercises for Women   AMBULATORY CARE:   Kegel exercises  help strengthen your pelvic muscles  Pelvic muscles hold your pelvic organs, such as your bladder and uterus, in place  Kegel exercises help prevent or control problems with urine incontinence (leakage)  Incontinence may be caused by pregnancy, childbirth, or menopause  Contact your healthcare provider if:   · You cannot feel your muscles tighten or relax  · You continue to leak urine  · You have questions or concerns about your condition or care  Use the correct muscles:  Pelvic muscles are the muscles you use to control urine flow  To target these muscles, stop and start the flow of urine several times  This will help you become familiar with how it feels to tighten and relax these muscles  How to do Kegel exercises:   · Empty your bladder  You may lie down, stand up, or sit down to do these exercises  When you first try to do these exercises, it may be easier if you lie down  Tighten or squeeze your pelvic muscles slowly  It may feel like you are trying to hold back urine or gas  Hold this position for 3 seconds  Relax for 3 seconds  Repeat this cycle 10 times  · Do 10 sets of Kegel exercises, at least 3 times a day  Do not hold your breath when you do Kegel exercises  Keep your stomach, back, and leg muscles relaxed  · As your muscles get stronger, you will be able to hold the squeeze longer  Your healthcare provider may ask that you increase your pelvic muscle squeeze to 10 seconds  After you squeeze for 10 seconds, relax for 10 seconds  What else you should know:   · Once you know how to do Kegel exercises, use different positions  You can do these exercises while you lie on the floor, sit at your desk or watch TV, and while you stand  · You may notice improved bladder control within about 6 weeks  · Tighten your pelvic muscles before you sneeze, cough, or lift to prevent urine leakage      Follow up with your healthcare provider as directed:  Write down your questions so you remember to ask them during your visits  © Copyright [employer] 2021 Information is for End User's use only and may not be sold, redistributed or otherwise used for commercial purposes  All illustrations and images included in CareNotes® are the copyrighted property of [employer]  or Alicja Galaviz  The above information is an  only  It is not intended as medical advice for individual conditions or treatments  Talk to your doctor, nurse or pharmacist before following any medical regimen to see if it is safe and effective for you

## 2021-09-22 LAB
BILIRUB UR QL STRIP: NEGATIVE
CLARITY UR: CLEAR
COLOR UR: YELLOW
GLUCOSE UR STRIP-MCNC: NEGATIVE MG/DL
HGB UR QL STRIP.AUTO: NEGATIVE
KETONES UR STRIP-MCNC: NEGATIVE MG/DL
LEUKOCYTE ESTERASE UR QL STRIP: NEGATIVE
NITRITE UR QL STRIP: NEGATIVE
PH UR STRIP.AUTO: 7.5 [PH]
PROT UR STRIP-MCNC: NEGATIVE MG/DL
SP GR UR STRIP.AUTO: 1.02 (ref 1–1.03)
UROBILINOGEN UR QL STRIP.AUTO: 0.2 E.U./DL

## 2021-10-02 DIAGNOSIS — J30.1 SEASONAL ALLERGIC RHINITIS DUE TO POLLEN: ICD-10-CM

## 2021-10-03 RX ORDER — FLUTICASONE PROPIONATE 50 MCG
SPRAY, SUSPENSION (ML) NASAL
Qty: 16 ML | Refills: 0 | Status: SHIPPED | OUTPATIENT
Start: 2021-10-03 | End: 2021-10-26

## 2021-10-26 DIAGNOSIS — J30.1 SEASONAL ALLERGIC RHINITIS DUE TO POLLEN: ICD-10-CM

## 2021-10-26 RX ORDER — FLUTICASONE PROPIONATE 50 MCG
SPRAY, SUSPENSION (ML) NASAL
Qty: 16 ML | Refills: 0 | Status: SHIPPED | OUTPATIENT
Start: 2021-10-26 | End: 2021-11-24

## 2021-11-15 ENCOUNTER — HOSPITAL ENCOUNTER (OUTPATIENT)
Dept: CT IMAGING | Facility: HOSPITAL | Age: 41
Discharge: HOME | End: 2021-11-15
Payer: COMMERCIAL

## 2021-11-15 ENCOUNTER — APPOINTMENT (OUTPATIENT)
Dept: LAB | Facility: CLINIC | Age: 41
End: 2021-11-15
Payer: COMMERCIAL

## 2021-11-15 ENCOUNTER — OFFICE VISIT (OUTPATIENT)
Dept: INTERNAL MEDICINE CLINIC | Facility: CLINIC | Age: 41
End: 2021-11-15
Payer: COMMERCIAL

## 2021-11-15 VITALS
HEIGHT: 64 IN | BODY MASS INDEX: 26.09 KG/M2 | OXYGEN SATURATION: 99 % | WEIGHT: 152.8 LBS | DIASTOLIC BLOOD PRESSURE: 78 MMHG | SYSTOLIC BLOOD PRESSURE: 106 MMHG | HEART RATE: 110 BPM

## 2021-11-15 DIAGNOSIS — R10.2 PELVIC PAIN: ICD-10-CM

## 2021-11-15 DIAGNOSIS — R10.31 RLQ ABDOMINAL PAIN: ICD-10-CM

## 2021-11-15 DIAGNOSIS — R10.2 PELVIC PAIN: Primary | ICD-10-CM

## 2021-11-15 LAB
ALBUMIN SERPL BCP-MCNC: 3.7 G/DL (ref 3.5–5)
ALP SERPL-CCNC: 70 U/L (ref 46–116)
ALT SERPL W P-5'-P-CCNC: 17 U/L (ref 12–78)
AMORPH PHOS CRY URNS QL MICRO: ABNORMAL /HPF
ANION GAP SERPL CALCULATED.3IONS-SCNC: 5 MMOL/L (ref 4–13)
AST SERPL W P-5'-P-CCNC: 7 U/L (ref 5–45)
BACTERIA UR QL AUTO: ABNORMAL /HPF
BASOPHILS # BLD AUTO: 0.03 THOUSANDS/ΜL (ref 0–0.1)
BASOPHILS NFR BLD AUTO: 1 % (ref 0–1)
BILIRUB SERPL-MCNC: 0.42 MG/DL (ref 0.2–1)
BILIRUB UR QL STRIP: NEGATIVE
BUN SERPL-MCNC: 12 MG/DL (ref 5–25)
CALCIUM SERPL-MCNC: 9.3 MG/DL (ref 8.3–10.1)
CHLORIDE SERPL-SCNC: 109 MMOL/L (ref 100–108)
CHOLEST SERPL-MCNC: 172 MG/DL (ref 50–200)
CLARITY UR: ABNORMAL
CO2 SERPL-SCNC: 23 MMOL/L (ref 21–32)
COLOR UR: ABNORMAL
CREAT SERPL-MCNC: 0.79 MG/DL (ref 0.6–1.3)
CRP SERPL QL: 8.2 MG/L
EOSINOPHIL # BLD AUTO: 0.03 THOUSAND/ΜL (ref 0–0.61)
EOSINOPHIL NFR BLD AUTO: 1 % (ref 0–6)
ERYTHROCYTE [DISTWIDTH] IN BLOOD BY AUTOMATED COUNT: 13 % (ref 11.6–15.1)
ERYTHROCYTE [SEDIMENTATION RATE] IN BLOOD: 47 MM/HOUR (ref 0–19)
EST. AVERAGE GLUCOSE BLD GHB EST-MCNC: 114 MG/DL
GFR SERPL CREATININE-BSD FRML MDRD: 108 ML/MIN/1.73SQ M
GLUCOSE P FAST SERPL-MCNC: 86 MG/DL (ref 65–99)
GLUCOSE UR STRIP-MCNC: NEGATIVE MG/DL
HBA1C MFR BLD: 5.6 %
HCT VFR BLD AUTO: 41.5 % (ref 34.8–46.1)
HDLC SERPL-MCNC: 41 MG/DL
HGB BLD-MCNC: 12.8 G/DL (ref 11.5–15.4)
HGB UR QL STRIP.AUTO: ABNORMAL
IMM GRANULOCYTES # BLD AUTO: 0.01 THOUSAND/UL (ref 0–0.2)
IMM GRANULOCYTES NFR BLD AUTO: 0 % (ref 0–2)
KETONES UR STRIP-MCNC: NEGATIVE MG/DL
LDLC SERPL CALC-MCNC: 111 MG/DL (ref 0–100)
LEUKOCYTE ESTERASE UR QL STRIP: ABNORMAL
LYMPHOCYTES # BLD AUTO: 1.72 THOUSANDS/ΜL (ref 0.6–4.47)
LYMPHOCYTES NFR BLD AUTO: 27 % (ref 14–44)
MCH RBC QN AUTO: 26.8 PG (ref 26.8–34.3)
MCHC RBC AUTO-ENTMCNC: 30.8 G/DL (ref 31.4–37.4)
MCV RBC AUTO: 87 FL (ref 82–98)
MONOCYTES # BLD AUTO: 0.46 THOUSAND/ΜL (ref 0.17–1.22)
MONOCYTES NFR BLD AUTO: 7 % (ref 4–12)
MUCOUS THREADS UR QL AUTO: ABNORMAL
NEUTROPHILS # BLD AUTO: 4.08 THOUSANDS/ΜL (ref 1.85–7.62)
NEUTS SEG NFR BLD AUTO: 64 % (ref 43–75)
NITRITE UR QL STRIP: NEGATIVE
NON-SQ EPI CELLS URNS QL MICRO: ABNORMAL /HPF
NONHDLC SERPL-MCNC: 131 MG/DL
NRBC BLD AUTO-RTO: 0 /100 WBCS
PH UR STRIP.AUTO: 8 [PH]
PLATELET # BLD AUTO: 419 THOUSANDS/UL (ref 149–390)
PMV BLD AUTO: 10.6 FL (ref 8.9–12.7)
POTASSIUM SERPL-SCNC: 3.9 MMOL/L (ref 3.5–5.3)
PROT SERPL-MCNC: 8 G/DL (ref 6.4–8.2)
PROT UR STRIP-MCNC: ABNORMAL MG/DL
RBC # BLD AUTO: 4.78 MILLION/UL (ref 3.81–5.12)
RBC #/AREA URNS AUTO: ABNORMAL /HPF
SODIUM SERPL-SCNC: 137 MMOL/L (ref 136–145)
SP GR UR STRIP.AUTO: 1.02 (ref 1–1.03)
TRIGL SERPL-MCNC: 101 MG/DL
UROBILINOGEN UR QL STRIP.AUTO: 1 E.U./DL
WBC # BLD AUTO: 6.33 THOUSAND/UL (ref 4.31–10.16)
WBC #/AREA URNS AUTO: ABNORMAL /HPF

## 2021-11-15 PROCEDURE — 85652 RBC SED RATE AUTOMATED: CPT

## 2021-11-15 PROCEDURE — 81001 URINALYSIS AUTO W/SCOPE: CPT

## 2021-11-15 PROCEDURE — 36415 COLL VENOUS BLD VENIPUNCTURE: CPT

## 2021-11-15 PROCEDURE — G1004 CDSM NDSC: HCPCS

## 2021-11-15 PROCEDURE — 74177 CT ABD & PELVIS W/CONTRAST: CPT

## 2021-11-15 PROCEDURE — 86140 C-REACTIVE PROTEIN: CPT

## 2021-11-15 PROCEDURE — 87591 N.GONORRHOEAE DNA AMP PROB: CPT

## 2021-11-15 PROCEDURE — 83036 HEMOGLOBIN GLYCOSYLATED A1C: CPT

## 2021-11-15 PROCEDURE — 87491 CHLMYD TRACH DNA AMP PROBE: CPT

## 2021-11-15 PROCEDURE — 80053 COMPREHEN METABOLIC PANEL: CPT

## 2021-11-15 PROCEDURE — 87086 URINE CULTURE/COLONY COUNT: CPT

## 2021-11-15 PROCEDURE — 99214 OFFICE O/P EST MOD 30 MIN: CPT | Performed by: NURSE PRACTITIONER

## 2021-11-15 PROCEDURE — 85025 COMPLETE CBC W/AUTO DIFF WBC: CPT

## 2021-11-15 PROCEDURE — 80061 LIPID PANEL: CPT

## 2021-11-15 RX ORDER — NITROFURANTOIN 25; 75 MG/1; MG/1
100 CAPSULE ORAL 2 TIMES DAILY
COMMUNITY
Start: 2021-11-11 | End: 2021-11-16

## 2021-11-15 RX ADMIN — IOHEXOL 100 ML: 350 INJECTION, SOLUTION INTRAVENOUS at 17:39

## 2021-11-16 ENCOUNTER — TELEPHONE (OUTPATIENT)
Dept: ADMINISTRATIVE | Facility: OTHER | Age: 41
End: 2021-11-16

## 2021-11-16 ENCOUNTER — OFFICE VISIT (OUTPATIENT)
Dept: OBGYN CLINIC | Facility: CLINIC | Age: 41
End: 2021-11-16
Payer: COMMERCIAL

## 2021-11-16 ENCOUNTER — TELEPHONE (OUTPATIENT)
Dept: OBGYN CLINIC | Facility: CLINIC | Age: 41
End: 2021-11-16

## 2021-11-16 VITALS
DIASTOLIC BLOOD PRESSURE: 84 MMHG | HEIGHT: 64 IN | WEIGHT: 151.4 LBS | BODY MASS INDEX: 25.85 KG/M2 | SYSTOLIC BLOOD PRESSURE: 110 MMHG

## 2021-11-16 DIAGNOSIS — N73.9 PELVIC INFLAMMATORY DISEASE: Primary | ICD-10-CM

## 2021-11-16 DIAGNOSIS — A54.9 GONORRHEA: ICD-10-CM

## 2021-11-16 LAB
BACTERIA UR CULT: NORMAL
C TRACH DNA SPEC QL NAA+PROBE: NEGATIVE
N GONORRHOEA DNA SPEC QL NAA+PROBE: POSITIVE

## 2021-11-16 PROCEDURE — 99242 OFF/OP CONSLTJ NEW/EST SF 20: CPT | Performed by: STUDENT IN AN ORGANIZED HEALTH CARE EDUCATION/TRAINING PROGRAM

## 2021-11-16 PROCEDURE — 96372 THER/PROPH/DIAG INJ SC/IM: CPT | Performed by: STUDENT IN AN ORGANIZED HEALTH CARE EDUCATION/TRAINING PROGRAM

## 2021-11-16 RX ORDER — DOXYCYCLINE 100 MG/1
100 TABLET ORAL 2 TIMES DAILY
Qty: 28 TABLET | Refills: 0 | Status: SHIPPED | OUTPATIENT
Start: 2021-11-16 | End: 2021-11-30

## 2021-11-16 RX ORDER — METRONIDAZOLE 500 MG/1
500 TABLET ORAL EVERY 12 HOURS SCHEDULED
Qty: 28 TABLET | Refills: 0 | Status: SHIPPED | OUTPATIENT
Start: 2021-11-16 | End: 2021-11-30

## 2021-11-16 RX ORDER — CEFTRIAXONE 500 MG/1
500 INJECTION, POWDER, FOR SOLUTION INTRAMUSCULAR; INTRAVENOUS ONCE
Status: COMPLETED | OUTPATIENT
Start: 2021-11-16 | End: 2021-11-16

## 2021-11-16 RX ADMIN — CEFTRIAXONE 500 MG: 500 INJECTION, POWDER, FOR SOLUTION INTRAMUSCULAR; INTRAVENOUS at 15:09

## 2021-11-18 ENCOUNTER — TELEPHONE (OUTPATIENT)
Dept: OBGYN CLINIC | Facility: CLINIC | Age: 41
End: 2021-11-18

## 2021-11-24 DIAGNOSIS — J30.1 SEASONAL ALLERGIC RHINITIS DUE TO POLLEN: ICD-10-CM

## 2021-11-24 RX ORDER — FLUTICASONE PROPIONATE 50 MCG
SPRAY, SUSPENSION (ML) NASAL
Qty: 16 ML | Refills: 0 | Status: SHIPPED | OUTPATIENT
Start: 2021-11-24 | End: 2021-12-19

## 2021-12-19 DIAGNOSIS — J30.1 SEASONAL ALLERGIC RHINITIS DUE TO POLLEN: ICD-10-CM

## 2021-12-19 RX ORDER — FLUTICASONE PROPIONATE 50 MCG
SPRAY, SUSPENSION (ML) NASAL
Qty: 16 ML | Refills: 0 | Status: SHIPPED | OUTPATIENT
Start: 2021-12-19 | End: 2022-01-14

## 2022-01-14 DIAGNOSIS — J30.1 SEASONAL ALLERGIC RHINITIS DUE TO POLLEN: ICD-10-CM

## 2022-01-14 RX ORDER — FLUTICASONE PROPIONATE 50 MCG
SPRAY, SUSPENSION (ML) NASAL
Qty: 16 ML | Refills: 0 | Status: SHIPPED | OUTPATIENT
Start: 2022-01-14 | End: 2022-02-16

## 2022-02-01 ENCOUNTER — ANNUAL EXAM (OUTPATIENT)
Dept: OBGYN CLINIC | Facility: CLINIC | Age: 42
End: 2022-02-01
Payer: COMMERCIAL

## 2022-02-01 VITALS
HEIGHT: 64 IN | BODY MASS INDEX: 25.64 KG/M2 | SYSTOLIC BLOOD PRESSURE: 118 MMHG | WEIGHT: 150.2 LBS | DIASTOLIC BLOOD PRESSURE: 72 MMHG

## 2022-02-01 DIAGNOSIS — Z01.419 ENCOUNTER FOR ANNUAL ROUTINE GYNECOLOGICAL EXAMINATION: Primary | ICD-10-CM

## 2022-02-01 DIAGNOSIS — N73.9 PELVIC INFLAMMATORY DISEASE: ICD-10-CM

## 2022-02-01 DIAGNOSIS — R10.2 PELVIC PAIN: ICD-10-CM

## 2022-02-01 DIAGNOSIS — Z12.31 ENCOUNTER FOR SCREENING MAMMOGRAM FOR MALIGNANT NEOPLASM OF BREAST: ICD-10-CM

## 2022-02-01 PROBLEM — Z31.69 ENCOUNTER FOR PRECONCEPTION CONSULTATION: Status: ACTIVE | Noted: 2022-02-01

## 2022-02-01 PROCEDURE — G0476 HPV COMBO ASSAY CA SCREEN: HCPCS | Performed by: STUDENT IN AN ORGANIZED HEALTH CARE EDUCATION/TRAINING PROGRAM

## 2022-02-01 PROCEDURE — 0503F POSTPARTUM CARE VISIT: CPT | Performed by: STUDENT IN AN ORGANIZED HEALTH CARE EDUCATION/TRAINING PROGRAM

## 2022-02-01 PROCEDURE — 87491 CHLMYD TRACH DNA AMP PROBE: CPT | Performed by: STUDENT IN AN ORGANIZED HEALTH CARE EDUCATION/TRAINING PROGRAM

## 2022-02-01 PROCEDURE — G0145 SCR C/V CYTO,THINLAYER,RESCR: HCPCS | Performed by: STUDENT IN AN ORGANIZED HEALTH CARE EDUCATION/TRAINING PROGRAM

## 2022-02-01 PROCEDURE — 87591 N.GONORRHOEAE DNA AMP PROB: CPT | Performed by: STUDENT IN AN ORGANIZED HEALTH CARE EDUCATION/TRAINING PROGRAM

## 2022-02-01 PROCEDURE — 99396 PREV VISIT EST AGE 40-64: CPT | Performed by: STUDENT IN AN ORGANIZED HEALTH CARE EDUCATION/TRAINING PROGRAM

## 2022-02-01 NOTE — ASSESSMENT & PLAN NOTE
GC positive 11/21  Completed ceftriaxone/doxy/flagyl treatment with resolution  Test for reinfection collected today
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
## 2022-02-01 NOTE — ASSESSMENT & PLAN NOTE
Advised PNV/folic acid  Offered rubella and varicella testing  Offered CF, SMA, HgE testing  Advise covid and flu vaccines  To return if not pregnant at 7 months given age

## 2022-02-01 NOTE — ASSESSMENT & PLAN NOTE
36 yo  here for annual    Pap 3/21 NILM, HPV other pos  Cotest today  Mammo   Reviewed breast self awareness  Discussed healthy diet and exercise  Sexually active, condoms

## 2022-02-03 LAB
C TRACH DNA SPEC QL NAA+PROBE: NEGATIVE
HPV HR 12 DNA CVX QL NAA+PROBE: POSITIVE
HPV16 DNA CVX QL NAA+PROBE: NEGATIVE
HPV18 DNA CVX QL NAA+PROBE: NEGATIVE
N GONORRHOEA DNA SPEC QL NAA+PROBE: NEGATIVE

## 2022-02-07 ENCOUNTER — TELEPHONE (OUTPATIENT)
Dept: LABOR AND DELIVERY | Facility: HOSPITAL | Age: 42
End: 2022-02-07

## 2022-02-07 LAB
LAB AP GYN PRIMARY INTERPRETATION: NORMAL
Lab: NORMAL

## 2022-02-07 NOTE — TELEPHONE ENCOUNTER
5/13 NILM  5/16 NILM  5/2017 NILM  3/2/20 ASCUS HPV other pos  3/3/21 NILM HPV other pos  2/1/22 NILM HPV other pos    Please let patient know her pap is negative for abnormal cells but has an HPV infection that is persistent  She should return for colposcopy

## 2022-02-16 DIAGNOSIS — J30.1 SEASONAL ALLERGIC RHINITIS DUE TO POLLEN: ICD-10-CM

## 2022-02-16 RX ORDER — FLUTICASONE PROPIONATE 50 MCG
SPRAY, SUSPENSION (ML) NASAL
Qty: 16 ML | Refills: 0 | Status: SHIPPED | OUTPATIENT
Start: 2022-02-16 | End: 2022-07-04

## 2022-04-12 ENCOUNTER — PROCEDURE VISIT (OUTPATIENT)
Dept: OBGYN CLINIC | Facility: CLINIC | Age: 42
End: 2022-04-12
Payer: COMMERCIAL

## 2022-04-12 VITALS
BODY MASS INDEX: 25.23 KG/M2 | HEIGHT: 64 IN | WEIGHT: 147.8 LBS | SYSTOLIC BLOOD PRESSURE: 100 MMHG | DIASTOLIC BLOOD PRESSURE: 60 MMHG

## 2022-04-12 DIAGNOSIS — Z32.02 NEGATIVE PREGNANCY TEST: ICD-10-CM

## 2022-04-12 DIAGNOSIS — Z86.19 HISTORY OF HPV INFECTION: Primary | ICD-10-CM

## 2022-04-12 LAB — SL AMB POCT URINE HCG: NEGATIVE

## 2022-04-12 PROCEDURE — 88305 TISSUE EXAM BY PATHOLOGIST: CPT | Performed by: PATHOLOGY

## 2022-04-12 PROCEDURE — 81025 URINE PREGNANCY TEST: CPT | Performed by: STUDENT IN AN ORGANIZED HEALTH CARE EDUCATION/TRAINING PROGRAM

## 2022-04-12 PROCEDURE — 57454 BX/CURETT OF CERVIX W/SCOPE: CPT | Performed by: STUDENT IN AN ORGANIZED HEALTH CARE EDUCATION/TRAINING PROGRAM

## 2022-04-12 NOTE — PROGRESS NOTES
Colposcopy    Date/Time: 4/12/2022 3:22 PM  Performed by: Monica Hudson MD  Authorized by: Monica Hudson MD     Consent:     Consent obtained:  Verbal    Consent given by:  Patient    Procedural risks discussed:  Bleeding, damage to other organs and infection    Patient questions answered: yes    Indication:     Indications: HPV pos    Procedure:     Procedure: Colposcopy w/ cervical biopsy and ECC      [place] speculum was placed in the vagina: yes      Under colposcopic examination the transition zone was seen in entirety: yes      Cervical biopsy performed with a cervical biopsy punch: yes      Biopsy(s): yes      Location:  3,7,12,ECC    Specimen to pathology: yes    Post-procedure:     Findings: White epithelium      Impression: Low grade cervical dysplasia    Comments:      History of HPV infection  Condoms  Nonsmoker    3/21 NILM HPV other pos  3/22 NILM HPV other pos  Colpo Bx 12,7,3, ECC

## 2022-04-28 ENCOUNTER — TELEPHONE (OUTPATIENT)
Dept: OBGYN CLINIC | Facility: CLINIC | Age: 42
End: 2022-04-28

## 2022-04-28 NOTE — TELEPHONE ENCOUNTER
Please let patient know biopsies showed mild level 1 changes from the HPV   Recommendation is to repeat her pap in one year

## 2022-06-18 ENCOUNTER — OFFICE VISIT (OUTPATIENT)
Dept: INTERNAL MEDICINE CLINIC | Facility: CLINIC | Age: 42
End: 2022-06-18
Payer: COMMERCIAL

## 2022-06-18 ENCOUNTER — APPOINTMENT (OUTPATIENT)
Dept: LAB | Facility: CLINIC | Age: 42
End: 2022-06-18
Payer: COMMERCIAL

## 2022-06-18 VITALS
SYSTOLIC BLOOD PRESSURE: 96 MMHG | HEIGHT: 64 IN | HEART RATE: 88 BPM | BODY MASS INDEX: 25.4 KG/M2 | OXYGEN SATURATION: 99 % | WEIGHT: 148.8 LBS | DIASTOLIC BLOOD PRESSURE: 62 MMHG | RESPIRATION RATE: 16 BRPM | TEMPERATURE: 100.2 F

## 2022-06-18 DIAGNOSIS — N39.41 URGE INCONTINENCE OF URINE: ICD-10-CM

## 2022-06-18 DIAGNOSIS — R10.2 PERINEAL PAIN IN FEMALE: ICD-10-CM

## 2022-06-18 DIAGNOSIS — R39.9 UTI SYMPTOMS: ICD-10-CM

## 2022-06-18 DIAGNOSIS — L29.0 ANAL ITCHING: Primary | ICD-10-CM

## 2022-06-18 LAB
BACTERIA UR QL AUTO: ABNORMAL /HPF
BILIRUB UR QL STRIP: NEGATIVE
CLARITY UR: CLEAR
COLOR UR: ABNORMAL
GLUCOSE UR STRIP-MCNC: NEGATIVE MG/DL
HCV AB SER QL: NORMAL
HGB UR QL STRIP.AUTO: NEGATIVE
KETONES UR STRIP-MCNC: NEGATIVE MG/DL
LEUKOCYTE ESTERASE UR QL STRIP: NEGATIVE
MUCOUS THREADS UR QL AUTO: ABNORMAL
NITRITE UR QL STRIP: NEGATIVE
NON-SQ EPI CELLS URNS QL MICRO: ABNORMAL /HPF
PH UR STRIP.AUTO: 5.5 [PH]
PROT UR STRIP-MCNC: NEGATIVE MG/DL
RBC #/AREA URNS AUTO: ABNORMAL /HPF
SP GR UR STRIP.AUTO: 1.02 (ref 1–1.03)
UROBILINOGEN UR STRIP-ACNC: <2 MG/DL
WBC #/AREA URNS AUTO: ABNORMAL /HPF

## 2022-06-18 PROCEDURE — 86803 HEPATITIS C AB TEST: CPT

## 2022-06-18 PROCEDURE — 86592 SYPHILIS TEST NON-TREP QUAL: CPT

## 2022-06-18 PROCEDURE — 87389 HIV-1 AG W/HIV-1&-2 AB AG IA: CPT

## 2022-06-18 PROCEDURE — 87591 N.GONORRHOEAE DNA AMP PROB: CPT | Performed by: INTERNAL MEDICINE

## 2022-06-18 PROCEDURE — 81001 URINALYSIS AUTO W/SCOPE: CPT | Performed by: INTERNAL MEDICINE

## 2022-06-18 PROCEDURE — 36415 COLL VENOUS BLD VENIPUNCTURE: CPT

## 2022-06-18 PROCEDURE — 87491 CHLMYD TRACH DNA AMP PROBE: CPT | Performed by: INTERNAL MEDICINE

## 2022-06-18 PROCEDURE — 87086 URINE CULTURE/COLONY COUNT: CPT

## 2022-06-18 PROCEDURE — 87529 HSV DNA AMP PROBE: CPT

## 2022-06-18 PROCEDURE — 99214 OFFICE O/P EST MOD 30 MIN: CPT | Performed by: INTERNAL MEDICINE

## 2022-06-18 RX ORDER — ZINC GLUCONATE 50 MG
50 TABLET ORAL DAILY
COMMUNITY

## 2022-06-18 NOTE — PROGRESS NOTES
INTERNAL MEDICINE FOLLOW-UP OFFICE VISIT  [facility] of BEHAVIORAL MEDICINE AT [facility]    NAME: Zoey Petty  AGE: 37 y o  SEX: female  : 1980   MRN: [id number]    DATE: 2022  TIME: 10:08 AM    Assessment and Plan     Diagnoses and all orders for this visit:    Anal itching   will get back to with the results     Perineal pain in female  -     Chlamydia/GC amplified DNA by PCR  -     Hepatitis C antibody; Future  -     HIV 1/2 Antigen/Antibody (4th Generation) w Reflex [facility]; Future  -     HSV TYPE 1,2 DNA PCR; Future  -     RPR; Future    Urge incontinence of urine  -     Ambulatory Referral to Urogynecology; Future    UTI symptoms  -     Urinalysis with microscopic  -     Urine culture; Future    Other orders  -     zinc gluconate 50 mg tablet; Take 50 mg by mouth daily        - Counseling Documentation: patient was counseled regarding: instructions for management, risk factor reductions, prognosis, patient and family education, risks and benefits of treatment options and importance of compliance with treatment  - Medication Side Effects: Adverse side effects of medications were reviewed with the patient/guardian today  Return to office in:  As needed    Chief Complaint     Chief Complaint   Patient presents with    Other     Sore and itching anus,and surrounding area         History of Present Illness     HPI  Patient is complaining of itching around the anus and in the perineal area  She had an episode of gonorrhea about 6 months ago  She also has alternating constipation and diarrhea and has hemorrhoids  The following portions of the patient's history were reviewed and updated as appropriate: allergies, current medications, past family history, past medical history, past social history, past surgical history and problem list     Review of Systems     Review of Systems   Constitutional: Negative for chills, diaphoresis, fatigue and fever     HENT: Negative for congestion, ear discharge, ear pain, hearing loss, postnasal drip, rhinorrhea, sinus pressure, sinus pain, sneezing, sore throat and voice change  Eyes: Negative for pain, discharge, redness and visual disturbance  Respiratory: Negative for cough, chest tightness, shortness of breath and wheezing  Cardiovascular: Negative for chest pain, palpitations and leg swelling  Gastrointestinal: Negative for abdominal distention, abdominal pain, blood in stool, constipation, diarrhea, nausea and vomiting  Endocrine: Negative for cold intolerance, heat intolerance, polydipsia, polyphagia and polyuria  Genitourinary: Positive for difficulty urinating, pelvic pain and urgency  Negative for dysuria, flank pain, frequency and hematuria  Musculoskeletal: Negative for arthralgias, back pain, gait problem, joint swelling, myalgias, neck pain and neck stiffness  Skin: Negative for rash  Neurological: Negative for dizziness, tremors, syncope, facial asymmetry, speech difficulty, weakness, light-headedness, numbness and headaches  Hematological: Does not bruise/bleed easily  Psychiatric/Behavioral: Negative for behavioral problems, confusion and sleep disturbance  The patient is not nervous/anxious          Active Problem List     Patient Active Problem List   Diagnosis    Allergic rhinitis    Anxiety state    Extrinsic asthma    Fibromyalgia    Severe menstrual cramps    Impaired fasting glucose    Hyperlipidemia    Urge incontinence of urine    Irritable bowel syndrome with constipation    Pelvic inflammatory disease    Encounter for annual routine gynecological examination    Encounter for preconception consultation    History of HPV infection       Objective     BP 96/62 (BP Location: Left arm, Patient Position: Sitting, Cuff Size: Standard)   Pulse 88   Temp 100 2 °F (37 9 °C) (Tympanic)   Resp 16   Ht 5' 4" (1 626 m)   Wt 67 5 kg (148 lb 12 8 oz)   SpO2 99%   BMI 25 54 kg/m²     Physical Exam  Constitutional: General: She is not in acute distress  Appearance: She is well-developed  She is not diaphoretic  HENT:      Head: Normocephalic and atraumatic  Right Ear: External ear normal       Left Ear: External ear normal       Nose: Nose normal    Eyes:      General: No scleral icterus  Right eye: No discharge  Left eye: No discharge  Conjunctiva/sclera: Conjunctivae normal    Neck:      Thyroid: No thyromegaly  Vascular: No JVD  Trachea: No tracheal deviation  Cardiovascular:      Rate and Rhythm: Normal rate and regular rhythm  Heart sounds: Normal heart sounds  No murmur heard  No friction rub  No gallop  Pulmonary:      Effort: Pulmonary effort is normal  No respiratory distress  Breath sounds: Normal breath sounds  No wheezing or rales  Chest:      Chest wall: No tenderness  Abdominal:      General: Bowel sounds are normal  There is no distension  Palpations: Abdomen is soft  Tenderness: There is no abdominal tenderness  There is no guarding or rebound  Genitourinary:     General: Normal vulva  Vagina: No vaginal discharge  Rectum: Normal    Musculoskeletal:         General: No tenderness  Normal range of motion  Cervical back: Normal range of motion and neck supple  Lymphadenopathy:      Cervical: No cervical adenopathy  Skin:     General: Skin is warm and dry  Findings: No erythema or rash  Neurological:      Mental Status: She is alert and oriented to person, place, and time  Cranial Nerves: No cranial nerve deficit  Motor: No abnormal muscle tone        Coordination: Coordination normal    Psychiatric:         Judgment: Judgment normal          Current Medications       Current Outpatient Medications:     albuterol (PROVENTIL HFA,VENTOLIN HFA) 90 mcg/act inhaler, Inhale, Disp: , Rfl:     Cholecalciferol 50 MCG (2000 UT) CAPS, Take by mouth, Disp: , Rfl:     fluticasone (FLONASE) 50 mcg/act nasal spray, INSTILL 1 SPRAY INTO EACH NOSTRIL 2 (TWO) TIMES A DAY WITH MEALS, Disp: 16 mL, Rfl: 0    levocetirizine (XYZAL) 5 MG tablet, Take 5 mg by mouth every evening, Disp: , Rfl:     Probiotic Product (PROBIOTIC ACIDOPHILUS BEADS PO), Take by mouth, Disp: , Rfl:     zinc gluconate 50 mg tablet, Take 50 mg by mouth daily, Disp: , Rfl:     Health Maintenance     Health Maintenance   Topic Date Due    Hepatitis C Screening  Never done    COVID-19 Vaccine (1) Never done    Pneumococcal Vaccine: Pediatrics (0 to 5 Years) and At-Risk Patients (6 to 59 Years) (1 - PCV) 09/25/1987    DTaP,Tdap,and Td Vaccines (1 - Tdap) 09/25/2002    PT PLAN OF CARE  06/19/2021    BMI: Followup Plan  11/23/2021    Influenza Vaccine (Season Ended) 09/01/2022    Depression Screening  11/15/2022    Breast Cancer Screening: Mammogram  09/27/2022    Annual Physical  02/01/2023    BMI: Adult  06/18/2023    Cervical Cancer Screening  02/01/2027    HIV Screening  Completed    HIB Vaccine  Aged Out    Hepatitis B Vaccine  Aged Out    IPV Vaccine  Aged Out    Hepatitis A Vaccine  Aged Out    Meningococcal ACWY Vaccine  Aged Out    HPV Vaccine  Aged Out     Immunization History   Administered Date(s) Administered    INFLUENZA 10/09/2013    Influenza Quadrivalent, 6-35 Months IM 11/25/2014, 12/14/2015, 12/19/2016, 12/19/2017    Influenza, injectable, quadrivalent, preservative free 0 5 mL 11/15/2019, 10/15/2020    Pneumococcal Polysaccharide PPV23 1980    Tdap 1980    Zoster 1980         MD [facility] of BEHAVIOR[facility] MEDICINE AT [facility]

## 2022-06-19 LAB
BACTERIA UR CULT: NORMAL
C TRACH DNA SPEC QL NAA+PROBE: NEGATIVE
HIV 1+2 AB+HIV1 P24 AG SERPL QL IA: NORMAL
N GONORRHOEA DNA SPEC QL NAA+PROBE: NEGATIVE

## 2022-06-20 LAB — RPR SER QL: NORMAL

## 2022-06-22 ENCOUNTER — TELEPHONE (OUTPATIENT)
Dept: INTERNAL MEDICINE CLINIC | Facility: CLINIC | Age: 42
End: 2022-06-22

## 2022-06-23 NOTE — TELEPHONE ENCOUNTER
Called pt   And was notified and wondering if there is a medicine she should  Be taking or dr  She should be seeing as her anus is still itching and stated she thinks it is prolapsed

## 2022-06-23 NOTE — TELEPHONE ENCOUNTER
Let her know that Dr Ld Kiser did not see anything  I would like to examine her before prescribing anything because using a steroid cream could potentially make things worse if she has yeast infection  She can try over-the-counter tucks pads with witch Hazel and lidocaine which may provide some relief  Also can look up scotch tape test for pinworm which is another very common cause of perianal itch

## 2022-06-24 LAB
HSV1 DNA SPEC QL NAA+PROBE: NEGATIVE
HSV2 DNA SPEC QL NAA+PROBE: NEGATIVE

## 2022-06-27 ENCOUNTER — TELEPHONE (OUTPATIENT)
Dept: INTERNAL MEDICINE CLINIC | Facility: CLINIC | Age: 42
End: 2022-06-27

## 2022-06-27 NOTE — TELEPHONE ENCOUNTER
----- Message from Tonja Paez MD sent at 6/27/2022 12:32 PM EDT -----   All labs are normal, please follow-up with Dr Wendy Solorzano if there is any other complaint

## 2022-06-30 ENCOUNTER — APPOINTMENT (OUTPATIENT)
Dept: LAB | Facility: CLINIC | Age: 42
End: 2022-06-30
Payer: COMMERCIAL

## 2022-06-30 ENCOUNTER — OFFICE VISIT (OUTPATIENT)
Dept: INTERNAL MEDICINE CLINIC | Facility: CLINIC | Age: 42
End: 2022-06-30
Payer: COMMERCIAL

## 2022-06-30 VITALS
WEIGHT: 147 LBS | TEMPERATURE: 97.6 F | OXYGEN SATURATION: 98 % | HEIGHT: 64 IN | RESPIRATION RATE: 20 BRPM | SYSTOLIC BLOOD PRESSURE: 118 MMHG | HEART RATE: 88 BPM | DIASTOLIC BLOOD PRESSURE: 68 MMHG | BODY MASS INDEX: 25.1 KG/M2

## 2022-06-30 DIAGNOSIS — L29.0 PERIANAL ITCH: ICD-10-CM

## 2022-06-30 DIAGNOSIS — Z12.31 ENCOUNTER FOR SCREENING MAMMOGRAM FOR BREAST CANCER: ICD-10-CM

## 2022-06-30 DIAGNOSIS — N94.819 VULVODYNIA: ICD-10-CM

## 2022-06-30 DIAGNOSIS — K64.1 GRADE II HEMORRHOIDS: Primary | ICD-10-CM

## 2022-06-30 DIAGNOSIS — Z23 ENCOUNTER FOR IMMUNIZATION: ICD-10-CM

## 2022-06-30 PROCEDURE — 86695 HERPES SIMPLEX TYPE 1 TEST: CPT

## 2022-06-30 PROCEDURE — 86696 HERPES SIMPLEX TYPE 2 TEST: CPT

## 2022-06-30 PROCEDURE — 99214 OFFICE O/P EST MOD 30 MIN: CPT | Performed by: INTERNAL MEDICINE

## 2022-06-30 RX ORDER — CLOTRIMAZOLE AND BETAMETHASONE DIPROPIONATE 10; .64 MG/G; MG/G
CREAM TOPICAL 2 TIMES DAILY
Qty: 30 G | Refills: 0 | Status: SHIPPED | OUTPATIENT
Start: 2022-06-30 | End: 2022-07-31
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
## 2022-06-30 NOTE — PATIENT INSTRUCTIONS
Hemorrhoids exacerbated by constipation-I recommend daily fiber supplement such as Metamucil, add stool softeners if needed  Avoid straining  Perianal itch-exam consistent with intertriginous Candida as well as some inflamed hemorrhoids will treat with Lotrisone  Check ova and parasite  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-recheck HSV antibodies    History of gonorrhea-we discussed how this infection is transmitted and the implications

## 2022-07-01 ENCOUNTER — APPOINTMENT (OUTPATIENT)
Dept: LAB | Facility: CLINIC | Age: 42
End: 2022-07-01
Payer: COMMERCIAL

## 2022-07-01 DIAGNOSIS — L29.0 PERIANAL ITCH: ICD-10-CM

## 2022-07-01 LAB
HSV1 IGG SER IA-ACNC: <0.91 INDEX (ref 0–0.9)
HSV2 IGG SER IA-ACNC: <0.91 INDEX (ref 0–0.9)

## 2022-07-01 PROCEDURE — 87209 SMEAR COMPLEX STAIN: CPT

## 2022-07-01 PROCEDURE — 87177 OVA AND PARASITES SMEARS: CPT

## 2022-07-03 DIAGNOSIS — J30.1 SEASONAL ALLERGIC RHINITIS DUE TO POLLEN: ICD-10-CM

## 2022-07-04 RX ORDER — FLUTICASONE PROPIONATE 50 MCG
SPRAY, SUSPENSION (ML) NASAL
Qty: 16 ML | Refills: 0 | Status: SHIPPED | OUTPATIENT
Start: 2022-07-04 | End: 2022-07-30

## 2022-07-30 DIAGNOSIS — J30.1 SEASONAL ALLERGIC RHINITIS DUE TO POLLEN: ICD-10-CM

## 2022-07-30 DIAGNOSIS — L29.0 PERIANAL ITCH: ICD-10-CM

## 2022-07-30 RX ORDER — FLUTICASONE PROPIONATE 50 MCG
SPRAY, SUSPENSION (ML) NASAL
Qty: 16 ML | Refills: 0 | Status: SHIPPED | OUTPATIENT
Start: 2022-07-30 | End: 2022-09-22

## 2022-07-31 RX ORDER — CLOTRIMAZOLE AND BETAMETHASONE DIPROPIONATE 10; .64 MG/G; MG/G
CREAM TOPICAL
Qty: 30 G | Refills: 0 | Status: SHIPPED | OUTPATIENT
Start: 2022-07-31 | End: 2022-09-21 | Stop reason: SDUPTHER

## 2022-08-04 ENCOUNTER — APPOINTMENT (OUTPATIENT)
Dept: LAB | Facility: CLINIC | Age: 42
End: 2022-08-04
Payer: COMMERCIAL

## 2022-08-04 ENCOUNTER — HOSPITAL ENCOUNTER (OUTPATIENT)
Dept: RADIOLOGY | Facility: HOSPITAL | Age: 42
Discharge: HOME | End: 2022-08-04
Payer: COMMERCIAL

## 2022-08-04 ENCOUNTER — OFFICE VISIT (OUTPATIENT)
Dept: INTERNAL MEDICINE CLINIC | Facility: CLINIC | Age: 42
End: 2022-08-04
Payer: COMMERCIAL

## 2022-08-04 VITALS
DIASTOLIC BLOOD PRESSURE: 60 MMHG | HEIGHT: 64 IN | TEMPERATURE: 98.8 F | HEART RATE: 90 BPM | SYSTOLIC BLOOD PRESSURE: 98 MMHG | RESPIRATION RATE: 18 BRPM | BODY MASS INDEX: 24.65 KG/M2 | WEIGHT: 144.4 LBS | OXYGEN SATURATION: 98 %

## 2022-08-04 DIAGNOSIS — M62.89 PELVIC FLOOR DYSFUNCTION: ICD-10-CM

## 2022-08-04 DIAGNOSIS — F43.10 PTSD (POST-TRAUMATIC STRESS DISORDER): ICD-10-CM

## 2022-08-04 DIAGNOSIS — N39.41 URGE INCONTINENCE OF URINE: ICD-10-CM

## 2022-08-04 DIAGNOSIS — K58.1 IRRITABLE BOWEL SYNDROME WITH CONSTIPATION: ICD-10-CM

## 2022-08-04 DIAGNOSIS — M62.89 PELVIC FLOOR DYSFUNCTION: Primary | ICD-10-CM

## 2022-08-04 LAB
BACTERIA UR QL AUTO: ABNORMAL /HPF
BILIRUB UR QL STRIP: NEGATIVE
CAOX CRY URNS QL MICRO: ABNORMAL /HPF
CLARITY UR: ABNORMAL
COLOR UR: ABNORMAL
GLUCOSE UR STRIP-MCNC: NEGATIVE MG/DL
HGB UR QL STRIP.AUTO: NEGATIVE
KETONES UR STRIP-MCNC: NEGATIVE MG/DL
LEUKOCYTE ESTERASE UR QL STRIP: NEGATIVE
MUCOUS THREADS UR QL AUTO: ABNORMAL
NITRITE UR QL STRIP: NEGATIVE
NON-SQ EPI CELLS URNS QL MICRO: ABNORMAL /HPF
PH UR STRIP.AUTO: 6 [PH]
PROT UR STRIP-MCNC: ABNORMAL MG/DL
RBC #/AREA URNS AUTO: ABNORMAL /HPF
SP GR UR STRIP.AUTO: 1.02 (ref 1–1.03)
UROBILINOGEN UR STRIP-ACNC: <2 MG/DL
WBC #/AREA URNS AUTO: ABNORMAL /HPF

## 2022-08-04 PROCEDURE — 99214 OFFICE O/P EST MOD 30 MIN: CPT | Performed by: INTERNAL MEDICINE

## 2022-08-04 PROCEDURE — 81001 URINALYSIS AUTO W/SCOPE: CPT | Performed by: INTERNAL MEDICINE

## 2022-08-04 PROCEDURE — 74022 RADEX COMPL AQT ABD SERIES: CPT

## 2022-08-04 NOTE — PATIENT INSTRUCTIONS
Check obstruction series to assess stool pattern and fecal burden, check urinalysis to rule out infection  Consider referral back to GI based upon findings      Referral to pelvic PT    Keep Urogynecology appointment as scheduled
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
## 2022-08-24 ENCOUNTER — EVALUATION (OUTPATIENT)
Dept: PHYSICAL THERAPY | Age: 42
End: 2022-08-24
Payer: COMMERCIAL

## 2022-08-24 DIAGNOSIS — R10.2 PELVIC PAIN: Primary | ICD-10-CM

## 2022-08-24 DIAGNOSIS — M62.89 PELVIC FLOOR DYSFUNCTION: ICD-10-CM

## 2022-08-24 PROCEDURE — 97530 THERAPEUTIC ACTIVITIES: CPT | Performed by: PHYSICAL THERAPIST

## 2022-08-24 PROCEDURE — 97110 THERAPEUTIC EXERCISES: CPT | Performed by: PHYSICAL THERAPIST

## 2022-08-24 PROCEDURE — 97162 PT EVAL MOD COMPLEX 30 MIN: CPT | Performed by: PHYSICAL THERAPIST

## 2022-09-01 ENCOUNTER — TELEPHONE (OUTPATIENT)
Dept: INTERNAL MEDICINE CLINIC | Facility: CLINIC | Age: 42
End: 2022-09-01

## 2022-09-01 DIAGNOSIS — E78.5 HYPERLIPIDEMIA, UNSPECIFIED HYPERLIPIDEMIA TYPE: Primary | ICD-10-CM

## 2022-09-01 DIAGNOSIS — R73.01 IMPAIRED FASTING GLUCOSE: ICD-10-CM

## 2022-09-01 NOTE — TELEPHONE ENCOUNTER
Pt would like labs ordered for her phys appt with dr Lauro Vinson on 09/21/22, please advise, call back upon completion

## 2022-09-06 ENCOUNTER — APPOINTMENT (OUTPATIENT)
Dept: LAB | Facility: CLINIC | Age: 42
End: 2022-09-06
Payer: COMMERCIAL

## 2022-09-06 DIAGNOSIS — R73.01 IMPAIRED FASTING GLUCOSE: ICD-10-CM

## 2022-09-06 DIAGNOSIS — E78.5 HYPERLIPIDEMIA, UNSPECIFIED HYPERLIPIDEMIA TYPE: ICD-10-CM

## 2022-09-06 LAB
ALBUMIN SERPL BCP-MCNC: 3.8 G/DL (ref 3.5–5)
ALP SERPL-CCNC: 89 U/L (ref 46–116)
ALT SERPL W P-5'-P-CCNC: 20 U/L (ref 12–78)
ANION GAP SERPL CALCULATED.3IONS-SCNC: 6 MMOL/L (ref 4–13)
AST SERPL W P-5'-P-CCNC: 11 U/L (ref 5–45)
BASOPHILS # BLD AUTO: 0.02 THOUSANDS/ΜL (ref 0–0.1)
BASOPHILS NFR BLD AUTO: 0 % (ref 0–1)
BILIRUB SERPL-MCNC: 0.53 MG/DL (ref 0.2–1)
BUN SERPL-MCNC: 15 MG/DL (ref 5–25)
CALCIUM SERPL-MCNC: 9.1 MG/DL (ref 8.3–10.1)
CHLORIDE SERPL-SCNC: 109 MMOL/L (ref 96–108)
CHOLEST SERPL-MCNC: 183 MG/DL
CO2 SERPL-SCNC: 26 MMOL/L (ref 21–32)
CREAT SERPL-MCNC: 0.8 MG/DL (ref 0.6–1.3)
EOSINOPHIL # BLD AUTO: 0.03 THOUSAND/ΜL (ref 0–0.61)
EOSINOPHIL NFR BLD AUTO: 1 % (ref 0–6)
ERYTHROCYTE [DISTWIDTH] IN BLOOD BY AUTOMATED COUNT: 13.6 % (ref 11.6–15.1)
GFR SERPL CREATININE-BSD FRML MDRD: 92 ML/MIN/1.73SQ M
GLUCOSE P FAST SERPL-MCNC: 91 MG/DL (ref 65–99)
HCT VFR BLD AUTO: 42 % (ref 34.8–46.1)
HDLC SERPL-MCNC: 48 MG/DL
HGB BLD-MCNC: 13 G/DL (ref 11.5–15.4)
IMM GRANULOCYTES # BLD AUTO: 0 THOUSAND/UL (ref 0–0.2)
IMM GRANULOCYTES NFR BLD AUTO: 0 % (ref 0–2)
LDLC SERPL CALC-MCNC: 122 MG/DL (ref 0–100)
LYMPHOCYTES # BLD AUTO: 1.87 THOUSANDS/ΜL (ref 0.6–4.47)
LYMPHOCYTES NFR BLD AUTO: 35 % (ref 14–44)
MCH RBC QN AUTO: 27 PG (ref 26.8–34.3)
MCHC RBC AUTO-ENTMCNC: 31 G/DL (ref 31.4–37.4)
MCV RBC AUTO: 87 FL (ref 82–98)
MONOCYTES # BLD AUTO: 0.41 THOUSAND/ΜL (ref 0.17–1.22)
MONOCYTES NFR BLD AUTO: 8 % (ref 4–12)
NEUTROPHILS # BLD AUTO: 3.07 THOUSANDS/ΜL (ref 1.85–7.62)
NEUTS SEG NFR BLD AUTO: 56 % (ref 43–75)
NONHDLC SERPL-MCNC: 135 MG/DL
NRBC BLD AUTO-RTO: 0 /100 WBCS
PLATELET # BLD AUTO: 310 THOUSANDS/UL (ref 149–390)
PMV BLD AUTO: 11 FL (ref 8.9–12.7)
POTASSIUM SERPL-SCNC: 3.4 MMOL/L (ref 3.5–5.3)
PROT SERPL-MCNC: 7.8 G/DL (ref 6.4–8.4)
RBC # BLD AUTO: 4.82 MILLION/UL (ref 3.81–5.12)
SODIUM SERPL-SCNC: 141 MMOL/L (ref 135–147)
TRIGL SERPL-MCNC: 64 MG/DL
TSH SERPL DL<=0.05 MIU/L-ACNC: 1.16 UIU/ML (ref 0.45–4.5)
WBC # BLD AUTO: 5.4 THOUSAND/UL (ref 4.31–10.16)

## 2022-09-06 PROCEDURE — 36415 COLL VENOUS BLD VENIPUNCTURE: CPT

## 2022-09-06 PROCEDURE — 83036 HEMOGLOBIN GLYCOSYLATED A1C: CPT

## 2022-09-06 PROCEDURE — 80061 LIPID PANEL: CPT

## 2022-09-06 PROCEDURE — 85025 COMPLETE CBC W/AUTO DIFF WBC: CPT

## 2022-09-06 PROCEDURE — 84443 ASSAY THYROID STIM HORMONE: CPT

## 2022-09-06 PROCEDURE — 80053 COMPREHEN METABOLIC PANEL: CPT

## 2022-09-07 LAB
EST. AVERAGE GLUCOSE BLD GHB EST-MCNC: 108 MG/DL
HBA1C MFR BLD: 5.4 %

## 2022-09-08 ENCOUNTER — OFFICE VISIT (OUTPATIENT)
Dept: PHYSICAL THERAPY | Age: 42
End: 2022-09-08
Payer: COMMERCIAL

## 2022-09-08 DIAGNOSIS — R10.2 PELVIC PAIN: ICD-10-CM

## 2022-09-08 DIAGNOSIS — M62.89 PELVIC FLOOR DYSFUNCTION: Primary | ICD-10-CM

## 2022-09-08 PROCEDURE — 97110 THERAPEUTIC EXERCISES: CPT | Performed by: PHYSICAL THERAPIST

## 2022-09-08 PROCEDURE — 97140 MANUAL THERAPY 1/> REGIONS: CPT | Performed by: PHYSICAL THERAPIST

## 2022-09-08 PROCEDURE — 97530 THERAPEUTIC ACTIVITIES: CPT | Performed by: PHYSICAL THERAPIST

## 2022-09-08 NOTE — PROGRESS NOTES
Daily Note     Today's date: 2022  Patient name: Emely Brooke  : 1980  MRN: [id number]  Referring provider: Brayan Koo MD  Dx:   Encounter Diagnosis     ICD-10-CM    1  Pelvic floor dysfunction  M62 89    2  Pelvic pain  R10 2        Start Time: 6308  Stop Time: 1300  Total time in clinic (min): 58 minutes    Subjective:  Having some success with the squatty potty  Patient -5/10 today with cramping in lower abdomen and vaginal area that feels tight  Objective: See treatment diary below  Direct PFM muscle examination this date  2+/5 strength R; 2/5 Left with increased resting tone, worse left  Delayed relaxation post activation  -7/10 to palpation Relief with palpation to ext LA at ischial tuberosity  Assessment: Tolerated treatment well  Positive response to pelvic transverse plane  Patient would benefit from continued PT HEP instruction this date  Verbalized and demonstrated understanding  Written handouts provided  Reviewed yoga poses that patient could do at home with a deck of poses patient presented to treatment today  Advised increasing water intake  Patient now using 32 ounce water bottle to track fluid intake  Starting with 10 ounces of water  Plan: Continue per plan of care        Precautions: IBS, PTSD      Manuals            Direct PFM examination  15                                                  Neuro Re-Ed                                                                                                        Ther Ex             LE ROM 8  eval 10  HEP           Piriformis/adductor stretch HEP verbal            Pelvic transverse plane  15                                                                            Ther Activity             Squatty potty ed 5            Bladder health ed 5 5           PFM ed 5 10           Gait Training                                       Modalities

## 2022-09-14 NOTE — PROGRESS NOTES
Daily Note     Today's date: 9/15/2022  Patient name: Héctor Champion  : 1980  MRN: [id number]  Referring provider: Collin Paez MD  Dx:   Encounter Diagnosis     ICD-10-CM    1  Pelvic floor dysfunction  M62 89    2  Pelvic pain  R10 2        Start Time: 1330  Stop Time: 7272  Total time in clinic (min): 58 minutes    Subjective: Notes she is doing a little better drinking water and Pedilyte  Started taking magnesium  Has been doing her stretching  Notes some bloating  4-5/10 lower abdominal discomfort  Left LBP soreness  Objective: See treatment diary below      Assessment: Tolerated treatment well  Patient would benefit from continued PTAdded CRK to HEP for PFM relaxation  HEP instruction this date  Verbalized and demonstrated understanding  Written handouts provided  Did well with pelvic and diaphragm releases      Plan: Continue per plan of care        Precautions: IBS, PTSD      Manuals 8/24 9/8 9/15          Direct PFM examination  15                                                  Neuro Re-Ed                                                                                                        Ther Ex             LE ROM 8  eval 10  HEP 10          Piriformis/adductor stretch HEP verbal            Pelvic/julieth transverse plane  15 25 (MT)                       CRK   3"/10"  10x  HEP                                                 Ther Activity             Squatty potty ed 5            Bladder health ed 5 5           PFM ed 5 10 10          Gait Training                                       Modalities

## 2022-09-15 ENCOUNTER — OFFICE VISIT (OUTPATIENT)
Dept: PHYSICAL THERAPY | Age: 42
End: 2022-09-15
Payer: COMMERCIAL

## 2022-09-15 DIAGNOSIS — R10.2 PELVIC PAIN: ICD-10-CM

## 2022-09-15 DIAGNOSIS — M62.89 PELVIC FLOOR DYSFUNCTION: Primary | ICD-10-CM

## 2022-09-15 PROCEDURE — 97110 THERAPEUTIC EXERCISES: CPT | Performed by: PHYSICAL THERAPIST

## 2022-09-15 PROCEDURE — 97530 THERAPEUTIC ACTIVITIES: CPT | Performed by: PHYSICAL THERAPIST

## 2022-09-15 PROCEDURE — 97140 MANUAL THERAPY 1/> REGIONS: CPT | Performed by: PHYSICAL THERAPIST

## 2022-09-21 ENCOUNTER — OFFICE VISIT (OUTPATIENT)
Dept: INTERNAL MEDICINE CLINIC | Facility: CLINIC | Age: 42
End: 2022-09-21
Payer: COMMERCIAL

## 2022-09-21 VITALS
TEMPERATURE: 97.6 F | DIASTOLIC BLOOD PRESSURE: 60 MMHG | OXYGEN SATURATION: 99 % | BODY MASS INDEX: 25.85 KG/M2 | HEART RATE: 88 BPM | RESPIRATION RATE: 16 BRPM | HEIGHT: 64 IN | SYSTOLIC BLOOD PRESSURE: 90 MMHG | WEIGHT: 151.4 LBS

## 2022-09-21 DIAGNOSIS — F43.10 PTSD (POST-TRAUMATIC STRESS DISORDER): ICD-10-CM

## 2022-09-21 DIAGNOSIS — L29.0 PERIANAL ITCH: ICD-10-CM

## 2022-09-21 DIAGNOSIS — M79.7 FIBROMYALGIA: ICD-10-CM

## 2022-09-21 DIAGNOSIS — J45.20 MILD INTERMITTENT EXTRINSIC ASTHMA WITHOUT COMPLICATION: ICD-10-CM

## 2022-09-21 DIAGNOSIS — Z00.00 WELL ADULT EXAM: Primary | ICD-10-CM

## 2022-09-21 DIAGNOSIS — R73.01 IMPAIRED FASTING GLUCOSE: ICD-10-CM

## 2022-09-21 PROCEDURE — 99396 PREV VISIT EST AGE 40-64: CPT | Performed by: INTERNAL MEDICINE

## 2022-09-21 RX ORDER — CLOTRIMAZOLE AND BETAMETHASONE DIPROPIONATE 10; .64 MG/G; MG/G
1 CREAM TOPICAL 2 TIMES DAILY
Qty: 30 G | Refills: 0 | Status: SHIPPED | OUTPATIENT
Start: 2022-09-21 | End: 2022-09-23

## 2022-09-21 NOTE — PROGRESS NOTES
Assessment/Plan:    Diagnoses and all orders for this visit:    Well adult exam  -     CBC and differential; Future  -     Comprehensive metabolic panel; Future  -     Lipid panel; Future  -     Hemoglobin A1C; Future  -     TSH, 3rd generation with Free T4 reflex; Future    Perianal itch  -     clotrimazole-betamethasone (LOTRISONE) 1-0 05 % cream; Apply 1 application topically 2 (two) times a day To affected area    PTSD (post-traumatic stress disorder)    Fibromyalgia    Mild intermittent extrinsic asthma without complication    Impaired fasting glucose            Patient Instructions   Lab data reviewed in detail and compared prior    Health maintenance-mammogram ordered by Gynecology, recommend flu shot mid October, COVID vaccine discussed though patient reluctant  Otherwise up-to-date    Continue with pelvic floor PT as well as therapist     Follow-up for yearly physical and sooner as needed  Subjective:      Patient ID: Laurent Barry is a 37 y o  female    Yearly well visit  Seeing pelvic PT and doing much better w/ both urination and bm  Better w/ squatty potty  Perineal rash has improved significantly w/ epsom salt baths, maricel oil, oregano oil as well as lotrisone prn  Back w/ therapist, Savanna Sanchez weekly  Working on a plan to move out on her own  Meditating  Notes some myoclonic jerks again             Current Outpatient Medications:     albuterol (PROVENTIL HFA,VENTOLIN HFA) 90 mcg/act inhaler, Inhale, Disp: , Rfl:     Cholecalciferol 50 MCG (2000 UT) CAPS, Take 3 capsules by mouth 30236 units daily liquid, Disp: , Rfl:     clotrimazole-betamethasone (LOTRISONE) 1-0 05 % cream, Apply 1 application topically 2 (two) times a day To affected area, Disp: 30 g, Rfl: 0    fluticasone (FLONASE) 50 mcg/act nasal spray, INSTILL 1 SPRAY INTO EACH NOSTRIL 2 (TWO) TIMES A DAY WITH MEALS, Disp: 16 mL, Rfl: 0    levocetirizine (XYZAL) 5 MG tablet, Take 5 mg by mouth every evening, Disp: , Rfl:     zinc gluconate 50 mg tablet, Take 50 mg by mouth daily, Disp: , Rfl:     Probiotic Product (PROBIOTIC ACIDOPHILUS BEADS PO), Take by mouth (Patient not taking: Reported on 9/21/2022), Disp: , Rfl:     Recent Results (from the past 1008 hour(s))   CBC and differential    Collection Time: 09/06/22 12:57 PM   Result Value Ref Range    WBC 5 40 4 31 - 10 16 Thousand/uL    RBC 4 82 3 81 - 5 12 Million/uL    Hemoglobin 13 0 11 5 - 15 4 g/dL    Hematocrit 42 0 34 8 - 46 1 %    MCV 87 82 - 98 fL    MCH 27 0 26 8 - 34 3 pg    MCHC 31 0 (L) 31 4 - 37 4 g/dL    RDW 13 6 11 6 - 15 1 %    MPV 11 0 8 9 - 12 7 fL    Platelets 908 143 - 571 Thousands/uL    nRBC 0 /100 WBCs    Neutrophils Relative 56 43 - 75 %    Immat GRANS % 0 0 - 2 %    Lymphocytes Relative 35 14 - 44 %    Monocytes Relative 8 4 - 12 %    Eosinophils Relative 1 0 - 6 %    Basophils Relative 0 0 - 1 %    Neutrophils Absolute 3 07 1 85 - 7 62 Thousands/µL    Immature Grans Absolute 0 00 0 00 - 0 20 Thousand/uL    Lymphocytes Absolute 1 87 0 60 - 4 47 Thousands/µL    Monocytes Absolute 0 41 0 17 - 1 22 Thousand/µL    Eosinophils Absolute 0 03 0 00 - 0 61 Thousand/µL    Basophils Absolute 0 02 0 00 - 0 10 Thousands/µL   Comprehensive metabolic panel    Collection Time: 09/06/22 12:57 PM   Result Value Ref Range    Sodium 141 135 - 147 mmol/L    Potassium 3 4 (L) 3 5 - 5 3 mmol/L    Chloride 109 (H) 96 - 108 mmol/L    CO2 26 21 - 32 mmol/L    ANION GAP 6 4 - 13 mmol/L    BUN 15 5 - 25 mg/dL    Creatinine 0 80 0 60 - 1 30 mg/dL    Glucose, Fasting 91 65 - 99 mg/dL    Calcium 9 1 8 3 - 10 1 mg/dL    AST 11 5 - 45 U/L    ALT 20 12 - 78 U/L    Alkaline Phosphatase 89 46 - 116 U/L    Total Protein 7 8 6 4 - 8 4 g/dL    Albumin 3 8 3 5 - 5 0 g/dL    Total Bilirubin 0 53 0 20 - 1 00 mg/dL    eGFR 92 ml/min/1 73sq m   Lipid panel    Collection Time: 09/06/22 12:57 PM   Result Value Ref Range    Cholesterol 183 See Comment mg/dL    Triglycerides 64 See Comment mg/dL HDL, Direct 48 (L) >=50 mg/dL    LDL Calculated 122 (H) 0 - 100 mg/dL    Non-HDL-Chol (CHOL-HDL) 135 mg/dl   Hemoglobin A1C    Collection Time: 09/06/22 12:57 PM   Result Value Ref Range    Hemoglobin A1C 5 4 Normal 3 8-5 6%; PreDiabetic 5 7-6 4%; Diabetic >=6 5%; Glycemic control for adults with diabetes <7 0% %     mg/dl   TSH, 3rd generation with Free T4 reflex    Collection Time: 09/06/22 12:57 PM   Result Value Ref Range    TSH 3RD GENERATON 1 160 0 450 - 4 500 uIU/mL       The following portions of the patient's history were reviewed and updated as appropriate: allergies, current medications, past family history, past medical history, past social history, past surgical history and problem list      Review of Systems   Constitutional: Negative for appetite change, chills, diaphoresis, fatigue, fever and unexpected weight change  HENT: Negative for congestion, hearing loss and rhinorrhea  Eyes: Negative for visual disturbance  Respiratory: Negative for cough, chest tightness, shortness of breath and wheezing  Cardiovascular: Negative for chest pain, palpitations and leg swelling  Gastrointestinal: Negative for abdominal pain and blood in stool  Endocrine: Negative for cold intolerance, heat intolerance, polydipsia and polyuria  Genitourinary: Negative for difficulty urinating, dysuria, frequency and urgency  Musculoskeletal: Positive for myalgias  Negative for arthralgias  Skin: Negative for rash  Neurological: Negative for dizziness, weakness, light-headedness and headaches  Hematological: Does not bruise/bleed easily  Psychiatric/Behavioral: Negative for dysphoric mood and sleep disturbance  Objective:      Vitals:    09/21/22 1512   BP: 90/60   Pulse: 88   Resp: 16   Temp: 97 6 °F (36 4 °C)   SpO2: 99%          Physical Exam  Constitutional:       Appearance: She is well-developed  HENT:      Head: Normocephalic and atraumatic        Nose: Nose normal    Eyes: General: No scleral icterus  Conjunctiva/sclera: Conjunctivae normal       Pupils: Pupils are equal, round, and reactive to light  Neck:      Thyroid: No thyromegaly  Vascular: No JVD  Trachea: No tracheal deviation  Cardiovascular:      Rate and Rhythm: Normal rate and regular rhythm  Heart sounds: No murmur heard  No friction rub  No gallop  Pulmonary:      Effort: Pulmonary effort is normal  No respiratory distress  Breath sounds: Normal breath sounds  No wheezing or rales  Musculoskeletal:         General: No deformity  Cervical back: Normal range of motion and neck supple  Lymphadenopathy:      Cervical: No cervical adenopathy  Skin:     General: Skin is warm and dry  Coloration: Skin is not pale  Findings: No erythema or rash  Neurological:      Mental Status: She is alert and oriented to person, place, and time  Cranial Nerves: No cranial nerve deficit  Psychiatric:         Behavior: Behavior normal          Thought Content:  Thought content normal          Judgment: Judgment normal

## 2022-09-21 NOTE — PATIENT INSTRUCTIONS
Lab data reviewed in detail and compared prior    Health maintenance-mammogram ordered by Gynecology, recommend flu shot mid October, COVID vaccine discussed though patient reluctant  Otherwise up-to-date    Continue with pelvic floor PT as well as therapist     Follow-up for yearly physical and sooner as needed

## 2022-09-22 ENCOUNTER — OFFICE VISIT (OUTPATIENT)
Dept: PHYSICAL THERAPY | Age: 42
End: 2022-09-22
Payer: COMMERCIAL

## 2022-09-22 DIAGNOSIS — L29.0 PERIANAL ITCH: ICD-10-CM

## 2022-09-22 DIAGNOSIS — J30.1 SEASONAL ALLERGIC RHINITIS DUE TO POLLEN: ICD-10-CM

## 2022-09-22 DIAGNOSIS — M62.89 PELVIC FLOOR DYSFUNCTION: Primary | ICD-10-CM

## 2022-09-22 DIAGNOSIS — R10.2 PELVIC PAIN: ICD-10-CM

## 2022-09-22 PROCEDURE — 97530 THERAPEUTIC ACTIVITIES: CPT | Performed by: PHYSICAL THERAPIST

## 2022-09-22 PROCEDURE — 97140 MANUAL THERAPY 1/> REGIONS: CPT | Performed by: PHYSICAL THERAPIST

## 2022-09-22 RX ORDER — FLUTICASONE PROPIONATE 50 MCG
SPRAY, SUSPENSION (ML) NASAL
Qty: 16 ML | Refills: 0 | Status: SHIPPED | OUTPATIENT
Start: 2022-09-22 | End: 2022-10-24

## 2022-09-22 NOTE — PROGRESS NOTES
Daily Note     Today's date: 2022  Patient name: Doron Sims  : 1980  MRN: [id number]  Referring provider: Carlyle Ortega MD  Dx:   Encounter Diagnosis     ICD-10-CM    1  Pelvic floor dysfunction  M62 89    2  Pelvic pain  R10 2        Start Time: 1300  Stop Time: 1355  Total time in clinic (min): 55 minutes    Subjective: Notes feeling crampy left abdominal region  Also notes low back achiness      Objective: See treatment diary below      Assessment: Tolerated treatment well  Patient would benefit from continued PT Instructed in ILU massage for HEP  Advised nightly to tolerance  Written handout provided  Improved bowel and bladder symptoms that vary  Plan: Continue per plan of care        Precautions: IBS, PTSD      Manuals 8/24 9/8 9/15 9/22         Direct PFM examination  15                                                  Neuro Re-Ed                                                                                                        Ther Ex             LE ROM 8  eval 10  HEP 10 13         Piriformis/adductor stretch HEP verbal            Pelvic/julieth transverse plane  15 25 (MT) 25                      CRK   3"/10"  10x  HEP 5                                                Ther Activity             Squatty potty ed 5            Bladder health ed 5 5           PFM ed 5 10 10 ILU  10         Gait Training                                       Modalities

## 2022-09-23 RX ORDER — CLOTRIMAZOLE AND BETAMETHASONE DIPROPIONATE 10; .64 MG/G; MG/G
CREAM TOPICAL
Qty: 30 G | Refills: 0 | Status: SHIPPED | OUTPATIENT
Start: 2022-09-23

## 2022-09-28 NOTE — PROGRESS NOTES
Daily Note     Today's date: 2022  Patient name: Max Bal  : 1980  MRN: [id number]  Referring provider: Mary Renteria MD  Dx:   Encounter Diagnosis     ICD-10-CM    1  Pelvic floor dysfunction  M62 89    2  Pelvic pain  R10 2        Start Time: 1300  Stop Time: 1355  Total time in clinic (min): 55 minutes    Subjective: Patient notes some right sided abdominal pain 5/10  Notes some increased IBS symptoms with constipation  Notes drinking up to 16 ounces water per day  Pain in perineum that feels swollen 6/10  Reported reduced symptoms post treatment  Objective: See treatment diary below      Assessment: Tolerated treatment well  Patient would benefit from continued PT Advised sidelying self trigger point release to LA to tolerance  Decreased right LAQ discomfort post treatment  Doing self stretching and yoga at home as able  Plan: Continue per plan of care        Precautions: IBS, PTSD      Manuals 8/24 9/8 9/15 9/22 9/29        Direct PFM examination  15   ind  15                                               Neuro Re-Ed                                                                                                        Ther Ex             LE ROM 8  eval 10  HEP 10 13 13        Piriformis/adductor stretch HEP verbal            Pelvic/julieth transverse plane  15 25 (MT) 25 25                     CRK   3"/10"  10x  HEP 5 5                                               Ther Activity             Squatty potty ed 5            Bladder health ed 5 5           PFM ed 5 10 10 ILU  10         Gait Training                                       Modalities

## 2022-09-29 ENCOUNTER — OFFICE VISIT (OUTPATIENT)
Dept: PHYSICAL THERAPY | Age: 42
End: 2022-09-29
Payer: COMMERCIAL

## 2022-09-29 DIAGNOSIS — R10.2 PELVIC PAIN: ICD-10-CM

## 2022-09-29 DIAGNOSIS — M62.89 PELVIC FLOOR DYSFUNCTION: Primary | ICD-10-CM

## 2022-09-29 PROCEDURE — 97110 THERAPEUTIC EXERCISES: CPT | Performed by: PHYSICAL THERAPIST

## 2022-09-29 PROCEDURE — 97140 MANUAL THERAPY 1/> REGIONS: CPT | Performed by: PHYSICAL THERAPIST

## 2022-10-05 NOTE — PROGRESS NOTES
Daily Note     Today's date: 10/6/2022  Patient name: Otf Joyner  : 1980  MRN: [id number]  Referring provider: Anna Luna MD  Dx:   Encounter Diagnosis     ICD-10-CM    1  Pelvic floor dysfunction  M62 89    2  Pelvic pain  R10 2                   Subjective: Patient notes upper back soreness today  Notes lower abdominal discomfort that is achy in nature  Notes rectal and vaginal discomfort is more like an ache today  Nearing 16 ounces of water  Reports that she feels PT has been helpful thus far  Urinary symptoms/urgency worse when constipated  Less constipation reported but still present  Objective: See treatment diary below      Assessment: Tolerated treatment well  Patient would benefit from continued PT Right psoas tightness  Good LE flexibility  Right LA tightness that eased with MT  Plan: Continue per plan of care        Precautions: IBS, PTSD      Manuals 8/24 9/8 9/15 9/22 9/29 10/6       Direct PFM examination  15   ind  15 ext             15                                 Neuro Re-Ed                                                                                                        Ther Ex             LE ROM 8  eval 10  HEP 10 13 13 15       Piriformis/adductor stretch HEP verbal            Pelvic/julieth transverse plane  15 25 (MT) 25 25 20                    CRK   3"/10"  10x  HEP 5 5 5                                              Ther Activity             Squatty potty ed 5            Bladder health ed 5 5           PFM ed 5 10 10 ILU  10         Gait Training                                       Modalities

## 2022-10-06 ENCOUNTER — OFFICE VISIT (OUTPATIENT)
Dept: PHYSICAL THERAPY | Age: 42
End: 2022-10-06
Payer: COMMERCIAL

## 2022-10-06 DIAGNOSIS — M62.89 PELVIC FLOOR DYSFUNCTION: Primary | ICD-10-CM

## 2022-10-06 DIAGNOSIS — R10.2 PELVIC PAIN: ICD-10-CM

## 2022-10-06 PROCEDURE — 97110 THERAPEUTIC EXERCISES: CPT | Performed by: PHYSICAL THERAPIST

## 2022-10-06 PROCEDURE — 97140 MANUAL THERAPY 1/> REGIONS: CPT | Performed by: PHYSICAL THERAPIST

## 2022-10-13 ENCOUNTER — APPOINTMENT (OUTPATIENT)
Dept: PHYSICAL THERAPY | Age: 42
End: 2022-10-13

## 2022-10-20 ENCOUNTER — OFFICE VISIT (OUTPATIENT)
Dept: PHYSICAL THERAPY | Age: 42
End: 2022-10-20
Payer: COMMERCIAL

## 2022-10-20 DIAGNOSIS — M62.89 PELVIC FLOOR DYSFUNCTION: Primary | ICD-10-CM

## 2022-10-20 DIAGNOSIS — R10.2 PELVIC PAIN: ICD-10-CM

## 2022-10-20 PROCEDURE — 97140 MANUAL THERAPY 1/> REGIONS: CPT | Performed by: PHYSICAL THERAPIST

## 2022-10-20 PROCEDURE — 97110 THERAPEUTIC EXERCISES: CPT | Performed by: PHYSICAL THERAPIST

## 2022-10-20 NOTE — PROGRESS NOTES
Daily Note     Today's date: 10/20/2022  Patient name: Nelly Encinas  : 1980  MRN: [id number]  Referring provider: Chana Guerra MD  Dx:   Encounter Diagnosis     ICD-10-CM    1  Pelvic floor dysfunction  M62 89    2  Pelvic pain  R10 2        Start Time: 73  Stop Time:   Total time in clinic (min): 56 minutes    Subjective: Patient notes when urinating throbbing discomfort on left labia that was a one time occurrence  Notes menstrual cycle seems to be changing with period coming sooner than 28 days  Notes constipation also seems to be cyclical  Trying to determine food triggers and other triggers  Objective: See treatment diary below      Assessment: Tolerated treatment well  Patient would benefit from continued PT Deferred direct PFM MT due to period  Left psoas and flank tightness that eased with MT  Noted feeling better post treatment  Patient performing ILU massage at home with some help per subjective report  Plan: Continue per plan of care        Precautions: IBS, PTSD      Manuals 8/24 9/8 9/15 9/22 9/29 10/6 10/20      Direct PFM examination  15   ind  15 ext ind  15            15                                 Neuro Re-Ed                                                                                                        Ther Ex             LE ROM 8  eval 10  HEP 10 13 13 15 15      Piriformis/adductor stretch HEP verbal            Pelvic/julieth transverse plane  15 25 (MT) 25 25 20 20                   CRK   3"/10"  10x  HEP 5 5 5 5                                             Ther Activity             Squatty potty ed 5            Bladder health ed 5 5           PFM ed 5 10 10 ILU  10         Gait Training                                       Modalities

## 2022-10-24 DIAGNOSIS — J30.1 SEASONAL ALLERGIC RHINITIS DUE TO POLLEN: ICD-10-CM

## 2022-10-24 RX ORDER — FLUTICASONE PROPIONATE 50 MCG
SPRAY, SUSPENSION (ML) NASAL
Qty: 16 ML | Refills: 0 | Status: SHIPPED | OUTPATIENT
Start: 2022-10-24

## 2022-10-27 ENCOUNTER — OFFICE VISIT (OUTPATIENT)
Dept: PHYSICAL THERAPY | Age: 42
End: 2022-10-27
Payer: COMMERCIAL

## 2022-10-27 DIAGNOSIS — R10.2 PELVIC PAIN: Primary | ICD-10-CM

## 2022-10-27 DIAGNOSIS — M62.89 PELVIC FLOOR DYSFUNCTION: ICD-10-CM

## 2022-10-27 PROCEDURE — 97110 THERAPEUTIC EXERCISES: CPT | Performed by: PHYSICAL THERAPIST

## 2022-10-27 PROCEDURE — 97140 MANUAL THERAPY 1/> REGIONS: CPT | Performed by: PHYSICAL THERAPIST

## 2022-10-27 PROCEDURE — 97530 THERAPEUTIC ACTIVITIES: CPT | Performed by: PHYSICAL THERAPIST

## 2022-10-27 NOTE — PROGRESS NOTES
Daily Note     Today's date: 10/27/2022  Patient name: Glory Clark  : 1980  MRN: [id number]  Referring provider: Romayne Freed, MD  Dx:   Encounter Diagnosis     ICD-10-CM    1  Pelvic pain  R10 2    2  Pelvic floor dysfunction  M62 89        Start Time: 1300  Stop Time: 1355  Total time in clinic (min): 55 minutes    Subjective: Patient notes successful vaginal intercourse this week with improved relaxation and less discomfort  Mild soreness post but improved  Notes varying urgency  Improved Bm with ILU massage and use of squatty potty  Objective: See treatment diary below      Assessment: Tolerated treatment well  Patient would benefit from continued PT Reviewed urge suppression and bladder health this date  Good response to manual therapy  Functional spinal and hip ROM/mobility  Plan: Continue per plan of care        Precautions: IBS, PTSD      Manuals 8/24 9/8 9/15 9/22 9/29 10/6 10/20 10/27     Direct PFM examination  15   ind  15 ext ind  15 ind  15           15                                 Neuro Re-Ed                                                                                                        Ther Ex             LE ROM 8  eval 10  HEP 10 13 13 15 15 15     Piriformis/adductor stretch HEP verbal            Pelvic/julieth transverse plane  15 25 (MT) 25 25 20 20 20                  CRK   3"/10"  10x  HEP 5 5 5 5 5                                            Ther Activity             Squatty potty ed 5            Bladder health ed 5 5      8     PFM ed 5 10 10 ILU  10         Gait Training                                       Modalities

## 2022-11-11 ENCOUNTER — OFFICE VISIT (OUTPATIENT)
Dept: PHYSICAL THERAPY | Age: 42
End: 2022-11-11

## 2022-11-11 DIAGNOSIS — M62.89 PELVIC FLOOR DYSFUNCTION: Primary | ICD-10-CM

## 2022-11-11 DIAGNOSIS — R10.2 PELVIC PAIN: ICD-10-CM

## 2022-11-11 NOTE — PROGRESS NOTES
Daily Note     Today's date: 2022  Patient name: Benoit Ortega  : 1980  MRN: [id number]  Referring provider: Aylin Warren MD  Dx:   Encounter Diagnosis     ICD-10-CM    1  Pelvic floor dysfunction  M62 89    2  Pelvic pain  R10 2        Start Time: 1300  Stop Time: 1355  Total time in clinic (min): 55 minutes    Subjective: Patient notes having pain left flank that is related to lifting and reaching  Noted feeling discomfort left with driving  Notes release of stool at times with pushing with urination  Using double voiding with some success for more complete bladder emptying  Voiding better when not constipated  Objective: See treatment diary below      Assessment: Tolerated treatment well  Patient would benefit from continued PT Patient to see urogyn in two weeks  Added open book to HEP to address trunk rotation tightness and pain  HEP instruction this date  Verbalized and demonstrated understanding  Written handouts provided  Plan: Continue per plan of care        Precautions: IBS, PTSD      Manuals 8/24 9/8 9/15 9/22 9/29 10/6 10/20 10/27 11/11    Direct PFM examination  15   ind  15 ext ind  15 ind  15 ind  15          15                                 Neuro Re-Ed                                                                                                        Ther Ex             LE ROM 8  eval 10  HEP 10 13 13 15 15 15 15    Piriformis/adductor stretch HEP verbal            Pelvic/julieth transverse plane  15 25 (MT) 25 25 20 20 20 20                 CRK   3"/10"  10x  HEP 5 5 5 5 5 5    Open book         10x  HEP                              Ther Activity             Squatty potty ed 5            Bladder health ed 5 5      8     PFM ed 5 10 10 ILU  10         Gait Training                                       Modalities

## 2022-11-18 ENCOUNTER — OFFICE VISIT (OUTPATIENT)
Dept: PHYSICAL THERAPY | Age: 42
End: 2022-11-18

## 2022-11-18 DIAGNOSIS — R10.2 PELVIC PAIN: ICD-10-CM

## 2022-11-18 DIAGNOSIS — M62.89 PELVIC FLOOR DYSFUNCTION: Primary | ICD-10-CM
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
## 2022-11-22 DIAGNOSIS — J30.1 SEASONAL ALLERGIC RHINITIS DUE TO POLLEN: ICD-10-CM

## 2022-11-22 RX ORDER — FLUTICASONE PROPIONATE 50 MCG
SPRAY, SUSPENSION (ML) NASAL
Qty: 16 ML | Refills: 0 | Status: SHIPPED | OUTPATIENT
Start: 2022-11-22

## 2022-11-28 ENCOUNTER — TELEPHONE (OUTPATIENT)
Dept: OBGYN CLINIC | Facility: MEDICAL CENTER | Age: 42
End: 2022-11-28

## 2022-11-28 NOTE — TELEPHONE ENCOUNTER
I would recommend an office visit for this issue  Dr Agustín Crowley ordered some basic blood work in September, that has some hormonal testing and could be completed prior to the appt

## 2022-11-28 NOTE — TELEPHONE ENCOUNTER
----- Message from Jennifer Velásquez sent at 11/25/2022  7:35 AM EST -----  Regarding: FW: Hormones   Contact: [phone]    ----- Message -----  From: Morena Toledo  Sent: 11/23/2022   7:20 PM EST  To: [facility]  Subject: Hormones                                         Yes  It appears my hormones are imbalanced again  I had brown spotting between periods

## 2022-11-28 NOTE — TELEPHONE ENCOUNTER
Pt is requesting blood work script to check her hormone imbalance  Pt is trying to get pregnant  We made appt for Jan 10th but would like to have blood work done prior to appt

## 2022-12-01 NOTE — TELEPHONE ENCOUNTER
Please confirm the patient knows Dr Coco Still already ordered the blood work (blood count and TSH), it is in the system and should be drawn prior to her appt  We can also enter a GCCT off the urine and an US to be complete prior  Dr Coco Still will likely refer her back to me for the mid cycle spotting and I am happy to evaluate but no other "femal hormone labs" are indicated, just what is already in the system to start

## 2022-12-01 NOTE — PROGRESS NOTES
Daily Note     Today's date: 2022  Patient name: Yuliana Garcia  : 1980  MRN: [id number]  Referring provider: Darryle Fort, MD  Dx:   Encounter Diagnosis     ICD-10-CM    1  Pelvic pain  R10 2       2  Pelvic floor dysfunction  M62 89           Start Time: 5806  Stop Time: 1233  Total time in clinic (min): 58 minutes    Subjective: Notes she has been doing stretches at home  Saw Dr Velva Bence and was advised bladder diary and IC diet  Notes that she lacks energy to prepare food for herself at times  Notes varying urgency and is currently completing a bladder diary to monitor voids and fluid intake  Vaginal intercourse now without pain  Objective: See treatment diary below      Assessment: Tolerated treatment well  Patient to be seen by PF physical therapist closer to home  Discontinue PT at this facility  Reviewed bladder health and adequate hydration, stretching and PFM relaxation techniques  Continues with ILU colon massage and use of Squatty Potty for best defecation mechanics  PT goals partially met        Plan: Discharge PT       Precautions: IBS, PTSD      Manuals 12/2   9/22 9/29 10/6 10/20 10/27 11/11 11/18   Direct PFM examination ind  15    ind  15 ext ind  15 ind  15 ind  15 15         15                                 Neuro Re-Ed                                                                                                        Ther Ex             LE ROM 15    10 13 13 15 15 15 15 15   Piriformis/adductor stretch HEP            Pelvic/julieth transverse plane 15  25 (MT) 25 25 20 20 20 20 20                CRK 5  HEP  3"/10"  10x  HEP 5 5 5 5 5 5 5   Open book         10x  HEP                              Ther Activity             Squatty potty ed             Bladder health ed  5      8     PFM ed  10 10 ILU  10         Gait Training                                       Modalities

## 2022-12-02 ENCOUNTER — OFFICE VISIT (OUTPATIENT)
Dept: PHYSICAL THERAPY | Age: 42
End: 2022-12-02

## 2022-12-02 DIAGNOSIS — M62.89 PELVIC FLOOR DYSFUNCTION: ICD-10-CM

## 2022-12-02 DIAGNOSIS — R10.2 PELVIC PAIN: Primary | ICD-10-CM

## 2022-12-12 ENCOUNTER — OFFICE VISIT (OUTPATIENT)
Dept: INTERNAL MEDICINE CLINIC | Facility: CLINIC | Age: 42
End: 2022-12-12

## 2022-12-12 ENCOUNTER — APPOINTMENT (OUTPATIENT)
Dept: LAB | Facility: CLINIC | Age: 42
End: 2022-12-12

## 2022-12-12 VITALS
WEIGHT: 143.8 LBS | RESPIRATION RATE: 16 BRPM | OXYGEN SATURATION: 94 % | DIASTOLIC BLOOD PRESSURE: 74 MMHG | SYSTOLIC BLOOD PRESSURE: 118 MMHG | TEMPERATURE: 98.1 F | BODY MASS INDEX: 24.55 KG/M2 | HEIGHT: 64 IN | HEART RATE: 100 BPM

## 2022-12-12 DIAGNOSIS — H10.509 BLEPHAROCONJUNCTIVITIS, UNSPECIFIED BLEPHAROCONJUNCTIVITIS TYPE, UNSPECIFIED LATERALITY: ICD-10-CM

## 2022-12-12 DIAGNOSIS — N30.10 INTERSTITIAL CYSTITIS: ICD-10-CM

## 2022-12-12 DIAGNOSIS — R10.9 LEFT FLANK PAIN: ICD-10-CM

## 2022-12-12 DIAGNOSIS — M79.7 FIBROMYALGIA: ICD-10-CM

## 2022-12-12 DIAGNOSIS — K58.1 IRRITABLE BOWEL SYNDROME WITH CONSTIPATION: Primary | ICD-10-CM

## 2022-12-12 DIAGNOSIS — F43.10 PTSD (POST-TRAUMATIC STRESS DISORDER): ICD-10-CM

## 2022-12-12 DIAGNOSIS — L29.0 PERIANAL ITCH: ICD-10-CM

## 2022-12-12 DIAGNOSIS — N39.41 URGE INCONTINENCE OF URINE: ICD-10-CM

## 2022-12-12 RX ORDER — CLOTRIMAZOLE AND BETAMETHASONE DIPROPIONATE 10; .64 MG/G; MG/G
CREAM TOPICAL 2 TIMES DAILY PRN
Qty: 30 G | Refills: 0 | Status: SHIPPED | OUTPATIENT
Start: 2022-12-12

## 2022-12-12 NOTE — PROGRESS NOTES
Assessment/Plan:    Diagnoses and all orders for this visit:    Irritable bowel syndrome with constipation    Perianal itch  -     clotrimazole-betamethasone (LOTRISONE) 1-0 05 % cream; Apply topically 2 (two) times a day as needed (itching)    Fibromyalgia    PTSD (post-traumatic stress disorder)    Urge incontinence of urine    Interstitial cystitis    Blepharoconjunctivitis, unspecified blepharoconjunctivitis type, unspecified laterality    Left flank pain  -     CT abdomen pelvis wo contrast; Future  -     CBC and differential; Future  -     Comprehensive metabolic panel; Future  -     UA w Reflex to Microscopic w Reflex to Culture  -     Pregnancy Test (HCG Qualitative); Future  -     Urine Microscopic            Patient Instructions   Left flank pain and left lower quadrant tenderness to palpation-check labs and stat CT scan and follow accordingly  Normal: Reviewed from 2019  Continue with urogynecology and pelvic PT    Continue dietary modification for interstitial cystitis  Refill Lotrisone for as needed use        Subjective:      Patient ID: Radha Martin is a 40 y o  female    F/u MMP  Seeing pelvic PT and doing much better w/ both urination and bm  Better w/ squatty potty and increasing fluid  Also following w/ Dr Georgina Jeffery  Working on interstitial cystitis diet  Perineal rash has improved significantly w/ epsom salt baths, maricel oil, oregano oil as well as lotrisone prn, needs refill  Notes R flank pain x last several weeks, worse w/ movement or pressure  No change bowel or bladder  Has remote h/o blood/mucus in stool  No f/c/n/v  [facility] Dr Eric Watson for blepharitis and conjunctivitis  Had several drops, then abx and steroids w/ some improvement  Then she got some blurred vision  No f/u scheduled  Working w/ therapistMonica weekly  Working on a plan to move out on her own     Meditating            Current Outpatient Medications:   •  albuterol (PROVENTIL HFA,VENTOLIN HFA) 90 mcg/act inhaler, Inhale, Disp: , Rfl:   •  Cholecalciferol 50 MCG (2000 UT) CAPS, Take 3 capsules by mouth 61640 units daily liquid, Disp: , Rfl:   •  clotrimazole-betamethasone (LOTRISONE) 1-0 05 % cream, Apply topically 2 (two) times a day as needed (itching), Disp: 30 g, Rfl: 0  •  fluticasone (FLONASE) 50 mcg/act nasal spray, INSTILL 1 SPRAY INTO EACH NOSTRIL 2 (TWO) TIMES A DAY WITH MEALS, Disp: 16 mL, Rfl: 0  •  zinc gluconate 50 mg tablet, Take 50 mg by mouth daily, Disp: , Rfl:   •  levocetirizine (XYZAL) 5 MG tablet, Take 5 mg by mouth every evening (Patient not taking: Reported on 12/12/2022), Disp: , Rfl:   •  Probiotic Product (PROBIOTIC ACIDOPHILUS BEADS PO), Take by mouth (Patient not taking: Reported on 12/12/2022), Disp: , Rfl:     Recent Results (from the past 1008 hour(s))   UA w Reflex to Microscopic w Reflex to Culture    Collection Time: 12/12/22  5:18 PM    Specimen: Urine   Result Value Ref Range    Color, UA Light Yellow     Clarity, UA Turbid     Specific [place], UA 1 026 1 003 - 1 030    pH, UA 5 5 4 5, 5 0, 5 5, 6 0, 6 5, 7 0, 7 5, 8 0    Leukocytes, UA Negative Negative    Nitrite, UA Negative Negative    Protein, UA Trace (A) Negative mg/dl    Glucose, UA Negative Negative mg/dl    Ketones, UA 20 (1+) (A) Negative mg/dl    Urobilinogen, UA <2 0 <2 0 mg/dl mg/dl    Bilirubin, UA Negative Negative    Occult Blood, UA Negative Negative   CBC and differential    Collection Time: 12/12/22  5:18 PM   Result Value Ref Range    WBC 6 23 4 31 - 10 16 Thousand/uL    RBC 4 84 3 81 - 5 12 Million/uL    Hemoglobin 12 8 11 5 - 15 4 g/dL    Hematocrit 41 8 34 8 - 46 1 %    MCV 86 82 - 98 fL    MCH 26 4 (L) 26 8 - 34 3 pg    MCHC 30 6 (L) 31 4 - 37 4 g/dL    RDW 13 5 11 6 - 15 1 %    MPV 11 6 8 9 - 12 7 fL    Platelets 274 819 - 115 Thousands/uL    nRBC 0 /100 WBCs    Neutrophils Relative 53 43 - 75 %    Immat GRANS % 0 0 - 2 %    Lymphocytes Relative 37 14 - 44 % Monocytes Relative 8 4 - 12 %    Eosinophils Relative 1 0 - 6 %    Basophils Relative 1 0 - 1 %    Neutrophils Absolute 3 34 1 85 - 7 62 Thousands/µL    Immature Grans Absolute 0 01 0 00 - 0 20 Thousand/uL    Lymphocytes Absolute 2 31 0 60 - 4 47 Thousands/µL    Monocytes Absolute 0 50 0 17 - 1 22 Thousand/µL    Eosinophils Absolute 0 03 0 00 - 0 61 Thousand/µL    Basophils Absolute 0 04 0 00 - 0 10 Thousands/µL   Comprehensive metabolic panel    Collection Time: 12/12/22  5:18 PM   Result Value Ref Range    Sodium 138 135 - 147 mmol/L    Potassium 3 2 (L) 3 5 - 5 3 mmol/L    Chloride 111 (H) 96 - 108 mmol/L    CO2 21 21 - 32 mmol/L    ANION GAP 6 4 - 13 mmol/L    BUN 13 5 - 25 mg/dL    Creatinine 0 79 0 60 - 1 30 mg/dL    Glucose, Fasting 85 65 - 99 mg/dL    Calcium 9 3 8 3 - 10 1 mg/dL    AST 10 5 - 45 U/L    ALT 15 12 - 78 U/L    Alkaline Phosphatase 71 46 - 116 U/L    Total Protein 7 4 6 4 - 8 4 g/dL    Albumin 4 1 3 5 - 5 0 g/dL    Total Bilirubin 0 41 0 20 - 1 00 mg/dL    eGFR 93 ml/min/1 73sq m   Pregnancy Test (HCG Qualitative)    Collection Time: 12/12/22  5:18 PM   Result Value Ref Range    Preg, Serum Negative Negative   Urine Microscopic    Collection Time: 12/12/22  5:18 PM   Result Value Ref Range    RBC, UA 2-4 (A) None Seen, 1-2 /hpf    WBC, UA 1-2 None Seen, 1-2 /hpf    Epithelial Cells Occasional None Seen, Occasional /hpf    Bacteria, UA Occasional None Seen, Occasional /hpf    MUCUS THREADS Innumerable (A) None Seen    Ca Oxalate Joanne, UA Innumerable (A) None Seen /hpf       The following portions of the patient's history were reviewed and updated as appropriate: allergies, current medications, past family history, past medical history, past social history, past surgical history and problem list      Review of Systems   Constitutional: Negative for appetite change, chills, diaphoresis, fatigue, fever and unexpected weight change  HENT: Negative for congestion, hearing loss and rhinorrhea  Eyes: Positive for redness  Negative for visual disturbance  Respiratory: Negative for cough, chest tightness, shortness of breath and wheezing  Cardiovascular: Negative for chest pain, palpitations and leg swelling  Gastrointestinal: Positive for abdominal pain  Negative for blood in stool  Endocrine: Negative for cold intolerance, heat intolerance, polydipsia and polyuria  Genitourinary: Negative for difficulty urinating, dysuria, frequency and urgency  Musculoskeletal: Positive for arthralgias  Negative for myalgias  Skin: Negative for rash  Neurological: Negative for dizziness, weakness, light-headedness and headaches  Hematological: Does not bruise/bleed easily  Psychiatric/Behavioral: Negative for dysphoric mood and sleep disturbance  Objective:      Vitals:    12/12/22 1624   BP: 118/74   Pulse: 100   Resp: 16   Temp: 98 1 °F (36 7 °C)   SpO2: 94%          Physical Exam  Constitutional:       Appearance: She is well-developed  HENT:      Head: Normocephalic and atraumatic  Nose: Nose normal    Eyes:      General: No scleral icterus  Pupils: Pupils are equal, round, and reactive to light  Comments: Sclera injected   Neck:      Thyroid: No thyromegaly  Vascular: No JVD  Trachea: No tracheal deviation  Cardiovascular:      Rate and Rhythm: Normal rate and regular rhythm  Heart sounds: No murmur heard  No friction rub  No gallop  Pulmonary:      Effort: Pulmonary effort is normal  No respiratory distress  Breath sounds: Normal breath sounds  No wheezing or rales  Abdominal:      General: Bowel sounds are normal  There is no distension  Palpations: Abdomen is soft  There is no mass  Tenderness: There is abdominal tenderness  There is left CVA tenderness  There is no guarding or rebound        Comments: Gait is exquisitely tender to palpate, contiguous with left-sided tenderness predominantly left lower quadrant but extending to the mid left quadrant as well without splenomegaly, mass or rebound  There is considerable guarding  Musculoskeletal:         General: No tenderness  Cervical back: Normal range of motion and neck supple  Lymphadenopathy:      Cervical: No cervical adenopathy  Skin:     General: Skin is warm and dry  Findings: No erythema or rash  Neurological:      Mental Status: She is alert and oriented to person, place, and time  Cranial Nerves: No cranial nerve deficit  Psychiatric:         Behavior: Behavior normal          Thought Content:  Thought content normal          Judgment: Judgment normal

## 2022-12-12 NOTE — PATIENT INSTRUCTIONS
Left flank pain and left lower quadrant tenderness to palpation-check labs and stat CT scan and follow accordingly  Normal: Reviewed from 2019  Continue with urogynecology and pelvic PT    Continue dietary modification for interstitial cystitis      Refill Lotrisone for as needed use

## 2022-12-13 ENCOUNTER — TELEPHONE (OUTPATIENT)
Dept: INTERNAL MEDICINE CLINIC | Facility: CLINIC | Age: 42
End: 2022-12-13

## 2022-12-13 ENCOUNTER — HOSPITAL ENCOUNTER (OUTPATIENT)
Dept: CT IMAGING | Facility: HOSPITAL | Age: 42
Discharge: HOME | End: 2022-12-13
Attending: INTERNAL MEDICINE

## 2022-12-13 DIAGNOSIS — E87.6 HYPOKALEMIA: Primary | ICD-10-CM

## 2022-12-13 DIAGNOSIS — R10.9 LEFT FLANK PAIN: ICD-10-CM

## 2022-12-13 LAB
ALBUMIN SERPL BCP-MCNC: 4.1 G/DL (ref 3.5–5)
ALP SERPL-CCNC: 71 U/L (ref 46–116)
ALT SERPL W P-5'-P-CCNC: 15 U/L (ref 12–78)
ANION GAP SERPL CALCULATED.3IONS-SCNC: 6 MMOL/L (ref 4–13)
AST SERPL W P-5'-P-CCNC: 10 U/L (ref 5–45)
BACTERIA UR QL AUTO: ABNORMAL /HPF
BASOPHILS # BLD AUTO: 0.04 THOUSANDS/ÂΜL (ref 0–0.1)
BASOPHILS NFR BLD AUTO: 1 % (ref 0–1)
BILIRUB SERPL-MCNC: 0.41 MG/DL (ref 0.2–1)
BILIRUB UR QL STRIP: NEGATIVE
BUN SERPL-MCNC: 13 MG/DL (ref 5–25)
CALCIUM SERPL-MCNC: 9.3 MG/DL (ref 8.3–10.1)
CAOX CRY URNS QL MICRO: ABNORMAL /HPF
CHLORIDE SERPL-SCNC: 111 MMOL/L (ref 96–108)
CLARITY UR: ABNORMAL
CO2 SERPL-SCNC: 21 MMOL/L (ref 21–32)
COLOR UR: ABNORMAL
CREAT SERPL-MCNC: 0.79 MG/DL (ref 0.6–1.3)
EOSINOPHIL # BLD AUTO: 0.03 THOUSAND/ÂΜL (ref 0–0.61)
EOSINOPHIL NFR BLD AUTO: 1 % (ref 0–6)
ERYTHROCYTE [DISTWIDTH] IN BLOOD BY AUTOMATED COUNT: 13.5 % (ref 11.6–15.1)
GFR SERPL CREATININE-BSD FRML MDRD: 93 ML/MIN/1.73SQ M
GLUCOSE P FAST SERPL-MCNC: 85 MG/DL (ref 65–99)
GLUCOSE UR STRIP-MCNC: NEGATIVE MG/DL
HCG SERPL QL: NEGATIVE
HCT VFR BLD AUTO: 41.8 % (ref 34.8–46.1)
HGB BLD-MCNC: 12.8 G/DL (ref 11.5–15.4)
HGB UR QL STRIP.AUTO: NEGATIVE
IMM GRANULOCYTES # BLD AUTO: 0.01 THOUSAND/UL (ref 0–0.2)
IMM GRANULOCYTES NFR BLD AUTO: 0 % (ref 0–2)
KETONES UR STRIP-MCNC: ABNORMAL MG/DL
LEUKOCYTE ESTERASE UR QL STRIP: NEGATIVE
LYMPHOCYTES # BLD AUTO: 2.31 THOUSANDS/ÂΜL (ref 0.6–4.47)
LYMPHOCYTES NFR BLD AUTO: 37 % (ref 14–44)
MCH RBC QN AUTO: 26.4 PG (ref 26.8–34.3)
MCHC RBC AUTO-ENTMCNC: 30.6 G/DL (ref 31.4–37.4)
MCV RBC AUTO: 86 FL (ref 82–98)
MONOCYTES # BLD AUTO: 0.5 THOUSAND/ÂΜL (ref 0.17–1.22)
MONOCYTES NFR BLD AUTO: 8 % (ref 4–12)
MUCOUS THREADS UR QL AUTO: ABNORMAL
NEUTROPHILS # BLD AUTO: 3.34 THOUSANDS/ÂΜL (ref 1.85–7.62)
NEUTS SEG NFR BLD AUTO: 53 % (ref 43–75)
NITRITE UR QL STRIP: NEGATIVE
NON-SQ EPI CELLS URNS QL MICRO: ABNORMAL /HPF
NRBC BLD AUTO-RTO: 0 /100 WBCS
PH UR STRIP.AUTO: 5.5 [PH]
PLATELET # BLD AUTO: 283 THOUSANDS/UL (ref 149–390)
PMV BLD AUTO: 11.6 FL (ref 8.9–12.7)
POTASSIUM SERPL-SCNC: 3.2 MMOL/L (ref 3.5–5.3)
PROT SERPL-MCNC: 7.4 G/DL (ref 6.4–8.4)
PROT UR STRIP-MCNC: ABNORMAL MG/DL
RBC # BLD AUTO: 4.84 MILLION/UL (ref 3.81–5.12)
RBC #/AREA URNS AUTO: ABNORMAL /HPF
SODIUM SERPL-SCNC: 138 MMOL/L (ref 135–147)
SP GR UR STRIP.AUTO: 1.03 (ref 1–1.03)
UROBILINOGEN UR STRIP-ACNC: <2 MG/DL
WBC # BLD AUTO: 6.23 THOUSAND/UL (ref 4.31–10.16)
WBC #/AREA URNS AUTO: ABNORMAL /HPF

## 2022-12-13 NOTE — TELEPHONE ENCOUNTER
----- Message from Criss Spann MD sent at 12/13/2022  3:51 PM EST -----  Notify- CT is negative  Labs are unremarkable except for potassium 3 4    Increase potassium in diet and recheck labs in 1 week

## 2022-12-13 NOTE — TELEPHONE ENCOUNTER
Message for Dr Henry Linares are needed to obtain an approval for the STAT CT Abd/Pelvis w/o ordered today by Dr Afua Cast    T#: [id number]  Site: [facility]     Let me know when the office note is complete

## 2022-12-16 ENCOUNTER — APPOINTMENT (OUTPATIENT)
Dept: PHYSICAL THERAPY | Age: 42
End: 2022-12-16

## 2022-12-20 DIAGNOSIS — J30.1 SEASONAL ALLERGIC RHINITIS DUE TO POLLEN: ICD-10-CM

## 2022-12-20 RX ORDER — FLUTICASONE PROPIONATE 50 MCG
SPRAY, SUSPENSION (ML) NASAL
Qty: 16 ML | Refills: 0 | Status: SHIPPED | OUTPATIENT
Start: 2022-12-20

## 2022-12-21 ENCOUNTER — APPOINTMENT (OUTPATIENT)
Dept: LAB | Facility: CLINIC | Age: 42
End: 2022-12-21

## 2022-12-21 DIAGNOSIS — E87.6 HYPOKALEMIA: ICD-10-CM

## 2022-12-21 LAB
ANION GAP SERPL CALCULATED.3IONS-SCNC: 4 MMOL/L (ref 4–13)
BUN SERPL-MCNC: 14 MG/DL (ref 5–25)
CALCIUM SERPL-MCNC: 8.9 MG/DL (ref 8.3–10.1)
CHLORIDE SERPL-SCNC: 110 MMOL/L (ref 96–108)
CO2 SERPL-SCNC: 25 MMOL/L (ref 21–32)
CREAT SERPL-MCNC: 0.82 MG/DL (ref 0.6–1.3)
GFR SERPL CREATININE-BSD FRML MDRD: 89 ML/MIN/1.73SQ M
GLUCOSE SERPL-MCNC: 88 MG/DL (ref 65–140)
MAGNESIUM SERPL-MCNC: 2.1 MG/DL (ref 1.6–2.6)
POTASSIUM SERPL-SCNC: 3.5 MMOL/L (ref 3.5–5.3)
SODIUM SERPL-SCNC: 139 MMOL/L (ref 135–147)

## 2022-12-23 ENCOUNTER — APPOINTMENT (OUTPATIENT)
Dept: PHYSICAL THERAPY | Age: 42
End: 2022-12-23

## 2022-12-29 ENCOUNTER — APPOINTMENT (OUTPATIENT)
Dept: PHYSICAL THERAPY | Age: 42
End: 2022-12-29

## 2023-01-16 ENCOUNTER — OFFICE VISIT (OUTPATIENT)
Dept: INTERNAL MEDICINE CLINIC | Facility: CLINIC | Age: 43
End: 2023-01-16

## 2023-01-16 VITALS
BODY MASS INDEX: 25.44 KG/M2 | HEIGHT: 64 IN | WEIGHT: 149 LBS | RESPIRATION RATE: 20 BRPM | OXYGEN SATURATION: 98 % | HEART RATE: 88 BPM | SYSTOLIC BLOOD PRESSURE: 90 MMHG | DIASTOLIC BLOOD PRESSURE: 50 MMHG | TEMPERATURE: 97.6 F

## 2023-01-16 DIAGNOSIS — R10.32 LEFT INGUINAL PAIN: ICD-10-CM

## 2023-01-16 DIAGNOSIS — K57.90 DIVERTICULOSIS: ICD-10-CM

## 2023-01-16 DIAGNOSIS — J30.1 SEASONAL ALLERGIC RHINITIS DUE TO POLLEN: ICD-10-CM

## 2023-01-16 DIAGNOSIS — R10.9 FLANK PAIN: Primary | ICD-10-CM

## 2023-01-16 RX ORDER — FLUTICASONE PROPIONATE 50 MCG
SPRAY, SUSPENSION (ML) NASAL
Qty: 16 ML | Refills: 0 | Status: SHIPPED | OUTPATIENT
Start: 2023-01-16

## 2023-01-16 NOTE — PATIENT INSTRUCTIONS
Abdominal pain and flank pain historically most consistent with musculoskeletal, however I cannot rule out constipation as a contributing factor  CT reviewed in detail, no evidence for any herniation or acute pathology  No evidence for kidney stones  Left inguinal pain consistent with hip flexor tendinitis-refer to physical therapy for both complaints  Diverticulosis-continue high-fiber diet with daily fiber supplement such as Metamucil  Okay to use MiraLAX and or castor oil if needed for ongoing constipation

## 2023-01-16 NOTE — PROGRESS NOTES
Assessment/Plan:    Diagnoses and all orders for this visit:    Flank pain  -     Ambulatory Referral to Physical Therapy; Future    Left inguinal pain  -     Ambulatory Referral to Physical Therapy; Future    Diverticulosis    Other orders  -     Bioflavonoid Products (Suyapa-C) 500-550 MG TABS; Take 1,000 mg by mouth            Patient Instructions   Abdominal pain and flank pain historically most consistent with musculoskeletal, however I cannot rule out constipation as a contributing factor  CT reviewed in detail, no evidence for any herniation or acute pathology  No evidence for kidney stones  Left inguinal pain consistent with hip flexor tendinitis-refer to physical therapy for both complaints  Diverticulosis-continue high-fiber diet with daily fiber supplement such as Metamucil  Okay to use MiraLAX and or castor oil if needed for ongoing constipation  Subjective:      Patient ID: Pao Ramirez is a 40 y o  female    F/u left flank pain, concerned w/ hernia  Pain worse w/ movement  CT in Dec was unremarkable  No change w/ or w/o bm  Seeing pelvic PT and doing much better w/ both urination and bm  Better w/ squatty potty and increasing fluid  Also following w/ Dr Yassine Wylie  Working on interstitial cystitis diet  Working w/ therapist, Dejuan Nagy weekly  Working on a plan to move out on her own   Meditating            Current Outpatient Medications:   •  albuterol (PROVENTIL HFA,VENTOLIN HFA) 90 mcg/act inhaler, Inhale, Disp: , Rfl:   •  Bioflavonoid Products (Suyapa-C) 500-550 MG TABS, Take 1,000 mg by mouth, Disp: , Rfl:   •  Cholecalciferol 50 MCG (2000 UT) CAPS, Take 3 capsules by mouth 08580 units daily liquid, Disp: , Rfl:   •  clotrimazole-betamethasone (LOTRISONE) 1-0 05 % cream, Apply topically 2 (two) times a day as needed (itching), Disp: 30 g, Rfl: 0  •  fluticasone (FLONASE) 50 mcg/act nasal spray, INSTILL 1 SPRAY INTO EACH NOSTRIL 2 (TWO) TIMES A DAY WITH MEALS, Disp: 16 mL, Rfl: 0  •  levocetirizine (XYZAL) 5 MG tablet, Take 5 mg by mouth every evening, Disp: , Rfl:   •  Probiotic Product (PROBIOTIC ACIDOPHILUS BEADS PO), Take by mouth, Disp: , Rfl:   •  zinc gluconate 50 mg tablet, Take 50 mg by mouth daily, Disp: , Rfl:     Recent Results (from the past 1008 hour(s))   UA w Reflex to Microscopic w Reflex to Culture    Collection Time: 12/12/22  5:18 PM    Specimen: Urine   Result Value Ref Range    Color, UA Light Yellow     Clarity, UA Turbid     Specific [place], UA 1 026 1 003 - 1 030    pH, UA 5 5 4 5, 5 0, 5 5, 6 0, 6 5, 7 0, 7 5, 8 0    Leukocytes, UA Negative Negative    Nitrite, UA Negative Negative    Protein, UA Trace (A) Negative mg/dl    Glucose, UA Negative Negative mg/dl    Ketones, UA 20 (1+) (A) Negative mg/dl    Urobilinogen, UA <2 0 <2 0 mg/dl mg/dl    Bilirubin, UA Negative Negative    Occult Blood, UA Negative Negative   CBC and differential    Collection Time: 12/12/22  5:18 PM   Result Value Ref Range    WBC 6 23 4 31 - 10 16 Thousand/uL    RBC 4 84 3 81 - 5 12 Million/uL    Hemoglobin 12 8 11 5 - 15 4 g/dL    Hematocrit 41 8 34 8 - 46 1 %    MCV 86 82 - 98 fL    MCH 26 4 (L) 26 8 - 34 3 pg    MCHC 30 6 (L) 31 4 - 37 4 g/dL    RDW 13 5 11 6 - 15 1 %    MPV 11 6 8 9 - 12 7 fL    Platelets 178 975 - 833 Thousands/uL    nRBC 0 /100 WBCs    Neutrophils Relative 53 43 - 75 %    Immat GRANS % 0 0 - 2 %    Lymphocytes Relative 37 14 - 44 %    Monocytes Relative 8 4 - 12 %    Eosinophils Relative 1 0 - 6 %    Basophils Relative 1 0 - 1 %    Neutrophils Absolute 3 34 1 85 - 7 62 Thousands/µL    Immature Grans Absolute 0 01 0 00 - 0 20 Thousand/uL    Lymphocytes Absolute 2 31 0 60 - 4 47 Thousands/µL    Monocytes Absolute 0 50 0 17 - 1 22 Thousand/µL    Eosinophils Absolute 0 03 0 00 - 0 61 Thousand/µL    Basophils Absolute 0 04 0 00 - 0 10 Thousands/µL   Comprehensive metabolic panel    Collection Time: 12/12/22  5:18 PM   Result Value Ref Range    Sodium 138 135 - 147 mmol/L    Potassium 3 2 (L) 3 5 - 5 3 mmol/L    Chloride 111 (H) 96 - 108 mmol/L    CO2 21 21 - 32 mmol/L    ANION GAP 6 4 - 13 mmol/L    BUN 13 5 - 25 mg/dL    Creatinine 0 79 0 60 - 1 30 mg/dL    Glucose, Fasting 85 65 - 99 mg/dL    Calcium 9 3 8 3 - 10 1 mg/dL    AST 10 5 - 45 U/L    ALT 15 12 - 78 U/L    Alkaline Phosphatase 71 46 - 116 U/L    Total Protein 7 4 6 4 - 8 4 g/dL    Albumin 4 1 3 5 - 5 0 g/dL    Total Bilirubin 0 41 0 20 - 1 00 mg/dL    eGFR 93 ml/min/1 73sq m   Pregnancy Test (HCG Qualitative)    Collection Time: 12/12/22  5:18 PM   Result Value Ref Range    Preg, Serum Negative Negative   Urine Microscopic    Collection Time: 12/12/22  5:18 PM   Result Value Ref Range    RBC, UA 2-4 (A) None Seen, 1-2 /hpf    WBC, UA 1-2 None Seen, 1-2 /hpf    Epithelial Cells Occasional None Seen, Occasional /hpf    Bacteria, UA Occasional None Seen, Occasional /hpf    MUCUS THREADS Innumerable (A) None Seen    Ca Oxalate Joanne, UA Innumerable (A) None Seen /hpf   Basic metabolic panel    Collection Time: 12/21/22 10:57 AM   Result Value Ref Range    Sodium 139 135 - 147 mmol/L    Potassium 3 5 3 5 - 5 3 mmol/L    Chloride 110 (H) 96 - 108 mmol/L    CO2 25 21 - 32 mmol/L    ANION GAP 4 4 - 13 mmol/L    BUN 14 5 - 25 mg/dL    Creatinine 0 82 0 60 - 1 30 mg/dL    Glucose 88 65 - 140 mg/dL    Calcium 8 9 8 3 - 10 1 mg/dL    eGFR 89 ml/min/1 73sq m   Magnesium    Collection Time: 12/21/22 10:57 AM   Result Value Ref Range    Magnesium 2 1 1 6 - 2 6 mg/dL       The following portions of the patient's history were reviewed and updated as appropriate: allergies, current medications, past family history, past medical history, past social history, past surgical history and problem list      Review of Systems   Constitutional: Negative for appetite change, chills, diaphoresis, fatigue, fever and unexpected weight change  HENT: Negative for congestion, hearing loss and rhinorrhea      Eyes: Negative for visual disturbance  Respiratory: Negative for cough, chest tightness, shortness of breath and wheezing  Cardiovascular: Negative for chest pain, palpitations and leg swelling  Gastrointestinal: Positive for abdominal pain and constipation  Negative for blood in stool  Endocrine: Negative for cold intolerance, heat intolerance, polydipsia and polyuria  Genitourinary: Negative for difficulty urinating, dysuria, frequency and urgency  Musculoskeletal: Positive for myalgias  Negative for arthralgias  Skin: Negative for rash  Neurological: Negative for dizziness, weakness, light-headedness and headaches  Hematological: Does not bruise/bleed easily  Psychiatric/Behavioral: Negative for dysphoric mood and sleep disturbance  Objective:      Vitals:    01/16/23 1756   BP: 90/50   Pulse: 88   Resp: 20   Temp: 97 6 °F (36 4 °C)   SpO2: 98%          Physical Exam  Constitutional:       Appearance: She is well-developed  HENT:      Head: Normocephalic and atraumatic  Nose: Nose normal    Eyes:      General: No scleral icterus  Conjunctiva/sclera: Conjunctivae normal       Pupils: Pupils are equal, round, and reactive to light  Neck:      Thyroid: No thyromegaly  Vascular: No JVD  Trachea: No tracheal deviation  Cardiovascular:      Rate and Rhythm: Normal rate and regular rhythm  Heart sounds: No murmur heard  No friction rub  No gallop  Pulmonary:      Effort: Pulmonary effort is normal  No respiratory distress  Breath sounds: Normal breath sounds  No wheezing or rales  Abdominal:      General: Bowel sounds are normal  There is no distension  Palpations: Abdomen is soft  There is no mass  Tenderness: There is abdominal tenderness  There is no guarding or rebound  Comments: Tender to palpate left lower quadrant and left flank   Musculoskeletal:         General: No tenderness  Cervical back: Normal range of motion and neck supple        Comments: Tender to palpate left inguinal region made worse with straight leg raise against resistance   Lymphadenopathy:      Cervical: No cervical adenopathy  Skin:     General: Skin is warm and dry  Findings: No erythema or rash  Neurological:      Mental Status: She is alert and oriented to person, place, and time  Cranial Nerves: No cranial nerve deficit  Psychiatric:         Behavior: Behavior normal          Thought Content:  Thought content normal          Judgment: Judgment normal

## 2023-02-07 ENCOUNTER — ANNUAL EXAM (OUTPATIENT)
Dept: OBGYN CLINIC | Facility: CLINIC | Age: 43
End: 2023-02-07

## 2023-02-07 VITALS
DIASTOLIC BLOOD PRESSURE: 64 MMHG | WEIGHT: 139.4 LBS | BODY MASS INDEX: 23.22 KG/M2 | SYSTOLIC BLOOD PRESSURE: 112 MMHG | HEIGHT: 65 IN

## 2023-02-07 DIAGNOSIS — Z31.69 ENCOUNTER FOR PRECONCEPTION CONSULTATION: ICD-10-CM

## 2023-02-07 DIAGNOSIS — Z11.51 SCREENING FOR HPV (HUMAN PAPILLOMAVIRUS): ICD-10-CM

## 2023-02-07 DIAGNOSIS — R10.2 PERINEAL PAIN: ICD-10-CM

## 2023-02-07 DIAGNOSIS — Z87.42 HISTORY OF PID: ICD-10-CM

## 2023-02-07 DIAGNOSIS — Z12.31 ENCOUNTER FOR SCREENING MAMMOGRAM FOR MALIGNANT NEOPLASM OF BREAST: ICD-10-CM

## 2023-02-07 DIAGNOSIS — Z01.419 ENCOUNTER FOR ANNUAL ROUTINE GYNECOLOGICAL EXAMINATION: ICD-10-CM

## 2023-02-07 DIAGNOSIS — Z01.419 ENCOUNTER FOR GYNECOLOGICAL EXAMINATION (GENERAL) (ROUTINE) WITHOUT ABNORMAL FINDINGS: Primary | ICD-10-CM

## 2023-02-07 DIAGNOSIS — Z86.19 HISTORY OF HPV INFECTION: ICD-10-CM

## 2023-02-07 DIAGNOSIS — Z32.02 NEGATIVE PREGNANCY TEST: ICD-10-CM

## 2023-02-07 DIAGNOSIS — N73.9 PELVIC INFLAMMATORY DISEASE: ICD-10-CM

## 2023-02-07 LAB — SL AMB POCT URINE HCG: NORMAL

## 2023-02-07 NOTE — ASSESSMENT & PLAN NOTE
41 yo here for annual exam    Pap cotest collected, history elsewhere  Mammo due September   Recommend breast self awareness  Healthy diet and exercise  Sexually active, would like to conceive, advised PNV

## 2023-02-07 NOTE — PROGRESS NOTES
Assessment/Plan:    Encounter for annual routine gynecological examination  41 yo here for annual exam    Pap cotest collected, history elsewhere  Mammo due September  Recommend breast self awareness  Healthy diet and exercise  Sexually active, would like to conceive, advised PNV    History of HPV infection  Intermittent condom use  Nonsmoker  Need to review HPV catch up    3/21 NILM HPV other pos  3/22 NILM HPV other pos  4/22 colpo CIN1  2/23 cotest collected    Encounter for preconception consultation  Trying for over 1 year  Advised given age seek fertility care    Pelvic inflammatory disease  Testing today routine    Perineal pain  Requests referral back to PFPT       Diagnoses and all orders for this visit:    Encounter for gynecological examination (general) (routine) without abnormal findings  -     Liquid-based pap, screening    Screening for HPV (human papillomavirus)  -     Liquid-based pap, screening    Encounter for screening mammogram for malignant neoplasm of breast  -     Mammo screening bilateral w 3d & cad; Future    Encounter for annual routine gynecological examination    History of HPV infection    Encounter for preconception consultation    Perineal pain  -     Ambulatory Referral to Physical Therapy; Future    Pelvic inflammatory disease    History of PID  -     Chlamydia/GC amplified DNA by PCR          Subjective:      Patient ID: Jenny Pierce is a 40 y o  female  41 yo here for annual exam  LMP January  Request UPT today negative  Sexually active, occasional condom use, would like to have a pregnancy- has intermittently been trying for >1 year  She has had good success with dietary changes and PFPT for her pelvic and perineal pain        The following portions of the patient's history were reviewed and updated as appropriate: allergies, current medications, past family history, past medical history, past social history, past surgical history and problem list     Review of Systems Constitutional: Negative for chills and fever  HENT: Negative for ear pain and sore throat  Eyes: Negative for pain and visual disturbance  Respiratory: Negative for cough and shortness of breath  Cardiovascular: Negative for chest pain and palpitations  Gastrointestinal: Negative for abdominal pain, constipation, diarrhea, nausea and vomiting  Genitourinary: Positive for pelvic pain and vaginal pain  Negative for dyspareunia, dysuria, frequency, hematuria, urgency, vaginal bleeding and vaginal discharge  Musculoskeletal: Negative for arthralgias and back pain  Skin: Negative for color change and rash  Neurological: Negative for seizures and syncope  All other systems reviewed and are negative  Objective:      /64 (BP Location: Left arm, Patient Position: Sitting, Cuff Size: Adult)   Ht 5' 4 5" (1 638 m)   Wt 63 2 kg (139 lb 6 4 oz)   LMP 01/16/2023 (Approximate)   BMI 23 56 kg/m²          Physical Exam  Constitutional:       General: She is not in acute distress  Appearance: She is well-developed  She is not diaphoretic  HENT:      Head: Normocephalic and atraumatic  Neck:      Thyroid: No thyromegaly  Pulmonary:      Effort: Pulmonary effort is normal    Chest:   Breasts:     Breasts are symmetrical       Right: No inverted nipple, mass, nipple discharge, skin change or tenderness  Left: No inverted nipple, mass, nipple discharge, skin change or tenderness  Abdominal:      General: There is no distension  Palpations: Abdomen is soft  There is no mass  Tenderness: There is no abdominal tenderness  There is no guarding or rebound  Genitourinary:     Exam position: Supine  Labia:         Right: No rash, tenderness, lesion or injury  Left: No rash, tenderness, lesion or injury  Vagina: Normal  No vaginal discharge, erythema, tenderness or bleeding  Cervix: No cervical motion tenderness, discharge or friability        Uterus: Not enlarged and not tender  Adnexa:         Right: No mass, tenderness or fullness  Left: No mass, tenderness or fullness  Musculoskeletal:      Cervical back: Normal range of motion and neck supple  Lymphadenopathy:      Cervical: No cervical adenopathy  Upper Body:      Right upper body: No supraclavicular, axillary or pectoral adenopathy  Left upper body: No supraclavicular, axillary or pectoral adenopathy

## 2023-02-07 NOTE — ASSESSMENT & PLAN NOTE
Intermittent condom use  Nonsmoker  Need to review HPV catch up    3/21 NILM HPV other pos  3/22 NILM HPV other pos  4/22 colpo CIN1  2/23 cotest collected

## 2023-02-08 LAB
C TRACH DNA SPEC QL NAA+PROBE: NEGATIVE
HPV HR 12 DNA CVX QL NAA+PROBE: NEGATIVE
HPV16 DNA CVX QL NAA+PROBE: NEGATIVE
HPV18 DNA CVX QL NAA+PROBE: NEGATIVE
N GONORRHOEA DNA SPEC QL NAA+PROBE: NEGATIVE

## 2023-02-15 DIAGNOSIS — J30.1 SEASONAL ALLERGIC RHINITIS DUE TO POLLEN: ICD-10-CM

## 2023-02-15 LAB
LAB AP GYN PRIMARY INTERPRETATION: NORMAL
Lab: NORMAL

## 2023-02-15 RX ORDER — FLUTICASONE PROPIONATE 50 MCG
SPRAY, SUSPENSION (ML) NASAL
Qty: 16 ML | Refills: 0 | Status: SHIPPED | OUTPATIENT
Start: 2023-02-15

## 2023-02-16 ENCOUNTER — EVALUATION (OUTPATIENT)
Dept: PHYSICAL THERAPY | Facility: CLINIC | Age: 43
End: 2023-02-16

## 2023-02-16 VITALS — SYSTOLIC BLOOD PRESSURE: 110 MMHG | DIASTOLIC BLOOD PRESSURE: 75 MMHG

## 2023-02-16 DIAGNOSIS — R10.32 LEFT INGUINAL PAIN: ICD-10-CM

## 2023-02-16 DIAGNOSIS — R10.9 FLANK PAIN: ICD-10-CM

## 2023-02-16 NOTE — PROGRESS NOTES
PT Evaluation     Today's date: 2023  Patient name: Franko Moran  : 1980  MRN: [id number]  Referring provider: Danita Calloway MD  Dx:   Encounter Diagnosis     ICD-10-CM    1  Flank pain  R10 9 Ambulatory Referral to Physical Therapy     PT plan of care cert/re-cert      2  Left inguinal pain  R10 32 Ambulatory Referral to Physical Therapy     PT plan of care cert/re-cert          Start Time: 1100  Stop Time: 5104  Total time in clinic (min): 45 minutes    Assessment  Assessment details: Fatmata Gutierrez is a pleasant 41 yo female presenting to OP PT secondary to complaints of Left sided groin as well as hip pain and Left-sided abdominal/thoracic pain with insidious onset 2022  Pt has limited tolerance to prolonged sitting, standing, forward bending, lifting, and overhead reaching  Pt presents with Left hip strength and ROM deficits, tenderness of iliopsoas, quad muscle tendon, and pain along left adductor insertion  Pt additionally demonstrates Left UE strength deficits, T spine ROM deficits, and RA as well as lat tenderness tenderness  Pt would benefit from skilled PT to address stated deficits and improve return to PLOF  Impairments: abnormal or restricted ROM, impaired physical strength and pain with function    Symptom irritability: highUnderstanding of Dx/Px/POC: good   Prognosis: good    Goals  1  Decrease pain by 50% in 4 weeks  2  Increase left lower extremity strength golbally to 4/5 in 6 weeks  3  Increase left hip abductor and external rotator strength strength to 4-/5  6 weeks to improve tolerance to putting on shoes/socks  4  Pt will demonstrate 20 deg improvement in Left hip flex/abd  to increase tolerance to getting into/out of her chair, and putting on shoes/socks  5  Pt will demonstrate Thoracic AROM WNL to improve ease with completing ADLS  6  Pt will demonstrate 5/5 in LUE strength to improve tolerance with lifting overhead and reaching for items off tall shelves  1  Return to Prior Level of Function in 8 weeks  2  Pt will demonstrate Ethel with progressive home exercise program to improve carryover and decrease recurrence of symptoms  3  Pt will demonstrate 20% improvement in FOTO score to increase tolerance to functional activities   4  Pt will demonstrate 4+/5 in LE strength to allow for improved tolerance to prolonged amb/standing in 6-8 weeks      Plan  Patient would benefit from: PT eval and skilled physical therapy  Planned modality interventions: TENS, thermotherapy: hydrocollator packs, ultrasound and cryotherapy  Planned therapy interventions: joint mobilization, manual therapy, patient education, stretching, therapeutic activities, strengthening, therapeutic training, therapeutic exercise, balance, gait training and neuromuscular re-education  Frequency: 2x week  Plan of Care beginning date: 2023  Plan of Care expiration date: 2023        Subjective Evaluation    History of Present Illness  Mechanism of injury: Pt presents to OP PT secondary to hx of left abdominal and left groin pain with insidious onset 2022  She reports she has hx chronic Left hip bursitis as well, which she reports may be overlapping with current sx  Ben reports pain in these regions increase with prolonged sitting in deep chairs or when driving  She additionally verbalizes pain with carrying boxes, pushing grocery cart, and with bending forward  Pt obtained CT scan with no reports of sig finding  She was diagnosed with mild diverticulitis and changed diet to assist with abdominal pain, she verbalizes mild changes in sx  She reports she had pelvic floor PT at [facility] from September to  for perineal swelling and bladder retraining   She presents to OP PT for treatment  Quality of life: good    Pain  Current pain ratin  At best pain ratin  At worst pain rating: 10  Quality: dull ache, discomfort and needle-like  Relieving factors: ice and medications  Aggravating factors: sitting, overhead activity and lifting  Progression: no change    Social Support  Steps to enter house: yes  Stairs in house: yes   Lives in: multiple-level home  Lives with: parents    Employment status: not working  Hand dominance: right      Diagnostic Tests  CT scan: normal  Treatments  Previous treatment: medication  Current treatment: medication  Patient Goals  Patient goals for therapy: decreased pain  Patient goal: Chandra Ferrell without pain,  do hair without pain  Objective     Palpation   Left   Tenderness of the iliopsoas, proximal biceps femoris, proximal semimembranosus, quadratus lumborum, rectus femoris and sartorius  Tenderness   Cervical Spine   Tenderness in the left ribs/costal cartilage  No tenderness in the left scapula, left transverse process and left ribs  Left Hip   Tenderness in the ASIS, iliac crest, inguinal ligament and pubic tubercle  No tenderness in the ischial tuberosity and sacroiliac joint  Active Range of Motion   Cervical/Thoracic Spine       Thoracic    Flexion:  with pain Restriction level: moderate  Extension:  with pain Restriction level: maximal  Left lateral flexion:  Restriction level: minimal  Right lateral flexion:  Restriction level: minimal  Left rotation:  Restriction level: moderate  Right rotation:  with pain Restriction level: moderate  Left Shoulder   Normal active range of motion    Right Shoulder   Normal active range of motion  Left Hip   Flexion: 80 degrees with pain  Abduction: 30 degrees with pain  External rotation (90/90): 25 degrees with pain  Internal rotation (90/90): 10 degrees with pain    Additional Active Range of Motion Details  Extension: Feelings of tightness   Flexion: Increase in sx   Left rotation increase in sx   Right rotation: feelings of tightness       Strength/Myotome Testing     Left Shoulder     Planes of Motion   Flexion: 4+   Extension: 4+   Abduction: 4+     Isolated Muscles   Lower trapezius: 4   Middle trapezius: 4     Right Shoulder     Planes of Motion   Flexion: 4   Extension: 4-   Abduction: 4     Isolated Muscles   Lower trapezius: 3+     Left Hip   Planes of Motion   Flexion: 3+  External rotation: 3+  Internal rotation: 3+    Right Hip   Normal muscle strength    Additional Strength Details  Pain with shoulder extension in lats    Tests     Left Hip   Positive JUMA, FADIR and scour  Negative SI compression, SI distraction and sign of the buttock  Modified Tam: Positive  90/90 SLR: Negative  SLR: Negative                Precautions: Hx of Diverticulitis, fibromyalgia      Manuals 2/16            Sidelying  Lat stretch             LAD             STM of Quad Tendon                          Neuro Re-Ed             PPT             PPT with hip abd             PPT hip add marlena                                                                 Ther Ex             T spine Rot, Sidebend,               Scap retraction             Shoulder posterior              T band rows, ext, no monies              Multifid press             Hip abd/add marlena                                       Ther Activity                                       Gait Training                                       Modalities

## 2023-02-20 ENCOUNTER — OFFICE VISIT (OUTPATIENT)
Dept: PHYSICAL THERAPY | Facility: CLINIC | Age: 43
End: 2023-02-20

## 2023-02-20 DIAGNOSIS — R10.32 LEFT INGUINAL PAIN: ICD-10-CM

## 2023-02-20 DIAGNOSIS — R10.9 FLANK PAIN: Primary | ICD-10-CM

## 2023-02-20 NOTE — PROGRESS NOTES
Daily Note     Today's date: 2023  Patient name: Angus Gomez  : 1980  MRN: [id number]  Referring provider: Albania Handley MD  Dx:   Encounter Diagnosis     ICD-10-CM    1  Flank pain  R10 9       2  Left inguinal pain  R10 32                      Subjective: Pt reports her pain is a 3-4/10 today, mostly in her L side  Objective: See treatment diary below      Assessment: Tolerated treatment well  Slight increase in flank pain on L side with T-rotation, and PPTs  She was given cues to avoid painful ROM with exercises  She reports feeling a great stretch with manual lat stretch  Educated her on possible DOMS vs increase in pain, will assess response to treatment NV  Patient demonstrated fatigue post treatment, exhibited good technique with therapeutic exercises and would benefit from continued PT      Plan: Continue per plan of care        Precautions: Hx of Diverticulitis, fibromyalgia      Manuals            Sidelying L Lat stretch  3x20" supine           LAD  L 3x20"           STM of Quad Tendon             TPR L QL  3'           Neuro Re-Ed             PPT  5"x10           PPT with hip abd  5"x           PPT hip add marlena  5"x10           Doreen pose              Piriformis stretch  20"x3                                     Ther Ex             T spine rotation, extension  5"x10 ea           Scap retraction  5"x20           Shoulder posterior rolls  20x           T band rows, ext  nv           Multifid press  nv           TB no monies  OTB 2x10           T spine sidebending  5"x8 to R           Open books  5"x10 ea           Ther Activity                                       Gait Training                                       Modalities

## 2023-02-23 ENCOUNTER — OFFICE VISIT (OUTPATIENT)
Dept: PHYSICAL THERAPY | Facility: CLINIC | Age: 43
End: 2023-02-23

## 2023-02-23 DIAGNOSIS — R10.32 LEFT INGUINAL PAIN: ICD-10-CM

## 2023-02-23 DIAGNOSIS — R10.9 FLANK PAIN: Primary | ICD-10-CM

## 2023-02-23 NOTE — PROGRESS NOTES
Daily Note     Today's date: 2023  Patient name: Max Bal  : 1980  MRN: [id number]  Referring provider: Mary Renteria MD  Dx:   Encounter Diagnosis     ICD-10-CM    1  Flank pain  R10 9       2  Left inguinal pain  R10 32           Start Time: 0649  Stop Time: 1230  Total time in clinic (min): 44 minutes    Subjective: Pt rates pain at the start of her session as a 6/10  She denies any medical changes since previous session  Objective: See treatment diary below      Assessment: Ben  tolerated treatment well with consistent cuing throughout  TE's were performed with the same resistance and reps  No new TE's were performed today  Following treatment, the patient exhibited good technique with therapeutic exercises  Plan: Continue per plan of care        Precautions: Hx of Diverticulitis, fibromyalgia      Manuals           Sidelying L Lat stretch  3x20" supine 3x20'' supine          LAD  L 3x20" L3x20"'          STM of Hip flexor Tendon   2'          TPR L QL  3' 3'          Neuro Re-Ed             PPT  5"x10 5''x10          PPT with hip abd  5"x 5''x10          PPT hip add marlena  5"x10 5''x10          PPT march    5''x10          Piriformis stretch  20"x3 20''x3                                     Ther Ex             T spine rotation, extension  5"x10 ea 5''x10 ea          Scap retraction  5"x20 5''x20          Shoulder posterior rolls  20x 20x          T band rows, ext  nv nv          Multifid press  nv nv          TB no monies  OTB 2x10 OTB 2x10          T spine sidebending  5"x8 to R 5''x10 R          Open books  5"x10 ea 5''x10 ea          Ther Activity                                       Gait Training                                       Modalities

## 2023-02-27 ENCOUNTER — OFFICE VISIT (OUTPATIENT)
Dept: PHYSICAL THERAPY | Facility: CLINIC | Age: 43
End: 2023-02-27

## 2023-02-27 DIAGNOSIS — R10.32 LEFT INGUINAL PAIN: ICD-10-CM

## 2023-02-27 DIAGNOSIS — R10.9 FLANK PAIN: Primary | ICD-10-CM

## 2023-02-27 NOTE — PROGRESS NOTES
Daily Note     Today's date: 2023  Patient name: Horacio Lazo  : 1980  MRN: [id number]  Referring provider: Denver Geiger MD  Dx:   Encounter Diagnosis     ICD-10-CM    1  Flank pain  R10 9       2  Left inguinal pain  R10 32                      Subjective: Patient reports feeling ok, notes her hip pain or more posterior  Objective: See treatment diary below      Assessment: Tolerated treatment well  Progressed seated t/s rot to thread the needle, patient reported good stretch felt with no pain  Increased resistance with no monies  She states she has relief with manuals  Patient demonstrated fatigue post treatment and would benefit from continued PT      Plan: Continue per plan of care        Precautions: Hx of Diverticulitis, fibromyalgia      Manuals          Sidelying L Lat stretch  3x20" supine 3x20'' supine 3x 20" supine         LAD  L 3x20" L3x20"' L 2x20"         STM of Hip flexor Tendon   2'          TPR L QL  3' 3'          Neuro Re-Ed             PPT  5"x10 5''x10 5"x10         PPT with hip abd  5"x 5''x10 5" x10         PPT hip add marlena  5"x10 5''x10 5"x10          PPT march    5''x10 5"x10         Piriformis stretch  20"x3 20''x3  20"x3                                   Ther Ex             T spine rotation, extension  5"x10 ea 5''x10 ea 5" x10 ext only         Thread the needle    quadriped 1x10 to R 5" with foam roll         Scap retraction  5"x20 5''x20 5"x20  rows NV        Shoulder posterior rolls  20x 20x 20x         T band rows, ext  nv nv nv         Multifid press  nv nv nv         TB no monies  OTB 2x10 OTB 2x10 GTB 2x10          T spine sidebending  5"x8 to R 5''x10 R 5" x10 R         Open books  5"x10 ea 5''x10 ea 5"x10 ea         Ther Activity                                       Gait Training                                       Modalities

## 2023-03-02 ENCOUNTER — OFFICE VISIT (OUTPATIENT)
Dept: PHYSICAL THERAPY | Facility: CLINIC | Age: 43
End: 2023-03-02

## 2023-03-02 DIAGNOSIS — R10.9 FLANK PAIN: Primary | ICD-10-CM

## 2023-03-02 DIAGNOSIS — R10.32 LEFT INGUINAL PAIN: ICD-10-CM

## 2023-03-02 NOTE — PROGRESS NOTES
Daily Note     Today's date: 3/2/2023  Patient name: Brandi Schilling  : 1980  MRN: [id number]  Referring provider: Abelardo Oakley MD  Dx:   Encounter Diagnosis     ICD-10-CM    1  Flank pain  R10 9       2  Left inguinal pain  R10 32                      Subjective: Patient reports feeling ok after her LV  She states if she sits sophie crossed or in the car she can feel her L sided pain more  Objective: See treatment diary below      Assessment: Introduced active warm up on UBE with good tolerance  T/s ROM improving w open books  New TE's were performed with good techniqe and tolerance  She notes feeling L>R with palloff press  Added 1/2 kneeling stretch to address flank tightness  Patient demonstrated fatigue post treatment and would benefit from continued PT      Plan: Progress treatment as tolerated         Precautions: Hx of Diverticulitis, fibromyalgia      Manuals  3/2        Sidelying L Lat stretch  3x20" supine 3x20'' supine 3x 20" supine 3x20" supine        LAD  L 3x20" L3x20"' L 2x20" L 2x20"        STM of Hip flexor Tendon   2'          TPR L QL  3' 3'          Neuro Re-Ed             PPT  5"x10 5''x10 5"x10 5"x10         PPT with hip abd  5"x 5''x10 5" x10 5"x10  BTB        PPT hip add marlena  5"x10 5''x10 5"x10  5"x10        PPT march    5''x10 5"x10 5"x20         Piriformis stretch  20"x3 20''x3  20"x3 20"x3                                   Ther Ex             T spine rotation, extension  5"x10 ea 5''x10 ea 5" x10 ext only NV        Thread the needle    quadriped 1x10 to R 5" with foam roll NV        Scap retraction  5"x20 5''x20 5"x20  See rows        UBE     5' Retro 3        Shoulder posterior rolls  20x 20x 20x UBE instead        T band rows, ext  nv nv nv RTB 2x10 ea        Multifid press  nv nv nv Dbl RTB  10x ea 5"        TB no monies  OTB 2x10 OTB 2x10 GTB 2x10  GTB 2x10        T spine sidebending  5"x8 to R 5''x10 R 5" x10 R 1/2 kneeling t/s reach and sidebend Open books  5"x10 ea 5''x10 ea 5"x10 ea 5"x10 ea        Ther Activity                                       Gait Training                                       Modalities

## 2023-03-06 ENCOUNTER — OFFICE VISIT (OUTPATIENT)
Dept: PHYSICAL THERAPY | Facility: CLINIC | Age: 43
End: 2023-03-06

## 2023-03-06 DIAGNOSIS — R10.32 LEFT INGUINAL PAIN: ICD-10-CM

## 2023-03-06 DIAGNOSIS — R10.9 FLANK PAIN: Primary | ICD-10-CM

## 2023-03-06 NOTE — PROGRESS NOTES
Daily Note     Today's date: 3/6/2023  Patient name: Tad Hopkins  : 1980  MRN: [id number]  Referring provider: Frank Gaming MD  Dx:   Encounter Diagnosis     ICD-10-CM    1  Flank pain  R10 9       2  Left inguinal pain  R10 32                      Subjective: Patient reports having a difficult time lifting her leg to walk after LV due to soreness  Her pain is getting better but she still deals with fatigue  She feels she is pacing herself better at home with chores  Objective: See treatment diary below      Assessment: Tolerated treatment well  No progression to allow adaptation due to subjective  She has hip flexor fatigue with outlined reps  She is able to "feel" pulling bilaterally with multifidus press but able to complete with no pain  Good stretch with 1/2 kneeling t/s sidebend  Patient demonstrated fatigue post treatment and would benefit from continued PT      Plan: Continue per plan of care        Precautions: Hx of Diverticulitis, fibromyalgia      Manuals 2/16 2/20 2/23 2/27 3/2 3/6       Sidelying L Lat stretch  3x20" supine 3x20'' supine 3x 20" supine 3x20" supine 3x20" supine       LAD  L 3x20" L3x20"' L 2x20" L 2x20" L 2x20"        STM of Hip flexor Tendon   2'          TPR L QL  3' 3'          Neuro Re-Ed             PPT  5"x10 5''x10 5"x10 5"x10  5" x10       PPT with hip abd  5"x 5''x10 5" x10 5"x10  BTB 5"x10 BTB d       PPT hip add marlena  5"x10 5''x10 5"x10  5"x10 5"x10       PPT march    5''x10 5"x10 5"x20  5" x10       Piriformis stretch  20"x3 20''x3  20"x3 20"x3  20"x3                                 Ther Ex             T spine rotation, extension  5"x10 ea 5''x10 ea 5" x10 ext only NV 5"x10       Thread the needle    quadriped 1x10 to R 5" with foam roll NV        Scap retraction  5"x20 5''x20 5"x20  See rows        UBE     5' Retro  5' Retro       Shoulder posterior rolls  20x 20x 20x UBE instead        T band rows, ext  nv nv nv RTB 2x10 ea RTB 2x10 ea        Multifid press  nv nv nv Dbl RTB  10x ea 5" Dbl RTB 10x ea 5"       TB no monies  OTB 2x10 OTB 2x10 GTB 2x10  GTB 2x10 GTB 2x10       T spine sidebending  5"x8 to R 5''x10 R 5" x10 R 1/2 kneeling t/s reach and sidebend 1/2 kneeling t/s reach and sidebend 10x 5"       Open books  5"x10 ea 5''x10 ea 5"x10 ea 5"x10 ea 5"x10 ea       Ther Activity                                       Gait Training                                       Modalities

## 2023-03-09 ENCOUNTER — OFFICE VISIT (OUTPATIENT)
Dept: PHYSICAL THERAPY | Facility: CLINIC | Age: 43
End: 2023-03-09

## 2023-03-09 DIAGNOSIS — R10.32 LEFT INGUINAL PAIN: ICD-10-CM

## 2023-03-09 DIAGNOSIS — R10.9 FLANK PAIN: Primary | ICD-10-CM

## 2023-03-09 NOTE — PROGRESS NOTES
Daily Note     Today's date: 3/9/2023  Patient name: Elvie Watts  : 1980  MRN: [id number]  Referring provider: Greg Conklin MD  Dx:   Encounter Diagnosis     ICD-10-CM    1  Flank pain  R10 9       2  Left inguinal pain  R10 32                      Subjective: Patient reports being unable to hold anything on her L side without pain  Yesterday she was unloading groceries and she has R and L sided flank pain  Objective: See treatment diary below      Assessment: Tolerated treatment well  Patient is able to complete outlined program with no pain or discomfort  She does note being able to "feel" her muscle activation more in the areas that have pain  Focus on core strength  Added pullovers for gentle core strengthening  Updated HEP to include postural and core strengthening  Patient demonstrated fatigue post treatment and would benefit from continued PT      Plan: Progress treatment as tolerated         Precautions: Hx of Diverticulitis, fibromyalgia      Manuals 2/16 2/20 2/23 2/27 3/2 3/6 3/9      Sidelying L Lat stretch  3x20" supine 3x20'' supine 3x 20" supine 3x20" supine 3x20" supine 3x20" supine       LAD  L 3x20" L3x20"' L 2x20" L 2x20" L 2x20"  L 2x20"      STM of Hip flexor Tendon   2'          TPR L QL  3' 3'          Neuro Re-Ed             PPT  5"x10 5''x10 5"x10 5"x10  5" x10 5"x10      PPT with hip abd  5"x 5''x10 5" x10 5"x10  BTB 5"x10 BTB d 5" 1x15  BTB      PPT hip add marlena  5"x10 5''x10 5"x10  5"x10 5"x10 5"x10      PPT march    5''x10 5"x10 5"x20  5" x10 5" 2x10      Piriformis stretch  20"x3 20''x3  20"x3 20"x3  20"x3 20"x3      Pullover       RMB  1x10                   Ther Ex             T spine rotation, extension  5"x10 ea 5''x10 ea 5" x10 ext only NV 5"x10 seated t/s focus cat/cow 1x10      Thread the needle    quadriped 1x10 to R 5" with foam roll NV        Scap retraction  5"x20 5''x20 5"x20  See rows        UBE     5' Retro  5' Retro       Shoulder posterior rolls  20x 20x 20x UBE instead        T band rows, ext  nv nv nv RTB 2x10 ea RTB 2x10 ea  RTB 2x10 ea      Multifid press  nv nv nv Dbl RTB  10x ea 5" Dbl RTB 10x ea 5" Dbl RTB 10x ea 5"       TB no monies  OTB 2x10 OTB 2x10 GTB 2x10  GTB 2x10 GTB 2x10 GTB 2x10       T spine sidebending  5"x8 to R 5''x10 R 5" x10 R 1/2 kneeling t/s reach and sidebend 1/2 kneeling t/s reach and sidebend 10x 5" 1/2 kneeling t/s reach and side       Open books  5"x10 ea 5''x10 ea 5"x10 ea 5"x10 ea 5"x10 ea 5"x10 ea      Ther Activity                                       Gait Training                                       Modalities

## 2023-03-13 ENCOUNTER — OFFICE VISIT (OUTPATIENT)
Dept: PHYSICAL THERAPY | Facility: CLINIC | Age: 43
End: 2023-03-13

## 2023-03-13 DIAGNOSIS — R10.32 LEFT INGUINAL PAIN: ICD-10-CM

## 2023-03-13 DIAGNOSIS — R10.9 FLANK PAIN: Primary | ICD-10-CM

## 2023-03-13 NOTE — PROGRESS NOTES
3Daily Note     Today's date: 3/13/2023  Patient name: Bennett Reaves  : 1980  MRN: [id number]  Referring provider: Renuka Pereira MD  Dx:   Encounter Diagnosis     ICD-10-CM    1  Flank pain  R10 9       2  Left inguinal pain  R10 32                      Subjective: Patient presents to PT late and was accommodated  She drove about a half hour after LV and she had some increase pain  Objective: See treatment diary below      Assessment: Tolerated treatment well  She had some sharp sensation in her L ribs as she performed pullovers not at the beginning but as she continued, held in smaller set  No pain or discomfort reported with rest of charted program  No progression to allow adaptation due to subjective  Cues for technique with good carryover  Patient demonstrated fatigue post treatment and would benefit from continued PT      Plan: Progress treatment as tolerated         Precautions: Hx of Diverticulitis, fibromyalgia      Manuals 2/16 2/20 2/23 2/27 3/2 3/6 3/9 3/13     Sidelying L Lat stretch  3x20" supine 3x20'' supine 3x 20" supine 3x20" supine 3x20" supine 3x20" supine  3x20" supine     LAD  L 3x20" L3x20"' L 2x20" L 2x20" L 2x20"  L 2x20" L 2x20"     STM of Hip flexor Tendon   2'          TPR L QL  3' 3'          Neuro Re-Ed             PPT  5"x10 5''x10 5"x10 5"x10  5" x10 5"x10      PPT with hip abd  5"x 5''x10 5" x10 5"x10  BTB 5"x10 BTB d 5" 1x15  BTB      Bridge w/ hip Abd        1x10 BTB     PPT hip add marlena  5"x10 5''x10 5"x10  5"x10 5"x10 5"x10 5"x10      PPT march    5''x10 5"x10 5"x20  5" x10 5" 2x10 2x10 ea     Piriformis stretch  20"x3 20''x3  20"x3 20"x3  20"x3 20"x3 20"x3     Pullover       RMB  1x10 RMB 1x6                  Ther Ex             T spine rotation, extension  5"x10 ea 5''x10 ea 5" x10 ext only NV 5"x10 seated t/s focus cat/cow 1x10 Seated t/s focus cat/cow 1x10     Thread the needle    quadriped 1x10 to R 5" with foam roll NV        Scap retraction  5"x20 5''x20 5"x20  See rows        UBE     5' Retro  5' Retro  time     Shoulder posterior rolls  20x 20x 20x UBE instead        T band rows, ext  nv nv nv RTB 2x10 ea RTB 2x10 ea  RTB 2x10 ea RTB 2x10 ea      Multifid press  nv nv nv Dbl RTB  10x ea 5" Dbl RTB 10x ea 5" Dbl RTB 10x ea 5"  Dbl RTB 10x ea 5"      TB no monies  OTB 2x10 OTB 2x10 GTB 2x10  GTB 2x10 GTB 2x10 GTB 2x10  GTB 2x10      T spine sidebending  5"x8 to R 5''x10 R 5" x10 R 1/2 kneeling t/s reach and sidebend 1/2 kneeling t/s reach and sidebend 10x 5" 1/2 kneeling t/s reach and side       Open books  5"x10 ea 5''x10 ea 5"x10 ea 5"x10 ea 5"x10 ea 5"x10 ea 5" x10 ea     Ther Activity                                       Gait Training                                       Modalities

## 2023-03-15 DIAGNOSIS — J30.1 SEASONAL ALLERGIC RHINITIS DUE TO POLLEN: ICD-10-CM

## 2023-03-15 DIAGNOSIS — L29.0 PERIANAL ITCH: ICD-10-CM

## 2023-03-15 RX ORDER — FLUTICASONE PROPIONATE 50 MCG
SPRAY, SUSPENSION (ML) NASAL
Qty: 16 ML | Refills: 0 | Status: SHIPPED | OUTPATIENT
Start: 2023-03-15

## 2023-03-16 ENCOUNTER — OFFICE VISIT (OUTPATIENT)
Dept: PHYSICAL THERAPY | Facility: CLINIC | Age: 43
End: 2023-03-16

## 2023-03-16 DIAGNOSIS — R10.9 FLANK PAIN: Primary | ICD-10-CM

## 2023-03-16 DIAGNOSIS — R10.32 LEFT INGUINAL PAIN: ICD-10-CM

## 2023-03-16 RX ORDER — CLOTRIMAZOLE AND BETAMETHASONE DIPROPIONATE 10; .64 MG/G; MG/G
CREAM TOPICAL 2 TIMES DAILY PRN
Qty: 30 G | Refills: 0 | Status: SHIPPED | OUTPATIENT
Start: 2023-03-16

## 2023-03-16 NOTE — PROGRESS NOTES
Daily Note     Today's date: 3/16/2023  Patient name: Patricia Scott  : 1980  MRN: [id number]  Referring provider: Cayetano Mcelroy MD  Dx:   Encounter Diagnosis     ICD-10-CM    1  Flank pain  R10 9       2  Left inguinal pain  R10 32                      Subjective: Patient states she had to shovel yesterday and her back is irritated as a result  Patient also reports she is having longer periods of feeling good at home with less symptoms  Objective: See treatment diary below      Assessment: Tolerated treatment well  Patient tolerated core stabilization and stretches well without adverse effects or increase in pain  Able to initiate QL stretch this visit which seemed to improve patient symptoms  Patient would benefit from continued PT  Patient 1:1 with PT Sonia Ibrahim from 11:53 to 12:15  IEP for remainder of session  Plan: Continue per plan of care        Precautions: Hx of Diverticulitis, fibromyalgia      Manuals 2/16 2/20 2/23 2/27 3/2 3/6 3/9 3/13 3/16    Sidelying L Lat stretch  3x20" supine 3x20'' supine 3x 20" supine 3x20" supine 3x20" supine 3x20" supine  3x20" supine     LAD  L 3x20" L3x20"' L 2x20" L 2x20" L 2x20"  L 2x20" L 2x20" SD    STM of Hip flexor Tendon   2'          TPR L QL  3' 3'          Neuro Re-Ed             PPT  5"x10 5''x10 5"x10 5"x10  5" x10 5"x10      PPT with hip abd  5"x 5''x10 5" x10 5"x10  BTB 5"x10 BTB d 5" 1x15  BTB      Bridge w/ hip Abd        1x10 BTB 1x10 BTB    PPT hip add marlena  5"x10 5''x10 5"x10  5"x10 5"x10 5"x10 5"x10  5s x 10    PPT march    5''x10 5"x10 5"x20  5" x10 5" 2x10 2x10 ea 2x10 ea    Piriformis stretch  20"x3 20''x3  20"x3 20"x3  20"x3 20"x3 20"x3 20s x 3    Pullover       RMB  1x10 RMB 1x6 RMB x10                 Ther Ex             T spine rotation, extension  5"x10 ea 5''x10 ea 5" x10 ext only NV 5"x10 seated t/s focus cat/cow 1x10 Seated t/s focus cat/cow 1x10     Thread the needle    quadriped 1x10 to R 5" with foam roll NV Scap retraction  5"x20 5''x20 5"x20  See rows        UBE     5' Retro  5' Retro  time 5' retro    Shoulder posterior rolls  20x 20x 20x UBE instead        T band rows, ext  nv nv nv RTB 2x10 ea RTB 2x10 ea  RTB 2x10 ea RTB 2x10 ea  RTB 2x10 ea     Multifid press  nv nv nv Dbl RTB  10x ea 5" Dbl RTB 10x ea 5" Dbl RTB 10x ea 5"  Dbl RTB 10x ea 5"  Dbl RTB 10x ea 5"    TB no monies  OTB 2x10 OTB 2x10 GTB 2x10  GTB 2x10 GTB 2x10 GTB 2x10  GTB 2x10  GTB 2x10    T spine sidebending  5"x8 to R 5''x10 R 5" x10 R 1/2 kneeling t/s reach and sidebend 1/2 kneeling t/s reach and sidebend 10x 5" 1/2 kneeling t/s reach and side   1/2 kneeling t/s reach and sidebend 10x 5" ea    QL stretch         10"x10 L side    Open books  5"x10 ea 5''x10 ea 5"x10 ea 5"x10 ea 5"x10 ea 5"x10 ea 5" x10 ea 5"x10 ea    Ther Activity                                       Gait Training                                       Modalities

## 2023-03-20 ENCOUNTER — OFFICE VISIT (OUTPATIENT)
Dept: PHYSICAL THERAPY | Facility: CLINIC | Age: 43
End: 2023-03-20

## 2023-03-20 DIAGNOSIS — R10.9 FLANK PAIN: Primary | ICD-10-CM

## 2023-03-20 DIAGNOSIS — R10.32 LEFT INGUINAL PAIN: ICD-10-CM

## 2023-03-20 NOTE — PROGRESS NOTES
Daily Note     Today's date: 3/20/2023  Patient name: Angus Gomez  : 1980  MRN: [id number]  Referring provider: Albania Handley MD  Dx:   Encounter Diagnosis     ICD-10-CM    1  Flank pain  R10 9       2  Left inguinal pain  R10 32                      Subjective: Patient reports when she reaches into a cabinet or hold a bottle of water  Objective: See treatment diary below      Assessment: Tolerated treatment well  Focus on stretching and introduction to functional strengthening  BKFO added for TA strengthening  Able to progress in reps with pullovers with no symptom aggravation  She was able to perform carries with object close to her body without issue, with un-weighted object she was challenged holding it away from her  She noted some shaking and moved in a controlled manner  Rows/ext and multifidus press added for HEP today  Patient demonstrated fatigue post treatment and would benefit from continued PT      Plan: Continue per plan of care        Precautions: Hx of Diverticulitis, fibromyalgia      Manuals 2/16 2/20 2/23 2/27 3/2 3/6 3/9 3/13 3/16 3/20   Sidelying L Lat stretch  3x20" supine 3x20'' supine 3x 20" supine 3x20" supine 3x20" supine 3x20" supine  3x20" supine  3x20"   LAD  L 3x20" L3x20"' L 2x20" L 2x20" L 2x20"  L 2x20" L 2x20" SD L 2x20"   STM of Hip flexor Tendon   2'          TPR L QL  3' 3'          Neuro Re-Ed             PPT  5"x10 5''x10 5"x10 5"x10  5" x10 5"x10      PPT with hip abd  5"x 5''x10 5" x10 5"x10  BTB 5"x10 BTB d 5" 1x15  BTB      Bridge w/ hip Abd        1x10 BTB 1x10 BTB    PPT hip add marlena  5"x10 5''x10 5"x10  5"x10 5"x10 5"x10 5"x10  5s x 10    PPT march    5''x10 5"x10 5"x20  5" x10 5" 2x10 2x10 ea 2x10 ea    Piriformis stretch  20"x3 20''x3  20"x3 20"x3  20"x3 20"x3 20"x3 20s x 3    Pullover       RMB  1x10 RMB 1x6 RMB x10 RMB 1x15    BKFO          2x10 on L   Ther Ex             T spine rotation, extension  5"x10 ea 5''x10 ea 5" x10 ext only NV 5"x10 seated t/s focus cat/cow 1x10 Seated t/s focus cat/cow 1x10     Thread the needle    quadriped 1x10 to R 5" with foam roll NV        Scap retraction  5"x20 5''x20 5"x20  See rows        UBE     5' Retro  5' Retro  time 5' retro 5' retro   Shoulder posterior rolls  20x 20x 20x UBE instead        T band rows, ext HEP today  nv nv nv RTB 2x10 ea RTB 2x10 ea  RTB 2x10 ea RTB 2x10 ea  RTB 2x10 ea     Multifid press HEP today  nv nv nv Dbl RTB  10x ea 5" Dbl RTB 10x ea 5" Dbl RTB 10x ea 5"  Dbl RTB 10x ea 5"  Dbl RTB 10x ea 5"    TB no monies  OTB 2x10 OTB 2x10 GTB 2x10  GTB 2x10 GTB 2x10 GTB 2x10  GTB 2x10  GTB 2x10    T spine sidebending  5"x8 to R 5''x10 R 5" x10 R 1/2 kneeling t/s reach and sidebend 1/2 kneeling t/s reach and sidebend 10x 5" 1/2 kneeling t/s reach and side   1/2 kneeling t/s reach and sidebend 10x 5" ea 1/2 kneeling t/s reach and sidebend 10"x10 ea   QL stretch         10"x10 L side 10"x10 L side   Open books  5"x10 ea 5''x10 ea 5"x10 ea 5"x10 ea 5"x10 ea 5"x10 ea 5" x10 ea 5"x10 ea 10"x10   Ther Activity             Ball carry          Yellow PB  2 laps ea arms down/arms extended                Gait Training                                       Modalities

## 2023-03-23 ENCOUNTER — OFFICE VISIT (OUTPATIENT)
Dept: PHYSICAL THERAPY | Facility: CLINIC | Age: 43
End: 2023-03-23

## 2023-03-23 DIAGNOSIS — R10.9 FLANK PAIN: Primary | ICD-10-CM

## 2023-03-23 DIAGNOSIS — R10.32 LEFT INGUINAL PAIN: ICD-10-CM

## 2023-03-23 NOTE — PROGRESS NOTES
Daily Note     Today's date: 3/23/2023  Patient name: Gurjit Forrester  : 1980  MRN: [id number]  Referring provider: Andrea Stanford MD  Dx:   Encounter Diagnosis     ICD-10-CM    1  Flank pain  R10 9       2  Left inguinal pain  R10 32                      Subjective: Patient reports noticing a little easier time with chores around the house  Objective: See treatment diary below      Assessment: Tolerated treatment well  Diagonal lifts added for core/rotational strengthening  Patient demonstrated fatigue post treatment and would benefit from continued PT      Plan: Continue per plan of care        Precautions: Hx of Diverticulitis, fibromyalgia      Manuals 3/23  2/23 2/27 3/2 3/6 3/9 3/13 3/16 3/20   Sidelying L Lat stretch 3x20"L   3x20'' supine 3x 20" supine 3x20" supine 3x20" supine 3x20" supine  3x20" supine  3x20"   LAD   L3x20"' L 2x20" L 2x20" L 2x20"  L 2x20" L 2x20" SD L 2x20"   STM of Hip flexor Tendon   2'          TPR L QL   3'          Neuro Re-Ed             PPT   5''x10 5"x10 5"x10  5" x10 5"x10      PPT with hip abd   5''x10 5" x10 5"x10  BTB 5"x10 BTB d 5" 1x15  BTB      Bridge w/ hip Abd        1x10 BTB 1x10 BTB    PPT hip add marlena   5''x10 5"x10  5"x10 5"x10 5"x10 5"x10  5s x 10    PPT march    5''x10 5"x10 5"x20  5" x10 5" 2x10 2x10 ea 2x10 ea    Piriformis stretch   20''x3  20"x3 20"x3  20"x3 20"x3 20"x3 20s x 3    Pullover RMB 1x15      RMB  1x10 RMB 1x6 RMB x10 RMB 1x15    BKFO 2x10 on L          2x10 on L   Ther Ex             T spine rotation, extension Seated t/s focus cat/cow 1x10  5''x10 ea 5" x10 ext only NV 5"x10 seated t/s focus cat/cow 1x10 Seated t/s focus cat/cow 1x10     Thread the needle    quadriped 1x10 to R 5" with foam roll NV        Scap retraction   5''x20 5"x20  See rows        UBE 5' retro    5' Retro  5' Retro  time 5' retro 5' retro   Shoulder posterior rolls   20x 20x UBE instead        T band rows, ext RTB 2x10 ea   nv nv RTB 2x10 ea RTB 2x10 ea  RTB 2x10 ea RTB 2x10 ea  RTB 2x10 ea     Multifid press HEP  nv nv Dbl RTB  10x ea 5" Dbl RTB 10x ea 5" Dbl RTB 10x ea 5"  Dbl RTB 10x ea 5"  Dbl RTB 10x ea 5"    TB no monies HEP  OTB 2x10 GTB 2x10  GTB 2x10 GTB 2x10 GTB 2x10  GTB 2x10  GTB 2x10    T spine sidebending   5''x10 R 5" x10 R 1/2 kneeling t/s reach and sidebend 1/2 kneeling t/s reach and sidebend 10x 5" 1/2 kneeling t/s reach and side   1/2 kneeling t/s reach and sidebend 10x 5" ea 1/2 kneeling t/s reach and sidebend 10"x10 ea   QL stretch         10"x10 L side 10"x10 L side   Open books 10"x10  5''x10 ea 5"x10 ea 5"x10 ea 5"x10 ea 5"x10 ea 5" x10 ea 5"x10 ea 10"x10   Diagonal Lifts W  yellow PB 10x ea                          Ther Activity             Ball carry Yellow ball 2 laps arms extended         Yellow PB  2 laps ea arms down/arms extended                Gait Training                                       Modalities

## 2023-03-27 ENCOUNTER — OFFICE VISIT (OUTPATIENT)
Dept: PHYSICAL THERAPY | Facility: CLINIC | Age: 43
End: 2023-03-27

## 2023-03-27 DIAGNOSIS — R10.32 LEFT INGUINAL PAIN: ICD-10-CM

## 2023-03-27 DIAGNOSIS — R10.9 FLANK PAIN: Primary | ICD-10-CM

## 2023-03-27 NOTE — PROGRESS NOTES
"Daily Note     Today's date: 3/27/2023  Patient name: Silvina Bernal  : 1980  MRN: [id number]  Referring provider: Ashlyn Connelly MD  Dx:   Encounter Diagnosis     ICD-10-CM    1  Flank pain  R10 9       2  Left inguinal pain  R10 32                      Subjective: Patient reports a slight improvement in symptoms  She is having an easier time performing household chores  Objective: See treatment diary below      Assessment: Tolerated treatment well  SLR and clamshells added to progress dynamic lumbar stabilization  She was able to perform outlined program without instance of pain  She notes some pulling in L hip flexor with rotation to R  Patient demonstrated fatigue post treatment and would benefit from continued PT      Plan: Progress treatment as tolerated         Precautions: Hx of Diverticulitis, fibromyalgia      Manuals 3/23 3/27  2/27 3/2 3/6 3/9 3/13 3/16 3/20   Sidelying L Lat stretch 3x20\"L    3x 20\" supine 3x20\" supine 3x20\" supine 3x20\" supine  3x20\" supine  3x20\"   LAD    L 2x20\" L 2x20\" L 2x20\"  L 2x20\" L 2x20\" SD L 2x20\"   STM of Hip flexor Tendon             TPR L QL             Neuro Re-Ed             PPT    5\"x10 5\"x10  5\" x10 5\"x10      PPT with hip abd    5\" x10 5\"x10  BTB 5\"x10 BTB d 5\" 1x15  BTB      Bridge w/ hip Abd        1x10 BTB 1x10 BTB    PPT hip add marlena    5\"x10  5\"x10 5\"x10 5\"x10 5\"x10  5s x 10    PPT \"x10 5\"x20  5\" x10 5\" 2x10 2x10 ea 2x10 ea    Piriformis stretch    20\"x3 20\"x3  20\"x3 20\"x3 20\"x3 20s x 3    Pullover RMB 1x15      RMB  1x10 RMB 1x6 RMB x10 RMB 1x15    BKFO 2x10 on L  2x10 on L        2x10 on L   Ther Ex             T spine rotation, extension Seated t/s focus cat/cow 1x10   5\" x10 ext only NV 5\"x10 seated t/s focus cat/cow 1x10 Seated t/s focus cat/cow 1x10     Thread the needle    quadriped 1x10 to R 5\" with foam roll NV        Scap retraction    5\"x20  See rows        UBE 5' retro 5' retro   5' Retro  5' Retro  time 5' retro 5' retro " "  Shoulder posterior rolls    20x UBE instead        T band rows, ext RTB 2x10 ea    nv RTB 2x10 ea RTB 2x10 ea  RTB 2x10 ea RTB 2x10 ea  RTB 2x10 ea     Multifid press HEP   nv Dbl RTB  10x ea 5\" Dbl RTB 10x ea 5\" Dbl RTB 10x ea 5\"  Dbl RTB 10x ea 5\"  Dbl RTB 10x ea 5\"    TB no monies HEP   GTB 2x10  GTB 2x10 GTB 2x10 GTB 2x10  GTB 2x10  GTB 2x10    T spine sidebending    5\" x10 R 1/2 kneeling t/s reach and sidebend 1/2 kneeling t/s reach and sidebend 10x 5\" 1/2 kneeling t/s reach and side   1/2 kneeling t/s reach and sidebend 10x 5\" ea 1/2 kneeling t/s reach and sidebend 10\"x10 ea   QL stretch         10\"x10 L side 10\"x10 L side   Open books 10\"x10 10\"x10   5\"x10 ea 5\"x10 ea 5\"x10 ea 5\"x10 ea 5\" x10 ea 5\"x10 ea 10\"x10   Diagonal Lifts W  yellow PB 10x ea  w ylw PB 10x ea           SLR 2x5 B            Clamshells 2x10 OTB            Ther Activity             Ball carry Yellow ball 2 laps arms extended         Yellow PB  2 laps ea arms down/arms extended   Heavy carry +March              Gait Training                                       Modalities                                            "

## 2023-03-30 ENCOUNTER — OFFICE VISIT (OUTPATIENT)
Dept: PHYSICAL THERAPY | Facility: CLINIC | Age: 43
End: 2023-03-30

## 2023-03-30 DIAGNOSIS — R10.9 FLANK PAIN: Primary | ICD-10-CM

## 2023-03-30 DIAGNOSIS — R10.32 LEFT INGUINAL PAIN: ICD-10-CM

## 2023-03-30 NOTE — PROGRESS NOTES
"Daily Note     Today's date: 3/30/2023  Patient name: Silvina Bernal  : 1980  MRN: [id number]  Referring provider: Ashlyn Connelly MD  Dx:   Encounter Diagnosis     ICD-10-CM    1  Flank pain  R10 9       2  Left inguinal pain  R10 32                      Subjective: Patient states she felt ok after last visit  Objective: See treatment diary below      Assessment: Tolerated treatment well, able to progress with minimal discomfort on L side  She was challenged with chops going to R due to the increased resistance, performed to tolerance  Patient demonstrated fatigue post treatment and would benefit from continued PT      Plan: Continue per plan of care  Assess patient response        Precautions: Hx of Diverticulitis, fibromyalgia      Manuals 3/23 3/27 3/30  3/2 3/6 3/9 3/13 3/16 3/20   Sidelying L Lat stretch 3x20\"L   3x20\" L  3x20\" supine 3x20\" supine 3x20\" supine  3x20\" supine  3x20\"   LAD     L 2x20\" L 2x20\"  L 2x20\" L 2x20\" SD L 2x20\"   STM of Hip flexor Tendon             TPR L QL             Neuro Re-Ed             PPT     5\"x10  5\" x10 5\"x10      PPT with hip abd     5\"x10  BTB 5\"x10 BTB d 5\" 1x15  BTB      Bridge w/ hip Abd        1x10 BTB 1x10 BTB    PPT hip add marlena     5\"x10 5\"x10 5\"x10 5\"x10  5s x 10    PPT \"x20  5\" x10 5\" 2x10 2x10 ea 2x10 ea    Piriformis stretch     20\"x3  20\"x3 20\"x3 20\"x3 20s x 3    Pullover RMB 1x15 RMB 2x10 RMB 2x10    RMB  1x10 RMB 1x6 RMB x10 RMB 1x15    BKFO 2x10 on L  2x10 on L  Peach  2x10 on L peach       2x10 on L   Ther Ex             T spine rotation, extension Seated t/s focus cat/cow 1x10  Seated t/ focus cat cow 1x15 5\"  NV 5\"x10 seated t/s focus cat/cow 1x10 Seated t/s focus cat/cow 1x10     Thread the needle     NV        Scap retraction     See rows        UBE 5' retro 5' retro   5' Retro  5' Retro  time 5' retro 5' retro   Shoulder posterior rolls     UBE instead        T band rows, ext RTB 2x10 ea     RTB 2x10 ea RTB 2x10 ea  RTB 2x10 " "ea RTB 2x10 ea  RTB 2x10 ea     Multifid press HEP  Dbl RTB 10\"x10  Dbl RTB  10x ea 5\" Dbl RTB 10x ea 5\" Dbl RTB 10x ea 5\"  Dbl RTB 10x ea 5\"  Dbl RTB 10x ea 5\"    TB no monies HEP    GTB 2x10 GTB 2x10 GTB 2x10  GTB 2x10  GTB 2x10    T spine sidebending     1/2 kneeling t/s reach and sidebend 1/2 kneeling t/s reach and sidebend 10x 5\" 1/2 kneeling t/s reach and side   1/2 kneeling t/s reach and sidebend 10x 5\" ea 1/2 kneeling t/s reach and sidebend 10\"x10 ea   QL stretch         10\"x10 L side 10\"x10 L side   Open books 10\"x10 10\"x10  10\"x10  5\"x10 ea 5\"x10 ea 5\"x10 ea 5\" x10 ea 5\"x10 ea 10\"x10   Diagonal Lifts W  yellow PB 10x ea  w ylw PB 10x ea RMB 4x ea           SLR 2x5 B  2x8 B          Clamshells 2x10 OTB  NV          Ther Activity             Ball carry Yellow ball 2 laps arms extended  RMB arms extended 1 lap       Yellow PB  2 laps ea arms down/arms extended   Heavy carry +March              Gait Training                                       Modalities                                            "

## 2023-04-03 ENCOUNTER — OFFICE VISIT (OUTPATIENT)
Dept: PHYSICAL THERAPY | Facility: CLINIC | Age: 43
End: 2023-04-03

## 2023-04-03 DIAGNOSIS — R10.32 LEFT INGUINAL PAIN: ICD-10-CM

## 2023-04-03 DIAGNOSIS — R10.9 FLANK PAIN: Primary | ICD-10-CM

## 2023-04-03 NOTE — PROGRESS NOTES
"Daily Note     Today's date: 4/3/2023  Patient name: Deya Valladares  : 1980  MRN: [id number]  Referring provider: Rosalinda Vazquez MD  Dx:   Encounter Diagnosis     ICD-10-CM    1  Flank pain  R10 9       2  Left inguinal pain  R10 32                      Subjective: Patients progress with feeling better, although she notes it is going slow  Objective: See treatment diary below      Assessment: Tolerated treatment well  Patient was able to progress carries in resistance with good challenge and effort noted  Cont left sided discomfort so held at smaller sets  She cont to be challenged with outlined reps and resistance  Patient demonstrated fatigue post treatment and would benefit from continued PT      Plan: Progress treatment as tolerated         Precautions: Hx of Diverticulitis, fibromyalgia      Manuals 3/23 3/27 3/30 4/3  3/6 3/9 3/13 3/16 3/20   Sidelying L Lat stretch 3x20\"L   3x20\" L 3x20\" L   3x20\" supine 3x20\" supine  3x20\" supine  3x20\"   LAD      L 2x20\"  L 2x20\" L 2x20\" SD L 2x20\"   STM of Hip flexor Tendon             TPR L QL             Neuro Re-Ed             PPT      5\" x10 5\"x10      PPT with hip abd      5\"x10 BTB d 5\" 1x15  BTB      Bridge w/ hip Abd        1x10 BTB 1x10 BTB    PPT hip add marlena      5\"x10 5\"x10 5\"x10  5s x 10    PPT \" x10 5\" 2x10 2x10 ea 2x10 ea    Piriformis stretch      20\"x3 20\"x3 20\"x3 20s x 3    Pullover RMB 1x15 RMB 2x10 RMB 2x10 RMB 2x10   RMB  1x10 RMB 1x6 RMB x10 RMB 1x15    BKFO 2x10 on L  2x10 on L  Peach  2x10 on L peach 2x10 on L peach      2x10 on L   Ther Ex             T spine rotation, extension Seated t/s focus cat/cow 1x10  Seated t/ focus cat cow 1x15 5\" HEP today  5\"x10 seated t/s focus cat/cow 1x10 Seated t/s focus cat/cow 1x10     Thread the needle             Scap retraction             UBE 5' retro 5' retro  5' retro  5' Retro  time 5' retro 5' retro   Shoulder posterior rolls             T band rows, ext RTB 2x10 ea      RTB " "2x10 ea  RTB 2x10 ea RTB 2x10 ea  RTB 2x10 ea     Multifid press HEP  Dbl RTB 10\"x10 Dble RTB 10\"x10  Dbl RTB 10x ea 5\" Dbl RTB 10x ea 5\"  Dbl RTB 10x ea 5\"  Dbl RTB 10x ea 5\"    TB no monies HEP     GTB 2x10 GTB 2x10  GTB 2x10  GTB 2x10    T spine sidebending    HEP today  1/2 kneeling t/s reach and sidebend 10x 5\" 1/2 kneeling t/s reach and side   1/2 kneeling t/s reach and sidebend 10x 5\" ea 1/2 kneeling t/s reach and sidebend 10\"x10 ea   QL stretch         10\"x10 L side 10\"x10 L side   Open books 10\"x10 10\"x10  10\"x10 10\"x10 ea   5\"x10 ea 5\"x10 ea 5\" x10 ea 5\"x10 ea 10\"x10   Diagonal Lifts W  yellow PB 10x ea  w ylw PB 10x ea RMB 4x ea  RMB 4x ea         SLR 2x5 B  2x8 B 2x8 B          Clamshells 2x10 OTB  NV          Ther Activity             Ball carry Yellow ball 2 laps arms extended  RMB arms extended 1 lap RMB arms extended midway 1 lap      Yellow PB  2 laps ea arms down/arms extended   Heavy carry +March              Gait Training                                       Modalities                                            "

## 2023-04-24 ENCOUNTER — OFFICE VISIT (OUTPATIENT)
Dept: PHYSICAL THERAPY | Facility: CLINIC | Age: 43
End: 2023-04-24

## 2023-04-24 DIAGNOSIS — R10.9 FLANK PAIN: Primary | ICD-10-CM

## 2023-04-24 DIAGNOSIS — R10.32 LEFT INGUINAL PAIN: ICD-10-CM

## 2023-04-24 NOTE — PROGRESS NOTES
"Daily Note     Today's date: 2023  Patient name: Cranston Duane  : 1980  MRN: [id number]  Referring provider: Bruce Carmona MD  Dx:   Encounter Diagnosis     ICD-10-CM    1  Flank pain  R10 9       2  Left inguinal pain  R10 32                      Subjective: Patient states she felt really good after last session  She notices she still has pain with a push/pull motion even when using her R UE  Objective: See treatment diary below      Assessment: Tolerated treatment well  Able to make small progressions in resistance to which she notes a good challenge  Fatigues still evident but improved from past sessions  Patient demonstrated fatigue post treatment and would benefit from continued PT      Plan: Continue per plan of care        Precautions: Hx of Diverticulitis, fibromyalgia      Manuals 3/23 3/27 3/30 4/3 4/10 4/13 4/17 4/20 4/24    Sidelying L Lat stretch 3x20\"L   3x20\" L 3x20\" L  3x20\"  L 3x20\"  L 3x20\"L      LAD             STM of Hip flexor Tendon             TPR L QL             Neuro Re-Ed             Pt education      4'       PPT with hip abd             Bridge w/ hip Abd             PPT hip add marlena             PPT march              Piriformis stretch             Pullover RMB 1x15 RMB 2x10 RMB 2x10 RMB 2x10 RMB  2x12 RMB  2x12 RMB 2x10 RMB 2x10  3#    2x10    BKFO 2x10 on L  2x10 on L  Peach  2x10 on L peach 2x10 on L peach 2x10 on L Peach 2x10 on L grn 2x10 bilat  OTB 2x10 bilat OTB 2x10 bilat OTB    Ther Ex             T spine rotation, extension Seated t/s focus cat/cow 1x10  Seated t/ focus cat cow 1x15 5\" HEP today         Thread the needle             Scap retraction             UBE for UE strengthening 5' retro 5' retro  5' retro 5' retro 5' retro 5' retro 5' 5' retro    Shoulder posterior rolls             T band rows, ext RTB 2x10 ea             Multifid press HEP  Dbl RTB 10\"x10 Dble RTB 10\"x10 Dble RTB 10\"x10 Dble RTB 10\"x10 Walk out + lateral stepping OTB above " "knees  Dbl RTB  5x ea  Walk out + lateral stepping OTB above knees  Dbl RTB  5x ea  Walk out + lateral stepping OTB above knees  Dbl RTB  5x ea     TB no monies HEP            T spine sidebending    HEP today 1/2 kneeling t/s reach and sidebend 10\"x10 ea   1/2 kneeling t/s reach and sidebend 10\"x10 ea   1/2 kneeling t/s reach and sidebend 10\"x10 1/2 kneeling t/s reach and sidebend 10\"x10 1/2 kneeling t/s reach and sidebend 10\"x10    QL stretch             Open books 10\"x10 10\"x10  10\"x10 10\"x10 ea  10\"x10 ea 10\"x10 ea 10\"x10 10\"x10 10\"x10    Diagonal Lifts W  yellow PB 10x ea  w ylw PB 10x ea RMB 4x ea  RMB 4x ea RMB   5x ea RMB   5x ea RMB 5x ea RMB 5x ea  RMB 5x ea    SLR 2x5 B  2x8 B 2x8 B  2x10 B  2x10 B 2x10 B 2x10 B 2x10 B    Clamshells 2x10 OTB  NV    10x 10\"  On L  10\"x10 NV    Ther Activity             Ball carry Yellow ball 2 laps arms extended  RMB arms extended 1 lap RMB arms extended midway 1 lap RMB arms extended midway 2 laps RMB arms extended midway 2 laps RMB arms extended midway  RMB arms extended midway   2 laps      Heavy carry +March          suitcast 3# 20 ft 1 lap ea UE    Gait Training                                       Modalities                                                    "

## 2023-04-27 ENCOUNTER — OFFICE VISIT (OUTPATIENT)
Dept: PHYSICAL THERAPY | Facility: CLINIC | Age: 43
End: 2023-04-27

## 2023-04-27 DIAGNOSIS — R10.32 LEFT INGUINAL PAIN: ICD-10-CM

## 2023-04-27 DIAGNOSIS — R10.9 FLANK PAIN: Primary | ICD-10-CM

## 2023-04-27 NOTE — PROGRESS NOTES
"Daily Note     Today's date: 2023  Patient name: Ginny Staples  : 1980  MRN: [id number]  Referring provider: Goldie Paniagua MD  Dx:   Encounter Diagnosis     ICD-10-CM    1  Flank pain  R10 9       2  Left inguinal pain  R10 32                      Subjective: Patient states she is feeling ok today, she felt good after LV  Objective: See treatment diary below      Assessment: Tolerated treatment well  Able  to perform additional rep with diagonal lifts, reported this as a challenge but no pain  Patient demonstrated fatigue post treatment and would benefit from continued PT      Plan: Progress treatment as tolerated         Precautions: Hx of Diverticulitis, fibromyalgia      Manuals 3/23 3/27 3/30 4/3 4/10 4/13 4/17 4/20 4/24 4/27   Sidelying L Lat stretch 3x20\"L   3x20\" L 3x20\" L  3x20\"  L 3x20\"  L 3x20\"L      LAD             STM of Hip flexor Tendon             TPR L QL             Neuro Re-Ed             Pt education      4'       PPT with hip abd             Bridge w/ hip Abd             PPT hip add marlena             PPT march              Piriformis stretch             Pullover RMB 1x15 RMB 2x10 RMB 2x10 RMB 2x10 RMB  2x12 RMB  2x12 RMB 2x10 RMB 2x10  3#    2x10 3# 2x10   BKFO 2x10 on L  2x10 on L  Peach  2x10 on L peach 2x10 on L peach 2x10 on L Peach 2x10 on L grn 2x10 bilat  OTB 2x10 bilat OTB 2x10 bilat OTB 2x10 bilat OTB   Ther Ex             T spine rotation, extension Seated t/s focus cat/cow 1x10  Seated t/ focus cat cow 1x15 5\" HEP today         Thread the needle             Scap retraction             UBE for UE strengthening 5' retro 5' retro  5' retro 5' retro 5' retro 5' retro 5' 5' retro 5' retro   Shoulder posterior rolls             T band rows, ext RTB 2x10 ea             Multifid press HEP  Dbl RTB 10\"x10 Dble RTB 10\"x10 Dble RTB 10\"x10 Dble RTB 10\"x10 Walk out + lateral stepping OTB above knees  Dbl RTB  5x ea  Walk out + lateral stepping OTB above knees  Dbl RTB  5x ea  " "Walk out + lateral stepping OTB above knees  Dbl RTB  5x ea  Walk out + lateral stepping OTB above knees  Dbl RTB  5x ea   TB no monies HEP            T spine sidebending    HEP today 1/2 kneeling t/s reach and sidebend 10\"x10 ea   1/2 kneeling t/s reach and sidebend 10\"x10 ea   1/2 kneeling t/s reach and sidebend 10\"x10 1/2 kneeling t/s reach and sidebend 10\"x10 1/2 kneeling t/s reach and sidebend 10\"x10 1/2 kneeling t/s reach and sidebend 10\"x10   QL stretch             Open books 10\"x10 10\"x10  10\"x10 10\"x10 ea  10\"x10 ea 10\"x10 ea 10\"x10 10\"x10 10\"x10 10\"x10    Diagonal Lifts W  yellow PB 10x ea  w ylw PB 10x ea RMB 4x ea  RMB 4x ea RMB   5x ea RMB   5x ea RMB 5x ea RMB 5x ea  RMB 5x ea RMB 6x ea   SLR 2x5 B  2x8 B 2x8 B  2x10 B  2x10 B 2x10 B 2x10 B 2x10 B 2x10 B   Clamshells 2x10 OTB  NV    10x 10\"  On L  10\"x10 NV    Ther Activity             Ball carry Yellow ball 2 laps arms extended  RMB arms extended 1 lap RMB arms extended midway 1 lap RMB arms extended midway 2 laps RMB arms extended midway 2 laps RMB arms extended midway  RMB arms extended midway   2 laps      Heavy carry +March          suitcast 3# 20 ft 1 lap ea UE    Gait Training                                       Modalities                                                      "

## 2023-05-01 ENCOUNTER — OFFICE VISIT (OUTPATIENT)
Dept: PHYSICAL THERAPY | Facility: CLINIC | Age: 43
End: 2023-05-01

## 2023-05-01 ENCOUNTER — OFFICE VISIT (OUTPATIENT)
Dept: URGENT CARE | Facility: CLINIC | Age: 43
End: 2023-05-01

## 2023-05-01 VITALS
BODY MASS INDEX: 23.34 KG/M2 | WEIGHT: 136 LBS | OXYGEN SATURATION: 100 % | SYSTOLIC BLOOD PRESSURE: 124 MMHG | DIASTOLIC BLOOD PRESSURE: 78 MMHG | TEMPERATURE: 98 F | HEART RATE: 95 BPM | RESPIRATION RATE: 18 BRPM

## 2023-05-01 DIAGNOSIS — R10.32 LEFT INGUINAL PAIN: ICD-10-CM

## 2023-05-01 DIAGNOSIS — R10.9 FLANK PAIN: Primary | ICD-10-CM

## 2023-05-01 DIAGNOSIS — J02.9 ACUTE VIRAL PHARYNGITIS: Primary | ICD-10-CM

## 2023-05-01 DIAGNOSIS — J02.9 SORE THROAT: ICD-10-CM

## 2023-05-01 LAB — S PYO AG THROAT QL: NEGATIVE

## 2023-05-01 NOTE — PROGRESS NOTES
"Daily Note     Today's date: 2023  Patient name: Elkin Pike  : 1980  MRN: [id number]  Referring provider: Zully Jurado MD  Dx:   Encounter Diagnosis     ICD-10-CM    1  Flank pain  R10 9       2  Left inguinal pain  R10 32                      Subjective: Patient states her body feels tired today  Objective: See treatment diary below      Assessment: Tolerated treatment well  Able to complete charted program with no c/o pain or discomfort  She notes less fatigue and shaking appears less evident indicating improvement in strength  She cont to be challenged with outlined program but will challenge herself on her \"good\" days  Patient demonstrated fatigue post treatment and would benefit from continued PT      Plan: Continue per plan of care        Precautions: Hx of Diverticulitis, fibromyalgia      Manuals 5/1  3/30 4/3 4/10 4/13 4/17 4/20 4/24 4/27   Sidelying L Lat stretch   3x20\" L 3x20\" L  3x20\"  L 3x20\"  L 3x20\"L      LAD             STM of Hip flexor Tendon             TPR L QL             Neuro Re-Ed             Pt education      4'       PPT with hip abd             Bridge w/ hip Abd             PPT hip add marlena             PPT march              Piriformis stretch             Pullover 3# 2x10   RMB 2x10 RMB 2x10 RMB  2x12 RMB  2x12 RMB 2x10 RMB 2x10  3#    2x10 3# 2x10   BKFO 2x10 bilat OTB   2x10 on L peach 2x10 on L peach 2x10 on L Peach 2x10 on L grn 2x10 bilat  OTB 2x10 bilat OTB 2x10 bilat OTB 2x10 bilat OTB   Ther Ex             T spine rotation, extension   Seated t/ focus cat cow 1x15 5\" HEP today         Thread the needle             Scap retraction             UBE for UE strengthening 5' retro   5' retro 5' retro 5' retro 5' retro 5' 5' retro 5' retro   Shoulder posterior rolls             T band rows, ext             Multifid press Walk out + lateral stepping OTB above knees  Dbl RTB  5x ea  Dbl RTB 10\"x10 Dble RTB 10\"x10 Dble RTB 10\"x10 Dble RTB 10\"x10 Walk out + lateral " "stepping OTB above knees  Dbl RTB  5x ea  Walk out + lateral stepping OTB above knees  Dbl RTB  5x ea  Walk out + lateral stepping OTB above knees  Dbl RTB  5x ea  Walk out + lateral stepping OTB above knees  Dbl RTB  5x ea   TB no monies             T spine sidebending 1/2 kneeling t/s reach and sidebend 10\"x10   HEP today 1/2 kneeling t/s reach and sidebend 10\"x10 ea   1/2 kneeling t/s reach and sidebend 10\"x10 ea   1/2 kneeling t/s reach and sidebend 10\"x10 1/2 kneeling t/s reach and sidebend 10\"x10 1/2 kneeling t/s reach and sidebend 10\"x10 1/2 kneeling t/s reach and sidebend 10\"x10   QL stretch             Open books 10\"x10  10\"x10 10\"x10 ea  10\"x10 ea 10\"x10 ea 10\"x10 10\"x10 10\"x10 10\"x10    Diagonal Lifts RMB 6x ea   RMB 4x ea  RMB 4x ea RMB   5x ea RMB   5x ea RMB 5x ea RMB 5x ea  RMB 5x ea RMB 6x ea   SLR 2x10   2x8 B 2x8 B  2x10 B  2x10 B 2x10 B 2x10 B 2x10 B 2x10 B   Clamshells   NV    10x 10\"  On L  10\"x10 NV    Ther Activity             Ball carry Ball carry arms extended midway 2 laps  RMB arms extended 1 lap RMB arms extended midway 1 lap RMB arms extended midway 2 laps RMB arms extended midway 2 laps RMB arms extended midway  RMB arms extended midway   2 laps      Heavy carry +March          suitcast 3# 20 ft 1 lap ea UE    Gait Training                                       Modalities                                                        "

## 2023-05-01 NOTE — PROGRESS NOTES
[facility]        NAME: Sandeep Robins is a 40 y o  female  : 1980    MRN: [id number]  DATE: May 1, 2023  TIME: 3:11 PM    Assessment and Plan   Acute viral pharyngitis [J02 9]  1  Acute viral pharyngitis        2  Sore throat  POCT rapid strepA    Throat culture            Patient Instructions   Rapid strep in office negative  Will send for culture and contact patient if results come back positive  Increase fluids and rest   Tylenol/Ibuprofen for pain/fever  Salt water gargles and chloraseptic spray  Throat Coat Tea  Follow up with PCP if symptoms do not improve or worsen  Report to the ER with difficulty swallowing or fever uncontrolled with tylenol and ibuprofen     Follow up with PCP in 3-5 days  Proceed to  ER if symptoms worsen  Chief Complaint     Chief Complaint   Patient presents with    Sore Throat     Pt states neighbor was burning garbage for 3 days and on 2nd day her throat was irritated and noticed a white bump in back  Felt like something was stuck  Pt gargled with salt water and felt little relief  Had headache yesterday and is resolved  History of Present Illness       HPI  This is a 40year old female here c/o sore throat for 2 days  Looked in the back of her throat and saw a white spot  Has been doing salt water gargles  denies fever, chills, n/v/d, shortness of breath, chest pain, ear pain, nasal congestion or cough  Review of Systems   Review of Systems   Constitutional: Negative for chills and fever  HENT: Positive for sore throat  Negative for congestion and ear pain  Respiratory: Negative for cough and shortness of breath  Cardiovascular: Negative for chest pain  Gastrointestinal: Negative for diarrhea, nausea and vomiting           Current Medications       Current Outpatient Medications:     albuterol (PROVENTIL HFA,VENTOLIN HFA) 90 mcg/act inhaler, Inhale, Disp: , Rfl:     Bioflavonoid Products (Suy[place]-C) 500-550 MG TABS, Take 1,000 mg by mouth, Disp: , Rfl:     Cholecalciferol 50 MCG (2000 UT) CAPS, Take 3 capsules by mouth 95996 units daily liquid, Disp: , Rfl:     clotrimazole-betamethasone (LOTRISONE) 1-0 05 % cream, APPLY TOPICALLY 2 (TWO) TIMES A DAY AS NEEDED (ITCHING), Disp: 30 g, Rfl: 0    Flarex 0 1 % ophthalmic suspension, INSTILL 1 DROP INTO BOTH EYES 3 TIMES DAILY, Disp: , Rfl:     fluticasone (FLONASE) 50 mcg/act nasal spray, INSTILL 1 SPRAY INTO EACH NOSTRIL 2 (TWO) TIMES A DAY WITH MEALS, Disp: 16 mL, Rfl: 0    zinc gluconate 50 mg tablet, Take 50 mg by mouth daily, Disp: , Rfl:     levocetirizine (XYZAL) 5 MG tablet, Take 5 mg by mouth every evening (Patient not taking: Reported on 5/1/2023), Disp: , Rfl:     neomycin-polymyxin-dexamethasone (MAXITROL) 0 35%-10,000 units/g-0 1%, LOCATION: LEFT EYE   APPLY A SMALL AMOUNT TO EYELID TWICE DAILY X 5 DAYS (Patient not taking: Reported on 5/1/2023), Disp: , Rfl:     Probiotic Product (PROBIOTIC ACIDOPHILUS BEADS PO), Take by mouth (Patient not taking: Reported on 5/1/2023), Disp: , Rfl:     Current Allergies     Allergies as of 05/01/2023 - Reviewed 05/01/2023   Allergen Reaction Noted    Amoxicillin Swelling 05/26/2017            The following portions of the patient's history were reviewed and updated as appropriate: allergies, current medications, past family history, past medical history, past social history, past surgical history and problem list      Past Medical History:   Diagnosis Date    Anxiety     Asthma     Depression     Femoral hernia with obstruction     Gonorrhea 11/15/2021    Irregular menses     Malaise and fatigue     Neurogenic pain     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[address] )     Sleep apnea     Thymus disorder ([address] )     last assessed 4/17/14; resolved 7/2/15       Past Surgical History:   Procedure Laterality Date    DILATION AND CURETTAGE OF UTERUS      NO PAST SURGERIES      OSTEOTOMY  12/2020       Family History   Problem Relation Age of Onset    Asthma Mother     Hyperlipidemia Mother     Hypertension Mother     Obesity Mother     Hyperlipidemia Father     Hypertension Father     Diabetes type II Father     Heart disease Father         valvular         Medications have been verified  Objective   /78   Pulse 95   Temp 98 °F (36 7 °C)   Resp 18   Wt 61 7 kg (136 lb)   SpO2 100%   BMI 23 34 kg/m²        Physical Exam     Physical Exam  Vitals and nursing note reviewed  Constitutional:       General: She is not in acute distress  Appearance: She is normal weight  She is not toxic-appearing  HENT:      Right Ear: Tympanic membrane and ear canal normal       Left Ear: Tympanic membrane and ear canal normal       Nose: No congestion or rhinorrhea  Mouth/Throat:      Mouth: Mucous membranes are moist       Pharynx: No posterior oropharyngeal erythema  Comments: There appears to be a tonsillar stone on the right tonsil  Neurological:      Mental Status: She is alert

## 2023-05-03 LAB — BACTERIA THROAT CULT: NORMAL

## 2023-05-04 ENCOUNTER — OFFICE VISIT (OUTPATIENT)
Dept: PHYSICAL THERAPY | Facility: CLINIC | Age: 43
End: 2023-05-04

## 2023-05-04 ENCOUNTER — NURSE TRIAGE (OUTPATIENT)
Dept: OTHER | Facility: OTHER | Age: 43
End: 2023-05-04

## 2023-05-04 DIAGNOSIS — R10.32 LEFT INGUINAL PAIN: ICD-10-CM

## 2023-05-04 DIAGNOSIS — K64.9 HEMORRHOIDS, UNSPECIFIED HEMORRHOID TYPE: Primary | ICD-10-CM

## 2023-05-04 DIAGNOSIS — R10.9 FLANK PAIN: Primary | ICD-10-CM

## 2023-05-04 NOTE — TELEPHONE ENCOUNTER
"Pt is calling as f/u to treatment for hemorrhoids  She was told at her last visit with Dr Dalia Vann to use preparation H TID for 2 weeks  Pt finished this treatment a few days ago, however pain from hemorrhoid seems to be getting worse again  She rates pain at 6/10 with itching  She is not having any bleeding  She is also constipated with a last BM of 05/01/2023  Please call her back with some advice on what to do next  Reason for Disposition   MODERATE-SEVERE rectal pain (i e , interferes with school, work, or sleep)    Answer Assessment - Initial Assessment Questions  1  SYMPTOM:  \"What's the main symptom you're concerned about? \" (e g , pain, itching, swelling, rash)      My internal hemorrhoids are protruding  2  ONSET: \"When did the hemorrhoids  start? \"      2 days ago  3  RECTAL PAIN: \"Do you have any pain around your rectum? \" \"How bad is the pain? \"  (Scale 1-10; or mild, moderate, severe)   - MILD (1-3): doesn't interfere with normal activities    - MODERATE (4-7): interferes with normal activities or awakens from sleep, limping    - SEVERE (8-10): excruciating pain, unable to have a bowel movement       moderate  4  RECTAL ITCHING: \"Do you have any itching in this area? \" \"How bad is the itching? \"  (Scale 1-10; or mild, moderate, severe)   - MILD - doesn't interfere with normal activities    - MODERATE-SEVERE: interferes with normal activities or awakens from sleep     moderate  5  CONSTIPATION: \"Do you have constipation? \" If Yes, ask: \"How bad is it? \"     Yes last BM  05/01/2023  6  CAUSE: \"What do you think is causing the anus symptoms? \"     My hemorrhoids    Protocols used: RECTAL SYMPTOMS-ADULT-AH    "

## 2023-05-04 NOTE — TELEPHONE ENCOUNTER
Please let the patient know that I want to refer her to a colorectal surgeon for further opinion and treatment for her hemorrhoids    Please refer her to Dr Sherman Kirk

## 2023-05-04 NOTE — PROGRESS NOTES
"Daily Note     Today's date: 2023  Patient name: Mimi Matthews  : 1980  MRN: [id number]  Referring provider: Moisés Nobles MD  Dx:   Encounter Diagnosis     ICD-10-CM    1  Flank pain  R10 9       2  Left inguinal pain  R10 32                      Subjective: Patient states she feels good  She states things a getting a little bit better with ADL's  Objective: See treatment diary below      Assessment: Tolerated treatment well  Cont with outlined program  Added SL abd to improve hip stability and control  She reports L sided discomfort in lateral shoulder and hip (hip flexor and great troch region  )Patient demonstrated fatigue post treatment and would benefit from continued PT      Plan: Progress treatment as tolerated         Precautions: Hx of Diverticulitis, fibromyalgia      Manuals    Sidelying L Lat stretch      3x20\"  L 3x20\"L      LAD             STM of Hip flexor Tendon             TPR L QL             Neuro Re-Ed             Pt education      4'       PPT with hip abd             Bridge w/ hip Abd             PPT hip add marlena             PPT march              Piriformis stretch             Pullover 3# 2x10  3# 2x10    RMB  2x12 RMB 2x10 RMB 2x10  3#    2x10 3# 2x10   BKFO 2x10 bilat OTB      2x10 on L grn 2x10 bilat  OTB 2x10 bilat OTB 2x10 bilat OTB 2x10 bilat OTB   SL hip Abd  1x10            Ther Ex             T spine rotation, extension             Thread the needle             Scap retraction             UBE for UE strengthening 5' retro 5' retro    5' retro 5' retro 5' 5' retro 5' retro   Shoulder posterior rolls             T band rows, ext             Multifid press Walk out + lateral stepping OTB above knees  Dbl RTB  5x ea Walk out + lateral stepping OTB above knees  Dbl RTB  5x ea    Dble RTB 10\"x10 Walk out + lateral stepping OTB above knees  Dbl RTB  5x ea  Walk out + lateral stepping OTB above knees  Dbl RTB  5x ea  Walk out + lateral " "stepping OTB above knees  Dbl RTB  5x ea  Walk out + lateral stepping OTB above knees  Dbl RTB  5x ea   TB no monies             T spine sidebending 1/2 kneeling t/s reach and sidebend 10\"x10 1/2 kneeling t/s reach and sidebend 10\"x10    1/2 kneeling t/s reach and sidebend 10\"x10 ea   1/2 kneeling t/s reach and sidebend 10\"x10 1/2 kneeling t/s reach and sidebend 10\"x10 1/2 kneeling t/s reach and sidebend 10\"x10 1/2 kneeling t/s reach and sidebend 10\"x10   QL stretch             Open books 10\"x10 10\"x10    10\"x10 ea 10\"x10 10\"x10 10\"x10 10\"x10    Diagonal Lifts RMB 6x ea  RMB 6x ea    RMB   5x ea RMB 5x ea RMB 5x ea  RMB 5x ea RMB 6x ea   SLR 2x10  2x10     2x10 B 2x10 B 2x10 B 2x10 B 2x10 B   Clamshells  NV     10x 10\"  On L  10\"x10 NV    Ther Activity             Ball carry Ball carry arms extended midway 2 laps Ball carry arms extended midway 2 laps    RMB arms extended midway 2 laps RMB arms extended midway  RMB arms extended midway   2 laps      Heavy carry +March   Suitcase carry 1 lap ea UE 3#        suitcast 3# 20 ft 1 lap ea UE    Gait Training                                       Modalities                                                          "

## 2023-05-04 NOTE — TELEPHONE ENCOUNTER
"Regarding: hemorids  ----- Message from [facility] sent at 5/4/2023 10:29 AM EDT -----  \"My Hemorid seems to be getting bigger  \"    "

## 2023-05-05 NOTE — TELEPHONE ENCOUNTER
Patient is requesting a call back from the office to let them know that her symptoms are getting worse  I did let her know that a referral for colorectal surgeon was entered and mailed to her  I also gave her a number to a colo-rectal surgeon to call    Patient still wants to discuss her symptoms with the clinical team

## 2023-05-05 NOTE — TELEPHONE ENCOUNTER
Pt called in stating she missed a call from the office regarding this matter  She's requesting a call back at [phone]

## 2023-05-08 ENCOUNTER — OFFICE VISIT (OUTPATIENT)
Dept: PHYSICAL THERAPY | Facility: CLINIC | Age: 43
End: 2023-05-08

## 2023-05-08 DIAGNOSIS — R10.9 FLANK PAIN: Primary | ICD-10-CM

## 2023-05-08 DIAGNOSIS — J30.1 SEASONAL ALLERGIC RHINITIS DUE TO POLLEN: ICD-10-CM

## 2023-05-08 DIAGNOSIS — R10.32 LEFT INGUINAL PAIN: ICD-10-CM

## 2023-05-08 RX ORDER — FLUTICASONE PROPIONATE 50 MCG
SPRAY, SUSPENSION (ML) NASAL
Qty: 16 ML | Refills: 0 | Status: SHIPPED | OUTPATIENT
Start: 2023-05-08

## 2023-05-08 NOTE — PROGRESS NOTES
"Daily Note     Today's date: 2023  Patient name: Luan Reis  : 1980  MRN: [id number]  Referring provider: Sanjay Velazquez MD  Dx:   Encounter Diagnosis     ICD-10-CM    1  Flank pain  R10 9       2  Left inguinal pain  R10 32                      Subjective: Patient states slow but cont progress, no new complaints  Objective: See treatment diary below      Assessment: Tolerated treatment well  Progressed reps as charted  No complaints of pain or discomfort  Patient demonstrated fatigue post treatment and would benefit from continued PT      Plan: Progress treatment as tolerated         Precautions: Hx of Diverticulitis, fibromyalgia      Manuals    Sidelying L Lat stretch      3x20\"  L 3x20\"L      LAD             STM of Hip flexor Tendon             TPR L QL             Neuro Re-Ed             Pt education      4'       PPT with hip abd             Bridge w/ hip Abd             PPT hip add marlena             PPT march              Piriformis stretch             Pullover 3# 2x10  3# 2x10 3# 2x10   RMB  2x12 RMB 2x10 RMB 2x10  3#    2x10 3# 2x10   BKFO 2x10 bilat OTB  2x10 bilat GTB 2x10 bilat GTB   2x10 on L grn 2x10 bilat  OTB 2x10 bilat OTB 2x10 bilat OTB 2x10 bilat OTB   SL hip Abd  1x10  NV          Ther Ex             T spine rotation, extension             Thread the needle             Scap retraction             UBE for UE strengthening 5' retro 5' retro 5' retro   5' retro 5' retro 5' 5' retro 5' retro   Shoulder posterior rolls             T band rows, ext             Multifid press Walk out + lateral stepping OTB above knees  Dbl RTB  5x ea Walk out + lateral stepping OTB above knees  Dbl RTB  5x ea    Dble RTB 10\"x10 Walk out + lateral stepping OTB above knees  Dbl RTB  5x ea  Walk out + lateral stepping OTB above knees  Dbl RTB  5x ea  Walk out + lateral stepping OTB above knees  Dbl RTB  5x ea  Walk out + lateral stepping OTB above knees  Dbl " "RTB  5x ea   TB no monies             T spine sidebending 1/2 kneeling t/s reach and sidebend 10\"x10 1/2 kneeling t/s reach and sidebend 10\"x10    1/2 kneeling t/s reach and sidebend 10\"x10 ea   1/2 kneeling t/s reach and sidebend 10\"x10 1/2 kneeling t/s reach and sidebend 10\"x10 1/2 kneeling t/s reach and sidebend 10\"x10 1/2 kneeling t/s reach and sidebend 10\"x10   QL stretch             Open books 10\"x10 10\"x10 10\"x10    10\"x10 ea 10\"x10 10\"x10 10\"x10 10\"x10    Diagonal Lifts RMB 6x ea  RMB 6x ea RMB 7x ea   RMB   5x ea RMB 5x ea RMB 5x ea  RMB 5x ea RMB 6x ea   SLR 2x10  2x10     2x10 B 2x10 B 2x10 B 2x10 B 2x10 B   Clamshells  NV     10x 10\"  On L  10\"x10 NV    Ther Activity             Ball carry Ball carry arms extended midway 2 laps Ball carry arms extended midway 2 laps Ball carry arms extended 2 laps   RMB arms extended midway 2 laps RMB arms extended midway  RMB arms extended midway   2 laps      Heavy carry +March   Suitcase carry 1 lap ea UE 3#  Suitcase carry 1 laps ea UE 3#       suitcast 3# 20 ft 1 lap ea UE    Gait Training                                       Modalities                                                       "

## 2023-05-09 ENCOUNTER — APPOINTMENT (OUTPATIENT)
Dept: PHYSICAL THERAPY | Facility: CLINIC | Age: 43
End: 2023-05-09
Payer: COMMERCIAL

## 2023-05-11 ENCOUNTER — OFFICE VISIT (OUTPATIENT)
Dept: PHYSICAL THERAPY | Facility: CLINIC | Age: 43
End: 2023-05-11

## 2023-05-11 DIAGNOSIS — R10.32 LEFT INGUINAL PAIN: ICD-10-CM

## 2023-05-11 DIAGNOSIS — R10.9 FLANK PAIN: Primary | ICD-10-CM

## 2023-05-11 NOTE — PROGRESS NOTES
"Daily Note     Today's date: 2023  Patient name: Aurora Sutherland  : 1980  MRN: [id number]  Referring provider: Emmy Murdock MD  Dx:   Encounter Diagnosis     ICD-10-CM    1  Flank pain  R10 9       2  Left inguinal pain  R10 32                      Subjective: Patient states she has been feeling pretty good  She still has pains on her L side under arm and hip discomfort  She notes carrying groceries the other day and became very fatigued and needed to sit down to rest        Objective: See treatment diary below      Assessment: Tolerated treatment well  Seems to be limited secondary to decreased endurance  She is challenged with outlined program  Performs reps in cautious controlled manner  She is pushing herself more and making small progressions as charted below  Added hip flexor stretch with good tolerance, no tightness of rectus femoris noted  Patient demonstrated fatigue post treatment and would benefit from continued PT      Plan: Progress treatment as tolerated         Precautions: Hx of Diverticulitis, fibromyalgia      Manuals    Sidelying L Lat stretch      3x20\"  L 3x20\"L      LAD             STM of Hip flexor Tendon             TPR L QL             Neuro Re-Ed             Pt education      4'       PPT with hip abd             Bridge w/ hip Abd             PPT hip add marlena             PPT march              Piriformis stretch             Pullover 3# 2x10  3# 2x10 3# 2x10 3# 2x10  RMB  2x12 RMB 2x10 RMB 2x10  3#    2x10 3# 2x10   BKFO 2x10 bilat OTB  2x10 bilat GTB 2x10 bilat GTB   2x10 on L grn 2x10 bilat  OTB 2x10 bilat OTB 2x10 bilat OTB 2x10 bilat OTB   SL hip Abd  1x10  NV NV         Ther Ex             T spine rotation, extension             Thread the needle             Scap retraction             UBE for UE strengthening 5' retro 5' retro 5' retro 5' retro  5' retro 5' retro 5' 5' retro 5' retro   Shoulder posterior rolls             T band " "rows, ext             Multifid press Walk out + lateral stepping OTB above knees  Dbl RTB  5x ea Walk out + lateral stepping OTB above knees  Dbl RTB  5x ea  Walk out + lateral stepping OTB above knees  Dbl RTB  5x ea  Dble RTB 10\"x10 Walk out + lateral stepping OTB above knees  Dbl RTB  5x ea  Walk out + lateral stepping OTB above knees  Dbl RTB  5x ea  Walk out + lateral stepping OTB above knees  Dbl RTB  5x ea  Walk out + lateral stepping OTB above knees  Dbl RTB  5x ea   TB no monies             T spine sidebending 1/2 kneeling t/s reach and sidebend 10\"x10 1/2 kneeling t/s reach and sidebend 10\"x10  1/2 kneeling t/s reach and sidebend 10\"x10  1/2 kneeling t/s reach and sidebend 10\"x10 ea   1/2 kneeling t/s reach and sidebend 10\"x10 1/2 kneeling t/s reach and sidebend 10\"x10 1/2 kneeling t/s reach and sidebend 10\"x10 1/2 kneeling t/s reach and sidebend 10\"x10   QL stretch             Open books 10\"x10 10\"x10 10\"x10  10\"x10  10\"x10 ea 10\"x10 10\"x10 10\"x10 10\"x10    Diagonal Lifts RMB 6x ea  RMB 6x ea RMB 7x ea RMB 7x ea  RMB   5x ea RMB 5x ea RMB 5x ea  RMB 5x ea RMB 6x ea   SLR 2x10  2x10   2x10   2x10 B 2x10 B 2x10 B 2x10 B 2x10 B   Clamshells  NV  2x10 GTB   10x 10\"  On L  10\"x10 NV    Mod joellen str c strap             Ther Activity             Ball carry Ball carry arms extended midway 2 laps Ball carry arms extended midway 2 laps Ball carry arms extended 2 laps Ball carry arms extended 2 laps  RMB arms extended midway 2 laps RMB arms extended midway  RMB arms extended midway   2 laps      Heavy carry +March   Suitcase carry 1 lap ea UE 3#  Suitcase carry 1 laps ea UE 3#  suitcase carry 2 laps ea UE 3#      suitcast 3# 20 ft 1 lap ea UE    Gait Training                                       Modalities                                                            "

## 2023-05-15 ENCOUNTER — APPOINTMENT (OUTPATIENT)
Dept: PHYSICAL THERAPY | Facility: CLINIC | Age: 43
End: 2023-05-15
Payer: COMMERCIAL

## 2023-05-18 ENCOUNTER — APPOINTMENT (OUTPATIENT)
Dept: PHYSICAL THERAPY | Facility: CLINIC | Age: 43
End: 2023-05-18
Payer: COMMERCIAL

## 2023-06-06 DIAGNOSIS — J30.1 SEASONAL ALLERGIC RHINITIS DUE TO POLLEN: ICD-10-CM

## 2023-06-06 RX ORDER — FLUTICASONE PROPIONATE 50 MCG
SPRAY, SUSPENSION (ML) NASAL
Qty: 16 ML | Refills: 0 | Status: SHIPPED | OUTPATIENT
Start: 2023-06-06

## 2023-06-21 NOTE — PROGRESS NOTES
Diagnoses and all orders for this visit:    Perineal pain       Perineal pain  Patient presents for evaluation of perianal concerns  Patient notes that for quite some time, probably dating back to age 24, she has intermittent anorectal pain, bleeding, burning and itching, abdominal discomfort and change in bowel habits  She has been worked up for this in the past with colonoscopy by gastroenterology  She presents today for evaluation of perianal concerns  Examination shows minimal grade 2 internal hemorrhoidal disease with small perianal skin tags  No significant tenderness is noted  No large prolapsing hemorrhoids or signs of infection or inflammation noted on anoscopy  Patient's symptoms are largely functional   She has some degree of functional bowel disorder  High-fiber diet avoidance of straining and hydration were recommended for this  Topical Calmoseptine offered for pruritic symptoms  Think the role of surgery will be quite limited given her anatomy  At this point in time I recommend nonsurgical treatment  Patient will call if symptoms worsen over time which is evaluated again  HPI     Júnior Bahena is here today referred by Dr Son Buck for evaluation of hemorrhoids  The patient notes painful anal lumps, as well as anal itching and burning, as well as bright red rectal bleeding with bowel movements, noticeable coating the stool and on the tissue paper upon wiping, episodes taking place onJune 2, 9, 16, 24; symptoms started at age 24, worsening over the past year  She reports IBS with episodes of constipation  Her most recent colonoscopy on 1/7/2020 by Dr Rodger Worrell showed: Normal colon and terminal ileum, status post ileal, ascending, descending biopsies  10 year recall  Benign pathology      Past Medical History:   Diagnosis Date   • Anxiety    • Asthma    • Depression    • Femoral hernia with obstruction    • Gonorrhea 11/15/2021   • Irregular menses    • Malaise and fatigue    • Neurogenic pain    • 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[facility] [address] )    • Sleep apnea    • Thymus disorder ([facility] [address] )     last assessed 4/17/14; resolved 7/2/15     Past Surgical History:   Procedure Laterality Date   • DILATION AND CURETTAGE OF UTERUS     • NO PAST SURGERIES     • OSTEOTOMY  12/2020       Current Outpatient Medications:   •  albuterol (PROVENTIL HFA,VENTOLIN HFA) 90 mcg/act inhaler, Inhale, Disp: , Rfl:   •  Bioflavonoid Products ([place]-C) 500-550 MG TABS, Take 1,000 mg by mouth, Disp: , Rfl:   •  Cholecalciferol 50 MCG (2000 UT) CAPS, Take 3 capsules by mouth 70178 units daily liquid, Disp: , Rfl:   •  clotrimazole-betamethasone (LOTRISONE) 1-0 05 % cream, APPLY TOPICALLY 2 (TWO) TIMES A DAY AS NEEDED (ITCHING), Disp: 30 g, Rfl: 0  •  Flarex 0 1 % ophthalmic suspension, INSTILL 1 DROP INTO BOTH EYES 3 TIMES DAILY, Disp: , Rfl:   •  fluticasone (FLONASE) 50 mcg/act nasal spray, INSTILL 1 SPRAY INTO EACH NOSTRIL 2 (TWO) TIMES A DAY WITH MEALS, Disp: 16 mL, Rfl: 0  •  levocetirizine (XYZAL) 5 MG tablet, Take 5 mg by mouth every evening (Patient not taking: Reported on 5/1/2023), Disp: , Rfl:   •  neomycin-polymyxin-dexamethasone (MAXITROL) 0 35%-10,000 units/g-0 1%, LOCATION: LEFT EYE  APPLY A SMALL AMOUNT TO EYELID TWICE DAILY X 5 DAYS (Patient not taking: Reported on 5/1/2023), Disp: , Rfl:   •  Probiotic Product (PROBIOTIC ACIDOPHILUS BEADS PO), Take by mouth (Patient not taking: Reported on 5/1/2023), Disp: , Rfl:   •  zinc gluconate 50 mg tablet, Take 50 mg by mouth daily (Patient not taking: Reported on 6/26/2023), Disp: , Rfl:   Allergies as of 06/26/2023 - Reviewed 06/26/2023   Allergen Reaction Noted   • Amoxicillin Swelling 05/26/2017     Review of Systems   Gastrointestinal: Positive for abdominal pain, anal bleeding and rectal pain  All other systems reviewed and are negative  There were no vitals filed for this visit    Physical Exam  Constitutional:       Appearance: Normal appearance  HENT:      Head: Normocephalic and atraumatic  Eyes:      Extraocular Movements: Extraocular movements intact  Pupils: Pupils are equal, round, and reactive to light  Musculoskeletal:         General: Normal range of motion  Skin:     General: Skin is warm  Neurological:      General: No focal deficit present  Mental Status: She is alert and oriented to person, place, and time  Psychiatric:         Mood and Affect: Mood normal          Thought Content: Thought content normal          Judgment: Judgment normal      Lower Endoscopy    Date/Time: 6/26/2023 2:30 PM    Performed by: Maureen Kim MD  Authorized by: Maureen Kim MD    Verbal consent obtained?: Yes    Risks and benefits: Risks, benefits and alternatives were discussed    Consent given by:  Patient  Patient states understanding of procedure being performed: Yes    Patient identity confirmed:  Verbally with patient  Indications: rectal bleeding    Patient sedated: No    Scope type:   Anoscope  External exam performed: Yes    Pilonidal sinus tract: No    Pilonidal cyst: No    Pilonidal tenderness: No    Perianal skin tags: Yes    Perirectal warts: No    Perianal maceration: No    Perianal induration: No    Perianal erythema: No    External hemorrhoids: No    Digital exam performed: Yes    Laxity of anal sphincter: No    Internal hemorrhoids: Yes    Prolapsed: No    Intraluminal mass: No    Inflammation: No    Anal fissures: No    Anal fistulae: No    Anal stricture: No    Abscess: No    Procedure termination:  Procedure complete  Patient tolerance:  Patient tolerated the procedure well with no immediate complications

## 2023-06-26 ENCOUNTER — OFFICE VISIT (OUTPATIENT)
Age: 43
End: 2023-06-26
Payer: COMMERCIAL

## 2023-06-26 VITALS — WEIGHT: 133 LBS | HEIGHT: 64 IN | BODY MASS INDEX: 22.71 KG/M2

## 2023-06-26 DIAGNOSIS — R10.2 PERINEAL PAIN: Primary | ICD-10-CM

## 2023-06-26 PROCEDURE — 99243 OFF/OP CNSLTJ NEW/EST LOW 30: CPT | Performed by: COLON & RECTAL SURGERY

## 2023-06-26 PROCEDURE — 46600 DIAGNOSTIC ANOSCOPY SPX: CPT | Performed by: COLON & RECTAL SURGERY

## 2023-06-26 NOTE — ASSESSMENT & PLAN NOTE
Patient presents for evaluation of perianal concerns  Patient notes that for quite some time, probably dating back to age 24, she has intermittent anorectal pain, bleeding, burning and itching, abdominal discomfort and change in bowel habits  She has been worked up for this in the past with colonoscopy by gastroenterology  She presents today for evaluation of perianal concerns  Examination shows minimal grade 2 internal hemorrhoidal disease with small perianal skin tags  No significant tenderness is noted  No large prolapsing hemorrhoids or signs of infection or inflammation noted on anoscopy  Patient's symptoms are largely functional   She has some degree of functional bowel disorder  High-fiber diet avoidance of straining and hydration were recommended for this  Topical Calmoseptine offered for pruritic symptoms  Think the role of surgery will be quite limited given her anatomy  At this point in time I recommend nonsurgical treatment  Patient will call if symptoms worsen over time which is evaluated again

## 2023-06-27 ENCOUNTER — APPOINTMENT (EMERGENCY)
Dept: CT IMAGING | Facility: HOSPITAL | Age: 43
End: 2023-06-27
Payer: COMMERCIAL

## 2023-06-27 ENCOUNTER — HOSPITAL ENCOUNTER (EMERGENCY)
Facility: HOSPITAL | Age: 43
Discharge: HOME | End: 2023-06-27
Attending: EMERGENCY MEDICINE
Payer: COMMERCIAL

## 2023-06-27 ENCOUNTER — TELEPHONE (OUTPATIENT)
Age: 43
End: 2023-06-27

## 2023-06-27 VITALS
HEART RATE: 76 BPM | DIASTOLIC BLOOD PRESSURE: 64 MMHG | SYSTOLIC BLOOD PRESSURE: 102 MMHG | RESPIRATION RATE: 18 BRPM | OXYGEN SATURATION: 98 % | TEMPERATURE: 97.1 F

## 2023-06-27 DIAGNOSIS — R51.9 HEADACHE: Primary | ICD-10-CM

## 2023-06-27 DIAGNOSIS — R10.30 LOWER ABDOMINAL PAIN: ICD-10-CM

## 2023-06-27 DIAGNOSIS — E87.6 HYPOKALEMIA: ICD-10-CM

## 2023-06-27 DIAGNOSIS — N39.0 UTI (URINARY TRACT INFECTION): ICD-10-CM

## 2023-06-27 LAB
ALBUMIN SERPL BCP-MCNC: 5 G/DL (ref 3.5–5)
ALP SERPL-CCNC: 72 U/L (ref 34–104)
ALT SERPL W P-5'-P-CCNC: 7 U/L (ref 7–52)
ANION GAP SERPL CALCULATED.3IONS-SCNC: 11 MMOL/L
AST SERPL W P-5'-P-CCNC: 13 U/L (ref 13–39)
BACTERIA UR QL AUTO: ABNORMAL /HPF
BASOPHILS # BLD AUTO: 0.02 THOUSANDS/ÂΜL (ref 0–0.1)
BASOPHILS NFR BLD AUTO: 0 % (ref 0–1)
BILIRUB SERPL-MCNC: 0.58 MG/DL (ref 0.2–1)
BILIRUB UR QL STRIP: NEGATIVE
BUN SERPL-MCNC: 14 MG/DL (ref 5–25)
CALCIUM SERPL-MCNC: 9.7 MG/DL (ref 8.4–10.2)
CHLORIDE SERPL-SCNC: 107 MMOL/L (ref 96–108)
CLARITY UR: ABNORMAL
CO2 SERPL-SCNC: 20 MMOL/L (ref 21–32)
COLOR UR: YELLOW
CREAT SERPL-MCNC: 0.77 MG/DL (ref 0.6–1.3)
EOSINOPHIL # BLD AUTO: 0.18 THOUSAND/ÂΜL (ref 0–0.61)
EOSINOPHIL NFR BLD AUTO: 3 % (ref 0–6)
ERYTHROCYTE [DISTWIDTH] IN BLOOD BY AUTOMATED COUNT: 13.4 % (ref 11.6–15.1)
EXT PREGNANCY TEST URINE: NEGATIVE
EXT. CONTROL: NORMAL
GFR SERPL CREATININE-BSD FRML MDRD: 96 ML/MIN/1.73SQ M
GLUCOSE SERPL-MCNC: 88 MG/DL (ref 65–140)
GLUCOSE UR STRIP-MCNC: NEGATIVE MG/DL
HCT VFR BLD AUTO: 39.3 % (ref 34.8–46.1)
HGB BLD-MCNC: 12.5 G/DL (ref 11.5–15.4)
HGB UR QL STRIP.AUTO: ABNORMAL
IMM GRANULOCYTES # BLD AUTO: 0.01 THOUSAND/UL (ref 0–0.2)
IMM GRANULOCYTES NFR BLD AUTO: 0 % (ref 0–2)
KETONES UR STRIP-MCNC: ABNORMAL MG/DL
LEUKOCYTE ESTERASE UR QL STRIP: NEGATIVE
LIPASE SERPL-CCNC: 14 U/L (ref 11–82)
LYMPHOCYTES # BLD AUTO: 0.8 THOUSANDS/ÂΜL (ref 0.6–4.47)
LYMPHOCYTES NFR BLD AUTO: 14 % (ref 14–44)
MCH RBC QN AUTO: 27.5 PG (ref 26.8–34.3)
MCHC RBC AUTO-ENTMCNC: 31.8 G/DL (ref 31.4–37.4)
MCV RBC AUTO: 87 FL (ref 82–98)
MONOCYTES # BLD AUTO: 0.18 THOUSAND/ÂΜL (ref 0.17–1.22)
MONOCYTES NFR BLD AUTO: 3 % (ref 4–12)
MUCOUS THREADS UR QL AUTO: ABNORMAL
NEUTROPHILS # BLD AUTO: 4.61 THOUSANDS/ÂΜL (ref 1.85–7.62)
NEUTS SEG NFR BLD AUTO: 80 % (ref 43–75)
NITRITE UR QL STRIP: NEGATIVE
NON-SQ EPI CELLS URNS QL MICRO: ABNORMAL /HPF
NRBC BLD AUTO-RTO: 0 /100 WBCS
PH UR STRIP.AUTO: 5.5 [PH]
PLATELET # BLD AUTO: 257 THOUSANDS/UL (ref 149–390)
PMV BLD AUTO: 9.8 FL (ref 8.9–12.7)
POTASSIUM SERPL-SCNC: 3.3 MMOL/L (ref 3.5–5.3)
PROT SERPL-MCNC: 8.1 G/DL (ref 6.4–8.4)
PROT UR STRIP-MCNC: ABNORMAL MG/DL
RBC # BLD AUTO: 4.54 MILLION/UL (ref 3.81–5.12)
RBC #/AREA URNS AUTO: ABNORMAL /HPF
SODIUM SERPL-SCNC: 138 MMOL/L (ref 135–147)
SP GR UR STRIP.AUTO: 1.03 (ref 1–1.03)
UROBILINOGEN UR STRIP-ACNC: <2 MG/DL
WBC # BLD AUTO: 5.8 THOUSAND/UL (ref 4.31–10.16)
WBC #/AREA URNS AUTO: ABNORMAL /HPF

## 2023-06-27 PROCEDURE — 74177 CT ABD & PELVIS W/CONTRAST: CPT

## 2023-06-27 PROCEDURE — 96365 THER/PROPH/DIAG IV INF INIT: CPT

## 2023-06-27 PROCEDURE — 80053 COMPREHEN METABOLIC PANEL: CPT | Performed by: EMERGENCY MEDICINE

## 2023-06-27 PROCEDURE — 36415 COLL VENOUS BLD VENIPUNCTURE: CPT | Performed by: EMERGENCY MEDICINE

## 2023-06-27 PROCEDURE — 83690 ASSAY OF LIPASE: CPT | Performed by: EMERGENCY MEDICINE

## 2023-06-27 PROCEDURE — 99285 EMERGENCY DEPT VISIT HI MDM: CPT | Performed by: EMERGENCY MEDICINE

## 2023-06-27 PROCEDURE — 70450 CT HEAD/BRAIN W/O DYE: CPT

## 2023-06-27 PROCEDURE — 85025 COMPLETE CBC W/AUTO DIFF WBC: CPT | Performed by: EMERGENCY MEDICINE

## 2023-06-27 PROCEDURE — 96366 THER/PROPH/DIAG IV INF ADDON: CPT

## 2023-06-27 PROCEDURE — 99284 EMERGENCY DEPT VISIT MOD MDM: CPT

## 2023-06-27 PROCEDURE — 96375 TX/PRO/DX INJ NEW DRUG ADDON: CPT

## 2023-06-27 PROCEDURE — 81025 URINE PREGNANCY TEST: CPT | Performed by: EMERGENCY MEDICINE

## 2023-06-27 PROCEDURE — 81001 URINALYSIS AUTO W/SCOPE: CPT | Performed by: EMERGENCY MEDICINE

## 2023-06-27 RX ORDER — MAGNESIUM SULFATE 1 G/100ML
1 INJECTION INTRAVENOUS ONCE
Status: COMPLETED | OUTPATIENT
Start: 2023-06-27 | End: 2023-06-27

## 2023-06-27 RX ORDER — DIPHENHYDRAMINE HYDROCHLORIDE 50 MG/ML
25 INJECTION INTRAMUSCULAR; INTRAVENOUS ONCE
Status: COMPLETED | OUTPATIENT
Start: 2023-06-27 | End: 2023-06-27

## 2023-06-27 RX ORDER — ACETAMINOPHEN 325 MG/1
650 TABLET ORAL ONCE
Status: DISCONTINUED | OUTPATIENT
Start: 2023-06-27 | End: 2023-06-28 | Stop reason: HOSPADM

## 2023-06-27 RX ORDER — CEPHALEXIN 500 MG/1
500 CAPSULE ORAL EVERY 12 HOURS SCHEDULED
Qty: 14 CAPSULE | Refills: 0 | Status: SHIPPED | OUTPATIENT
Start: 2023-06-27 | End: 2023-07-04

## 2023-06-27 RX ORDER — METOCLOPRAMIDE HYDROCHLORIDE 5 MG/ML
10 INJECTION INTRAMUSCULAR; INTRAVENOUS ONCE
Status: COMPLETED | OUTPATIENT
Start: 2023-06-27 | End: 2023-06-27

## 2023-06-27 RX ORDER — DEXAMETHASONE SODIUM PHOSPHATE 10 MG/ML
10 INJECTION, SOLUTION INTRAMUSCULAR; INTRAVENOUS ONCE
Status: COMPLETED | OUTPATIENT
Start: 2023-06-27 | End: 2023-06-27

## 2023-06-27 RX ORDER — CEPHALEXIN 250 MG/1
500 CAPSULE ORAL ONCE
Status: COMPLETED | OUTPATIENT
Start: 2023-06-27 | End: 2023-06-27

## 2023-06-27 RX ADMIN — SODIUM CHLORIDE 1000 ML: 0.9 INJECTION, SOLUTION INTRAVENOUS at 19:32

## 2023-06-27 RX ADMIN — MAGNESIUM SULFATE HEPTAHYDRATE 1 G: 1 INJECTION, SOLUTION INTRAVENOUS at 19:30

## 2023-06-27 RX ADMIN — IOHEXOL 100 ML: 350 INJECTION, SOLUTION INTRAVENOUS at 20:49

## 2023-06-27 RX ADMIN — DIPHENHYDRAMINE HYDROCHLORIDE 25 MG: 50 INJECTION, SOLUTION INTRAMUSCULAR; INTRAVENOUS at 19:29

## 2023-06-27 RX ADMIN — DEXAMETHASONE SODIUM PHOSPHATE 10 MG: 10 INJECTION, SOLUTION INTRAMUSCULAR; INTRAVENOUS at 22:29

## 2023-06-27 RX ADMIN — CEPHALEXIN 500 MG: 250 CAPSULE ORAL at 22:29

## 2023-06-27 RX ADMIN — METOCLOPRAMIDE 10 MG: 5 INJECTION, SOLUTION INTRAMUSCULAR; INTRAVENOUS at 19:30

## 2023-06-27 NOTE — ED PROVIDER NOTES
Pt Name: Valeria Jimenez  MRN: [id number]  [place] 1980  Age/Sex: 40 y o  female  Date of evaluation: 6/27/2023  PCP: Lory Green MD    [address] [place] [address]    Chief Complaint   Patient presents with   • Headache     Pt reports hx of migraines  Headache, right sided pain, vomiting, LEFT sided numbness and tingling, all that started around 12 noon  HPI and MDM    40 y o  female presenting with headache that is generated around noon earlier today  Is a right-sided headache, she is also had nausea and vomiting  She states not too long after the headache started she did have some numbness/tingling in her left fingertips  No numbness or tingling elsewhere  She does mention abdominal pain as well, which was her lower abdominal region and left flank  No chest pain or shortness of breath, no urinary symptoms  States she has a history of migraines and this feels fairly similar, she took 2 Motrin without relief  No trauma        Differential diagnosis considered includes but not limited to ***            Medications   metoclopramide (REGLAN) injection 10 mg (has no administration in time range)   diphenhydrAMINE (BENADRYL) injection 25 mg (has no administration in time range)   magnesium sulfate IVPB (premix) SOLN 1 g (has no administration in time range)   acetaminophen (TYLENOL) tablet 650 mg (has no administration in time range)   sodium chloride 0 9 % bolus 1,000 mL (has no administration in time range)         Past Medical and Surgical History    Past Medical History:   Diagnosis Date   • Anxiety    • Asthma    • Depression    • Femoral hernia with obstruction    • Gonorrhea 11/15/2021   • Irregular menses    • Malaise and fatigue    • Neurogenic pain    • 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[facility] )    • Sleep apnea    • Thymus disorder ([facility] )     last assessed 4/17/14; resolved 7/2/15       Past Surgical History:   Procedure Laterality Date   • DILATION AND CURETTAGE OF UTERUS     • NO PAST SURGERIES     • OSTEOTOMY  12/2020       Family History   Problem Relation Age of Onset   • Asthma Mother    • Hyperlipidemia Mother    • Hypertension Mother    • Obesity Mother    • Hyperlipidemia Father    • Hypertension Father    • Diabetes type II Father    • Heart disease Father         valvular       Social History     Tobacco Use   • Smoking status: Never   • Smokeless tobacco: Never   Vaping Use   • Vaping Use: Never used   Substance Use Topics   • Alcohol use: No   • Drug use: No           Allergies    Allergies   Allergen Reactions   • Amoxicillin Swelling       Home Medications    Prior to Admission medications    Medication Sig Start Date End Date Taking? Authorizing Provider   albuterol (PROVENTIL HFA,VENTOLIN HFA) 90 mcg/act inhaler Inhale 5/16/14   Historical Provider, MD   Bioflavonoid Products (Suyapa-C) 500-550 MG TABS Take 1,000 mg by mouth    Historical Provider, MD   Cholecalciferol 50 MCG (2000 UT) CAPS Take 3 capsules by mouth 57048 units daily liquid    Historical Provider, MD   clotrimazole-betamethasone (LOTRISONE) 1-0 05 % cream APPLY TOPICALLY 2 (TWO) TIMES A DAY AS NEEDED (ITCHING) 3/16/23   ALIYA Olivares   Flarex 0 1 % ophthalmic suspension INSTILL 1 DROP INTO BOTH EYES 3 TIMES DAILY 3/20/23   Historical Provider, MD   fluticasone (FLONASE) 50 mcg/act nasal spray INSTILL 1 SPRAY INTO EACH NOSTRIL 2 (TWO) TIMES A DAY WITH MEALS 6/6/23   [place] MD [place]   levocetirizine (XYZAL) 5 MG tablet Take 5 mg by mouth every evening  Patient not taking: Reported on 5/1/2023    Historical Provider, MD   neomycin-polymyxin-dexamethasone (MAXITROL) 0 35%-10,000 units/g-0 1% LOCATION: LEFT EYE   APPLY A SMALL AMOUNT TO EYELID TWICE DAILY X 5 DAYS  Patient not taking: Reported on 5/1/2023 3/7/23   Historical Provider, MD   Probiotic Product (PROBIOTIC ACIDOPHILUS BEADS PO) Take by mouth  Patient not taking: Reported on 5/1/2023    Historical Provider, MD   zinc gluconate 50 mg tablet Take 50 mg by mouth daily  Patient not taking: Reported on 6/26/2023    Historical Provider, MD           Physical Exam      ED Triage Vitals [06/27/23 1806]   Temperature Pulse Respirations Blood Pressure SpO2   (!) 97 1 °F (36 2 °C) 100 18 104/78 96 %      Temp Source Heart Rate Source Patient Position - Orthostatic VS BP Location FiO2 (%)   Temporal Monitor Sitting Left arm --      Pain Score       --               Physical Exam  Constitutional:       General: She is not in acute distress  Appearance: She is not ill-appearing  HENT:      Head: Normocephalic and atraumatic  Nose: Nose normal       Mouth/Throat:      Mouth: Mucous membranes are moist    Eyes:      Extraocular Movements: Extraocular movements intact  Pupils: Pupils are equal, round, and reactive to light  Cardiovascular:      Rate and Rhythm: Normal rate and regular rhythm  Pulmonary:      Effort: No respiratory distress  Breath sounds: Normal breath sounds  No wheezing  Abdominal:      General: There is no distension  Palpations: Abdomen is soft  Comments: Nonspecific lower abdominal tenderness to palpation   Musculoskeletal:         General: No swelling or deformity  Normal range of motion  Cervical back: Normal range of motion and neck supple  Skin:     General: Skin is warm  Findings: No erythema  Neurological:      Mental Status: She is alert and oriented to person, place, and time  Mental status is at baseline  Cranial Nerves: No cranial nerve deficit  Sensory: No sensory deficit  Motor: No weakness                Diagnostic Results      Labs:    Results Reviewed     None          All labs reviewed and utilized in the medical decision making process    Radiology:    No orders to display       All radiology studies independently viewed by me and interpreted by the radiologist     Procedure    Procedures        FINAL IMPRESSION    Final diagnoses:   None         DISPOSITION    ED Disposition     None Follow-up Information    None           PATIENT REFERRED TO:    No follow-up provider specified  DISCHARGE MEDICATIONS:    Patient's Medications   Discharge Prescriptions    No medications on file       No discharge procedures on file  Hal Benítez DO        This note was partially completed using voice recognition technology, and was scanned for gross errors; however some errors may still exist  Please contact the author with any questions or requests for clarification  Phosphatase 72 U/L      Total Protein 8.1 g/dL      Albumin 5.0 g/dL      Total Bilirubin 0.58 mg/dL      eGFR 96 ml/min/1.73sq m     Narrative:      National Kidney Disease Foundation guidelines for Chronic Kidney Disease (CKD):   •  Stage 1 with normal or high GFR (GFR > 90 mL/min/1.73 square meters)  •  Stage 2 Mild CKD (GFR = 60-89 mL/min/1.73 square meters)  •  Stage 3A Moderate CKD (GFR = 45-59 mL/min/1.73 square meters)  •  Stage 3B Moderate CKD (GFR = 30-44 mL/min/1.73 square meters)  •  Stage 4 Severe CKD (GFR = 15-29 mL/min/1.73 square meters)  •  Stage 5 End Stage CKD (GFR <15 mL/min/1.73 square meters)  Note: GFR calculation is accurate only with a steady state creatinine    CBC and differential [[id number]]  (Abnormal) Collected: 06/27/23 1936    Lab Status: Final result Specimen: Blood Updated: 06/27/23 1939     WBC 5.80 Thousand/uL      RBC 4.54 Million/uL      Hemoglobin 12.5 g/dL      Hematocrit 39.3 %      MCV 87 fL      MCH 27.5 pg      MCHC 31.8 g/dL      RDW 13.4 %      MPV 9.8 fL      Platelets 180 Thousands/uL      nRBC 0 /100 WBCs      Neutrophils Relative 80 %      Immat GRANS % 0 %      Lymphocytes Relative 14 %      Monocytes Relative 3 %      Eosinophils Relative 3 %      Basophils Relative 0 %      Neutrophils Absolute 4.61 Thousands/µL      Immature Grans Absolute 0.01 Thousand/uL      Lymphocytes Absolute 0.80 Thousands/µL      Monocytes Absolute 0.18 Thousand/µL      Eosinophils Absolute 0.18 Thousand/µL      Basophils Absolute 0.02 Thousands/µL           All labs reviewed and utilized in the medical decision making process    Radiology:    CT head without contrast   Final Result      No acute intracranial abnormality. Workstation performed: [id number]         CT abdomen pelvis with contrast   Final Result      Small amount of nonspecific lower pelvic free fluid noted which could be physiologic in this young patient.  Otherwise no acute intra-abdominal/pelvic abnormalities noted with findings detailed above. Workstation performed: [id number]             All radiology studies independently viewed by me and interpreted by the radiologist.    Procedure    Procedures        FINAL IMPRESSION    Final diagnoses:   Headache   Lower abdominal pain   UTI (urinary tract infection)   Hypokalemia         DISPOSITION    Time reflects when diagnosis was documented in both MDM as applicable and the Disposition within this note     Time User Action Codes Description Comment    6/27/2023 10:11 PM [facility] Add [R51.9] Headache     6/27/2023 10:11 PM [facility] Add [R10.30] Lower abdominal pain     6/27/2023 10:12 PM [facility] Add [N39.0] UTI (urinary tract infection)     6/27/2023 10:12 PM [facility] Add [E87.6] Hypokalemia       ED Disposition     ED Disposition   Discharge    Condition   Stable    Date/Time   Tue Jun 27, 2023 10:54 PM    Comment   Ben Rivas discharge to home/self care.                Follow-up Information     Follow up With Specialties Details Why Contact Info    Chrystal Mclean MD Internal Medicine, [facility], Palliative Care Call in 1 day  [address] [facility] [address]              PATIENT REFERRED TO:    Chrystal Mclean MD  [address] [address]  [phone]    Call in 1 day        DISCHARGE MEDICATIONS:    Discharge Medication List as of 6/27/2023 10:54 PM      START taking these medications    Details   cephalexin (KEFLEX) 500 mg capsule Take 1 capsule (500 mg total) by mouth every 12 (twelve) hours for 7 days, Starting Tue 6/27/2023, Until Tue 7/4/2023, Normal         CONTINUE these medications which have NOT CHANGED    Details   albuterol (PROVENTIL HFA,VENTOLIN HFA) 90 mcg/act inhaler Inhale, Starting Fri 5/16/2014, Historical Med      Bioflavonoid Products (Suyapa-C) 500-550 MG TABS Take 1,000 mg by mouth, Historical Med      Cholecalciferol 50 MCG (2000 UT) CAPS Take 3 capsules by mouth 60653 units daily liquid, Historical Med      clotrimazole-betamethasone (LOTRISONE) 1-0.05 % cream APPLY TOPICALLY 2 (TWO) TIMES A DAY AS NEEDED (ITCHING), Starting Thu 3/16/2023, Normal      Flarex 0.1 % ophthalmic suspension INSTILL 1 DROP INTO BOTH EYES 3 TIMES DAILY, Historical Med      fluticasone (FLONASE) 50 mcg/act nasal spray INSTILL 1 SPRAY INTO EACH NOSTRIL 2 (TWO) TIMES A DAY WITH MEALS, Normal      levocetirizine (XYZAL) 5 MG tablet Take 5 mg by mouth every evening, Historical Med      neomycin-polymyxin-dexamethasone (MAXITROL) 0.35%-10,000 units/g-0.1% LOCATION: LEFT EYE. APPLY A SMALL AMOUNT TO EYELID TWICE DAILY X 5 DAYS, Historical Med      Probiotic Product (PROBIOTIC ACIDOPHILUS BEADS PO) Take by mouth, Historical Med      zinc gluconate 50 mg tablet Take 50 mg by mouth daily, Historical Med             No discharge procedures on file. Matty Amos DO        This note was partially completed using voice recognition technology, and was scanned for gross errors; however some errors may still exist. Please contact the author with any questions or requests for clarification.       Matty Amos DO  07/16/23 2425

## 2023-06-27 NOTE — TELEPHONE ENCOUNTER
Patient having a headache right side and my left hand - fingers are numb  They're tingling  Left arm is weak  Wants to know what should she do? Rest or I go to the ER? Still nauseous, no fever     Ph [phone]

## 2023-06-29 ENCOUNTER — OFFICE VISIT (OUTPATIENT)
Age: 43
End: 2023-06-29
Payer: COMMERCIAL

## 2023-06-29 VITALS
WEIGHT: 133.6 LBS | HEART RATE: 62 BPM | SYSTOLIC BLOOD PRESSURE: 114 MMHG | RESPIRATION RATE: 16 BRPM | OXYGEN SATURATION: 97 % | DIASTOLIC BLOOD PRESSURE: 62 MMHG | TEMPERATURE: 98.2 F | BODY MASS INDEX: 22.81 KG/M2 | HEIGHT: 64 IN

## 2023-06-29 DIAGNOSIS — F43.10 PTSD (POST-TRAUMATIC STRESS DISORDER): ICD-10-CM

## 2023-06-29 DIAGNOSIS — M79.7 FIBROMYALGIA: ICD-10-CM

## 2023-06-29 DIAGNOSIS — G43.109 MIGRAINE WITH AURA AND WITHOUT STATUS MIGRAINOSUS, NOT INTRACTABLE: Primary | ICD-10-CM

## 2023-06-29 PROBLEM — J02.9 ACUTE VIRAL PHARYNGITIS: Status: RESOLVED | Noted: 2023-05-01 | Resolved: 2023-06-29

## 2023-06-29 RX ORDER — RIZATRIPTAN BENZOATE 5 MG/1
5 TABLET, ORALLY DISINTEGRATING ORAL ONCE AS NEEDED
Qty: 10 TABLET | Refills: 5 | Status: SHIPPED | OUTPATIENT
Start: 2023-06-29

## 2023-06-29 NOTE — PROGRESS NOTES
INTERNAL MEDICINE FOLLOW-UP VISIT  [facility] [place]    NAME: Ben Rivas  AGE: 40 y o  SEX: female  : 1980     DATE: 2023     Assessment and Plan:   1  Migraine with aura and without status migrainosus, not intractable  As needed 1 tablet dissolve under tongue, may repeat in 2 hours if headache is still active  Educated on side effects   If you become pregnant must discontinue medications ASAP  Avoid triggers  Keep a headache diary  - rizatriptan (MAXALT-MLT) 5 mg disintegrating tablet; Take 1 tablet (5 mg total) by mouth once as needed for migraine for up to 1 dose may repeat in 2 hours if necessary  Dispense: 10 tablet; Refill: 5    2  PTSD  Stable a this time, good support system at home    3  Fibromyalgia  Stable at this time  Continues with PT      No follow-ups on file  Chief Complaint:     Chief Complaint   Patient presents with   • Migraine      History of Present Illness:     Headache- went to ER on   CT completed in the ER which was normal  Has a hx of migraine with aura  This headache was the same as previous migraines  Does get an aura of a blue light prior to headaches  Also treated in ER for UTI + in UA - treated with cipro    The following portions of the patient's history were reviewed and updated as appropriate: allergies, current medications, past family history, past medical history, past social history, past surgical history and problem list      Review of Systems:     Review of Systems   Constitutional: Negative for appetite change, chills, diaphoresis, fatigue, fever and unexpected weight change  HENT: Negative for postnasal drip and sneezing  Eyes: Negative for visual disturbance  Respiratory: Negative for chest tightness and shortness of breath  Cardiovascular: Negative for chest pain, palpitations and leg swelling  Gastrointestinal: Negative for abdominal pain and blood in stool     Endocrine: Negative for cold intolerance, heat intolerance, polydipsia, polyphagia and polyuria  Genitourinary: Negative for difficulty urinating, dysuria, frequency and urgency  Musculoskeletal: Negative for arthralgias and myalgias  Skin: Negative for rash and wound  Neurological: Positive for headaches  Negative for dizziness, weakness and light-headedness  Hematological: Negative for adenopathy  Psychiatric/Behavioral: Negative for confusion, dysphoric mood and sleep disturbance  The patient is not nervous/anxious           Past Medical History:     Past Medical History:   Diagnosis Date   • Anxiety    • Asthma    • Depression    • Femoral hernia with obstruction    • Gonorrhea 11/15/2021   • Irregular menses    • Malaise and fatigue    • Neurogenic pain    • 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H[facility])    • Sleep apnea    • Thymus disorder ([facility] [address] [address] )     last assessed 4/17/14; resolved 7/2/15        Current Medications:     Current Outpatient Medications:   •  albuterol (PROVENTIL HFA,VENTOLIN HFA) 90 mcg/act inhaler, Inhale, Disp: , Rfl:   •  Bioflavonoid Products (Suyapa-C) 500-550 MG TABS, Take 1,000 mg by mouth, Disp: , Rfl:   •  cephalexin (KEFLEX) 500 mg capsule, Take 1 capsule (500 mg total) by mouth every 12 (twelve) hours for 7 days, Disp: 14 capsule, Rfl: 0  •  Cholecalciferol 50 MCG (2000 UT) CAPS, Take 3 capsules by mouth 68704 units daily liquid, Disp: , Rfl:   •  clotrimazole-betamethasone (LOTRISONE) 1-0 05 % cream, APPLY TOPICALLY 2 (TWO) TIMES A DAY AS NEEDED (ITCHING), Disp: 30 g, Rfl: 0  •  Flarex 0 1 % ophthalmic suspension, INSTILL 1 DROP INTO BOTH EYES 3 TIMES DAILY, Disp: , Rfl:   •  fluticasone (FLONASE) 50 mcg/act nasal spray, INSTILL 1 SPRAY INTO EACH NOSTRIL 2 (TWO) TIMES A DAY WITH MEALS, Disp: 16 mL, Rfl: 0  •  rizatriptan (MAXALT-MLT) 5 mg disintegrating tablet, Take 1 tablet (5 mg total) by mouth once as needed for migraine for up to 1 dose may repeat in 2 hours if necessary, Disp: 10 tablet, Rfl: "5     Allergies: Allergies   Allergen Reactions   • Amoxicillin Swelling        Physical Exam:     /62 (BP Location: Left arm, Patient Position: Sitting, Cuff Size: Standard)   Pulse 62   Temp 98 2 °F (36 8 °C) (Tympanic)   Resp 16   Ht 5' 4\" (1 626 m)   Wt 60 6 kg (133 lb 9 6 oz)   SpO2 97%   BMI 22 93 kg/m²     Physical Exam  Constitutional:       Appearance: She is well-developed  HENT:      Head: Normocephalic and atraumatic  Eyes:      Pupils: Pupils are equal, round, and reactive to light  Neck:      Thyroid: No thyromegaly  Cardiovascular:      Rate and Rhythm: Normal rate and regular rhythm  Heart sounds: No murmur heard  Pulmonary:      Effort: Pulmonary effort is normal       Breath sounds: Normal breath sounds  Abdominal:      General: Bowel sounds are normal       Palpations: Abdomen is soft  Musculoskeletal:         General: Normal range of motion  Cervical back: Normal range of motion and neck supple  Lymphadenopathy:      Cervical: No cervical adenopathy  Skin:     General: Skin is warm and dry  Neurological:      Mental Status: She is alert and oriented to person, place, and time  Data:     Laboratory Results: I have personally reviewed the pertinent laboratory results/reports   Radiology/Other Diagnostic Testing Results: I have personally reviewed pertinent reports        ALIYA Christensen  [facility]  "

## 2023-07-04 DIAGNOSIS — J30.1 SEASONAL ALLERGIC RHINITIS DUE TO POLLEN: ICD-10-CM

## 2023-07-04 RX ORDER — FLUTICASONE PROPIONATE 50 MCG
SPRAY, SUSPENSION (ML) NASAL
Qty: 16 ML | Refills: 0 | Status: SHIPPED | OUTPATIENT
Start: 2023-07-04

## 2023-07-20 DIAGNOSIS — J30.1 SEASONAL ALLERGIC RHINITIS DUE TO POLLEN: ICD-10-CM

## 2023-07-21 ENCOUNTER — OFFICE VISIT (OUTPATIENT)
Dept: URGENT CARE | Facility: CLINIC | Age: 43
End: 2023-07-21
Payer: COMMERCIAL

## 2023-07-21 VITALS
WEIGHT: 131.2 LBS | SYSTOLIC BLOOD PRESSURE: 128 MMHG | HEART RATE: 86 BPM | RESPIRATION RATE: 18 BRPM | OXYGEN SATURATION: 100 % | BODY MASS INDEX: 22.52 KG/M2 | TEMPERATURE: 98.4 F | DIASTOLIC BLOOD PRESSURE: 78 MMHG

## 2023-07-21 DIAGNOSIS — R30.0 DYSURIA: Primary | ICD-10-CM

## 2023-07-21 LAB
SL AMB  POCT GLUCOSE, UA: NEGATIVE
SL AMB LEUKOCYTE ESTERASE,UA: NEGATIVE
SL AMB POCT BILIRUBIN,UA: NEGATIVE
SL AMB POCT BLOOD,UA: NEGATIVE
SL AMB POCT CLARITY,UA: CLEAR
SL AMB POCT COLOR,UA: NORMAL
SL AMB POCT KETONES,UA: NEGATIVE
SL AMB POCT NITRITE,UA: NEGATIVE
SL AMB POCT PH,UA: 6
SL AMB POCT SPECIFIC GRAVITY,UA: 1.03
SL AMB POCT URINE PROTEIN: NEGATIVE
SL AMB POCT UROBILINOGEN: 0.2

## 2023-07-21 PROCEDURE — 99213 OFFICE O/P EST LOW 20 MIN: CPT | Performed by: PHYSICIAN ASSISTANT

## 2023-07-21 PROCEDURE — 81002 URINALYSIS NONAUTO W/O SCOPE: CPT | Performed by: PHYSICIAN ASSISTANT

## 2023-07-21 PROCEDURE — 87086 URINE CULTURE/COLONY COUNT: CPT | Performed by: PHYSICIAN ASSISTANT

## 2023-07-21 RX ORDER — FLUTICASONE PROPIONATE 50 MCG
SPRAY, SUSPENSION (ML) NASAL
Qty: 16 ML | Refills: 0 | Status: SHIPPED | OUTPATIENT
Start: 2023-07-21

## 2023-07-21 NOTE — PROGRESS NOTES
[facility]        NAME: Simran Resendiz is a 40 y.o. female  : 1980    MRN: [id number]  DATE: 2023  TIME: 2:26 PM      Assessment and Plan     Dysuria [R30.0]  1. Dysuria  POCT urine dip    Urine culture        Note:   Due to negative urine dip, will treat based on culture. If negative, possible differential diagnoses include interstitial cystitis, candidiasis, or BV. Patient Instructions   There are no Patient Instructions on file for this visit. Follow up with PCP in 3-5 days. Go to ER if symptoms worsen. Chief Complaint     Chief Complaint   Patient presents with   • Urinary Frequency     Patient reported that she was in the ED on  for a migraine, but while she was there she was having UTI symptoms. She was treated for UTI, and symptoms slightly resolved but taking abx but then she developed an IBS flare up. Patient reported after she developed a yeast infection. Treated at home, with minimal relief. Pt reported that he burning with urinary and urinary frequency returned. Patient denied any vaginal discharge. Patient reported some pelvic pressure. History of Present Illness     Patient presents with burning with urination and frequency x 1 month. She was prescribed keflex on 2023 for this, which aggravated her IBS and gave her a yeast infection. Then she got her menstrual cycle, so she is not sure if the symptoms ever completely went away. Patient has a history of interstitial cystitis. Review of Systems     Review of Systems   Constitutional: Negative for chills, fatigue and fever. HENT: Negative for congestion, ear pain, postnasal drip, rhinorrhea, sinus pressure, sinus pain and sore throat. Eyes: Negative for pain and visual disturbance. Respiratory: Negative for cough, chest tightness and shortness of breath. Cardiovascular: Negative for chest pain and palpitations.    Gastrointestinal: Negative for abdominal pain, diarrhea, nausea and vomiting. Genitourinary: Positive for dysuria, frequency and urgency. Negative for difficulty urinating, flank pain, hematuria, menstrual problem, pelvic pain and vaginal discharge. Musculoskeletal: Negative for arthralgias, back pain and myalgias. Skin: Negative for rash. Neurological: Negative for dizziness, seizures, syncope, numbness and headaches. All other systems reviewed and are negative.         Current Medications       Current Outpatient Medications:   •  albuterol (PROVENTIL HFA,VENTOLIN HFA) 90 mcg/act inhaler, Inhale, Disp: , Rfl:   •  Bioflavonoid Products (Suyapa-C) 500-550 MG TABS, Take 1,000 mg by mouth, Disp: , Rfl:   •  Cholecalciferol 50 MCG (2000 UT) CAPS, Take 3 capsules by mouth 98566 units daily liquid, Disp: , Rfl:   •  clotrimazole-betamethasone (LOTRISONE) 1-0.05 % cream, APPLY TOPICALLY 2 (TWO) TIMES A DAY AS NEEDED (ITCHING), Disp: 30 g, Rfl: 0  •  fluticasone (FLONASE) 50 mcg/act nasal spray, USE 1 SPRAY IN EACH NOSTRIL TWICE A DAY WITH MEALS, Disp: 16 mL, Rfl: 0  •  rizatriptan (MAXALT-MLT) 5 mg disintegrating tablet, Take 1 tablet (5 mg total) by mouth once as needed for migraine for up to 1 dose may repeat in 2 hours if necessary, Disp: 10 tablet, Rfl: 5  •  erythromycin (ILOTYCIN) ophthalmic ointment, APPLY SMALL AMOUNT TO BOTH EYELIDS EVERY DAY AT BEDTIME (Patient not taking: Reported on 7/21/2023), Disp: , Rfl:   •  Flarex 0.1 % ophthalmic suspension, INSTILL 1 DROP INTO BOTH EYES 3 TIMES DAILY (Patient not taking: Reported on 7/21/2023), Disp: , Rfl:     Current Allergies     Allergies as of 07/21/2023 - Reviewed 07/21/2023   Allergen Reaction Noted   • Amoxicillin Swelling 05/26/2017              The following portions of the patient's history were reviewed and updated as appropriate: allergies, current medications, past family history, past medical history, past social history, past surgical history, and problem list.     Past Medical History:   Diagnosis Date   • Anxiety    • Asthma    • Depression    • Femoral hernia with obstruction    • Gonorrhea 11/15/2021   • Irregular menses    • Malaise and fatigue    • Neurogenic pain    • 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[address])    • Sleep apnea    • Thymus disorder ([address])     last assessed 4/17/14; resolved 7/2/15       Past Surgical History:   Procedure Laterality Date   • DILATION AND CURETTAGE OF UTERUS     • NO PAST SURGERIES     • OSTEOTOMY  12/2020       Family History   Problem Relation Age of Onset   • Asthma Mother    • Hyperlipidemia Mother    • Hypertension Mother    • Obesity Mother    • Hyperlipidemia Father    • Hypertension Father    • Diabetes type II Father    • Heart disease Father         valvular         Medications have been verified. Objective     /78   Pulse 86   Temp 98.4 °F (36.9 °C) (Tympanic)   Resp 18   Wt 59.5 kg (131 lb 3.2 oz)   SpO2 100%   BMI 22.52 kg/m²   No LMP recorded. Physical Exam     Physical Exam  Vitals and nursing note reviewed. Constitutional:       Appearance: Normal appearance. She is normal weight. HENT:      Head: Normocephalic. Cardiovascular:      Rate and Rhythm: Normal rate. Pulmonary:      Effort: Pulmonary effort is normal.   Abdominal:      Tenderness: There is no abdominal tenderness. There is no right CVA tenderness or left CVA tenderness. Skin:     General: Skin is warm and dry. Neurological:      Mental Status: She is alert. Mental status is at baseline. She is disoriented.    Psychiatric:         Mood and Affect: Mood normal.         Behavior: Behavior normal. 16-Mar-2017

## 2023-07-23 LAB — BACTERIA UR CULT: NORMAL

## 2023-07-25 ENCOUNTER — TELEPHONE (OUTPATIENT)
Age: 43
End: 2023-07-25

## 2023-07-25 ENCOUNTER — TELEPHONE (OUTPATIENT)
Dept: URGENT CARE | Facility: CLINIC | Age: 43
End: 2023-07-25

## 2023-07-25 DIAGNOSIS — R30.0 DYSURIA: Primary | ICD-10-CM

## 2023-07-25 RX ORDER — NITROFURANTOIN 25; 75 MG/1; MG/1
100 CAPSULE ORAL 2 TIMES DAILY
Qty: 14 CAPSULE | Refills: 0 | Status: SHIPPED | OUTPATIENT
Start: 2023-07-25 | End: 2023-08-01

## 2023-07-25 RX ORDER — FLUCONAZOLE 150 MG/1
150 TABLET ORAL ONCE
Qty: 1 TABLET | Refills: 0 | Status: SHIPPED | OUTPATIENT
Start: 2023-07-25 | End: 2023-07-25

## 2023-07-25 NOTE — TELEPHONE ENCOUNTER
Pt had labs done at [facility] on 7/21  Urine and a POCT dip  She wanted to know why no one called her about the results    She needs medication     She wanted to speak to a manager

## 2023-07-25 NOTE — TELEPHONE ENCOUNTER
Pt called wanting to review results of urine culture. Discussed mixed contaminants. No specific infection seen. Pt still symptomatic. Pt very frustrated that the sx persist. Pt concerned for yeast infection. Pt appears hesistant to go to OBGYN. I told patient I will write for macrobid and a one time dose of diflucan however if she continues to have symtoms she should go to OBGYN. She states she understands and agrees to plan.

## 2023-08-08 ENCOUNTER — APPOINTMENT (OUTPATIENT)
Age: 43
End: 2023-08-08
Payer: COMMERCIAL

## 2023-08-10 ENCOUNTER — OFFICE VISIT (OUTPATIENT)
Age: 43
End: 2023-08-10
Payer: COMMERCIAL

## 2023-08-10 ENCOUNTER — APPOINTMENT (OUTPATIENT)
Age: 43
End: 2023-08-10
Payer: COMMERCIAL

## 2023-08-10 VITALS
WEIGHT: 132 LBS | DIASTOLIC BLOOD PRESSURE: 60 MMHG | SYSTOLIC BLOOD PRESSURE: 110 MMHG | OXYGEN SATURATION: 98 % | BODY MASS INDEX: 22.53 KG/M2 | HEIGHT: 64 IN | HEART RATE: 93 BPM

## 2023-08-10 DIAGNOSIS — R10.2 PELVIC PAIN: ICD-10-CM

## 2023-08-10 DIAGNOSIS — N92.6 IRREGULAR PERIODS: ICD-10-CM

## 2023-08-10 DIAGNOSIS — N30.10 INTERSTITIAL CYSTITIS: ICD-10-CM

## 2023-08-10 DIAGNOSIS — N92.6 IRREGULAR PERIODS: Primary | ICD-10-CM

## 2023-08-10 PROCEDURE — 86780 TREPONEMA PALLIDUM: CPT

## 2023-08-10 PROCEDURE — 87491 CHLMYD TRACH DNA AMP PROBE: CPT

## 2023-08-10 PROCEDURE — 81001 URINALYSIS AUTO W/SCOPE: CPT

## 2023-08-10 PROCEDURE — 84703 CHORIONIC GONADOTROPIN ASSAY: CPT

## 2023-08-10 PROCEDURE — 87591 N.GONORRHOEAE DNA AMP PROB: CPT

## 2023-08-10 PROCEDURE — 87086 URINE CULTURE/COLONY COUNT: CPT

## 2023-08-10 PROCEDURE — 99214 OFFICE O/P EST MOD 30 MIN: CPT

## 2023-08-10 PROCEDURE — 36415 COLL VENOUS BLD VENIPUNCTURE: CPT

## 2023-08-10 NOTE — PROGRESS NOTES
INTERNAL MEDICINE FOLLOW-UP VISIT  [facility] - [facility] [place]    NAME: Ben Rivas  AGE: 40 y.o. SEX: female  : 1980     DATE: 8/10/2023     Assessment and Plan:   1. Irregular periods  Pregnancy test today  Follows with gynecology    2. Interstitial cystitis  Continues with pelvic 'warmth" some burning with urination  Went to ER in July and had full workup and CT scan  No fevers, no hematuria   UA, culture and STD- currently has menstrual cycle       No follow-ups on file. Chief Complaint:     No chief complaint on file. History of Present Illness:     Migraines are 2-3 per week. Rest, avoid light, stay hydrated and motrin. Not needed the triptan. Continues with pelvic "warmth" and pelvic pain. No hematuria, some burning with urination       The following portions of the patient's history were reviewed and updated as appropriate: allergies, current medications, past family history, past medical history, past social history, past surgical history and problem list.     Review of Systems:     Review of Systems   Constitutional: Negative for appetite change, chills, diaphoresis, fatigue, fever and unexpected weight change. HENT: Negative for postnasal drip and sneezing. Eyes: Negative for visual disturbance. Respiratory: Negative for chest tightness and shortness of breath. Cardiovascular: Negative for chest pain, palpitations and leg swelling. Gastrointestinal: Negative for abdominal pain and blood in stool. Endocrine: Negative for cold intolerance, heat intolerance, polydipsia, polyphagia and polyuria. Genitourinary: Positive for pelvic pain. Negative for difficulty urinating, dysuria, frequency and urgency. Musculoskeletal: Negative for arthralgias and myalgias. Skin: Negative for rash and wound. Neurological: Positive for headaches. Negative for dizziness, weakness and light-headedness. Hematological: Negative for adenopathy. Psychiatric/Behavioral: Negative for confusion, dysphoric mood and sleep disturbance. The patient is not nervous/anxious. Past Medical History:     Past Medical History:   Diagnosis Date   • Anxiety    • Asthma    • Depression    • Femoral hernia with obstruction    • Gonorrhea 11/15/2021   • Irregular menses    • Malaise and fatigue    • Neurogenic pain    • 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[address])    • Sleep apnea    • Thymus disorder ([address])     last assessed 4/17/14; resolved 7/2/15        Current Medications:     Current Outpatient Medications:   •  albuterol (PROVENTIL HFA,VENTOLIN HFA) 90 mcg/act inhaler, Inhale, Disp: , Rfl:   •  Bioflavonoid Products (Suyapa-C) 500-550 MG TABS, Take 1,000 mg by mouth, Disp: , Rfl:   •  Cholecalciferol 50 MCG (2000 UT) CAPS, Take 3 capsules by mouth 44916 units daily liquid, Disp: , Rfl:   •  clotrimazole-betamethasone (LOTRISONE) 1-0.05 % cream, APPLY TOPICALLY 2 (TWO) TIMES A DAY AS NEEDED (ITCHING), Disp: 30 g, Rfl: 0  •  fluticasone (FLONASE) 50 mcg/act nasal spray, USE 1 SPRAY IN EACH NOSTRIL TWICE A DAY WITH MEALS, Disp: 16 mL, Rfl: 0  •  rizatriptan (MAXALT-MLT) 5 mg disintegrating tablet, Take 1 tablet (5 mg total) by mouth once as needed for migraine for up to 1 dose may repeat in 2 hours if necessary, Disp: 10 tablet, Rfl: 5  •  erythromycin (ILOTYCIN) ophthalmic ointment, APPLY SMALL AMOUNT TO BOTH EYELIDS EVERY DAY AT BEDTIME (Patient not taking: Reported on 7/21/2023), Disp: , Rfl:   •  Flarex 0.1 % ophthalmic suspension, INSTILL 1 DROP INTO BOTH EYES 3 TIMES DAILY (Patient not taking: Reported on 7/21/2023), Disp: , Rfl:      Allergies:      Allergies   Allergen Reactions   • Amoxicillin Swelling        Physical Exam:     /60 (BP Location: Left arm, Patient Position: Sitting, Cuff Size: Standard)   Pulse 93   Ht 5' 4" (1.626 m)   Wt 59.9 kg (132 lb)   SpO2 98%   BMI 22.66 kg/m²     Physical Exam  Constitutional:       Appearance: She is well-developed. HENT:      Head: Normocephalic and atraumatic. Eyes:      Pupils: Pupils are equal, round, and reactive to light. Neck:      Thyroid: No thyromegaly. Cardiovascular:      Rate and Rhythm: Normal rate and regular rhythm. Heart sounds: No murmur heard. Pulmonary:      Effort: Pulmonary effort is normal.      Breath sounds: Normal breath sounds. Abdominal:      General: Bowel sounds are normal.      Palpations: Abdomen is soft. Musculoskeletal:         General: Normal range of motion. Cervical back: Normal range of motion and neck supple. Lymphadenopathy:      Cervical: No cervical adenopathy. Skin:     General: Skin is warm and dry. Neurological:      Mental Status: She is alert and oriented to person, place, and time. Data:     Laboratory Results: I have personally reviewed the pertinent laboratory results/reports   Radiology/Other Diagnostic Testing Results: I have personally reviewed pertinent reports.       ALIYA Boo  [facility] [place]

## 2023-08-11 ENCOUNTER — TELEPHONE (OUTPATIENT)
Age: 43
End: 2023-08-11

## 2023-08-11 LAB
BACTERIA UR CULT: NORMAL
BACTERIA UR QL AUTO: ABNORMAL /HPF
BILIRUB UR QL STRIP: NEGATIVE
C TRACH DNA SPEC QL NAA+PROBE: NEGATIVE
CAOX CRY URNS QL MICRO: ABNORMAL /HPF
CLARITY UR: ABNORMAL
COLOR UR: ABNORMAL
GLUCOSE UR STRIP-MCNC: NEGATIVE MG/DL
HCG SERPL QL: NEGATIVE
HGB UR QL STRIP.AUTO: ABNORMAL
HYALINE CASTS #/AREA URNS LPF: ABNORMAL /LPF
KETONES UR STRIP-MCNC: ABNORMAL MG/DL
LEUKOCYTE ESTERASE UR QL STRIP: ABNORMAL
MUCOUS THREADS UR QL AUTO: ABNORMAL
N GONORRHOEA DNA SPEC QL NAA+PROBE: NEGATIVE
NITRITE UR QL STRIP: NEGATIVE
NON-SQ EPI CELLS URNS QL MICRO: ABNORMAL /HPF
PH UR STRIP.AUTO: 6 [PH]
PROT UR STRIP-MCNC: ABNORMAL MG/DL
RBC #/AREA URNS AUTO: ABNORMAL /HPF
SP GR UR STRIP.AUTO: 1.03 (ref 1–1.03)
TREPONEMA PALLIDUM IGG+IGM AB [PRESENCE] IN SERUM OR PLASMA BY IMMUNOASSAY: NORMAL
UROBILINOGEN UR STRIP-ACNC: <2 MG/DL
WBC #/AREA URNS AUTO: ABNORMAL /HPF

## 2023-08-11 NOTE — TELEPHONE ENCOUNTER
----- Message from [address] sent at 8/11/2023  8:44 AM EDT -----  We will wait for the culture to come back as it does not appear you have a urinary tract infection

## 2023-08-14 ENCOUNTER — TELEPHONE (OUTPATIENT)
Age: 43
End: 2023-08-14

## 2023-08-14 NOTE — TELEPHONE ENCOUNTER
----- Message from [address] sent at 8/14/2023 11:11 AM EDT -----  Urine is normal

## 2023-09-22 DIAGNOSIS — J30.1 SEASONAL ALLERGIC RHINITIS DUE TO POLLEN: ICD-10-CM

## 2023-09-22 RX ORDER — FLUTICASONE PROPIONATE 50 MCG
SPRAY, SUSPENSION (ML) NASAL
Qty: 16 ML | Refills: 0 | Status: SHIPPED | OUTPATIENT
Start: 2023-09-22

## 2023-10-30 DIAGNOSIS — J30.1 SEASONAL ALLERGIC RHINITIS DUE TO POLLEN: ICD-10-CM

## 2023-10-30 RX ORDER — FLUTICASONE PROPIONATE 50 MCG
SPRAY, SUSPENSION (ML) NASAL
Qty: 16 ML | Refills: 0 | Status: SHIPPED | OUTPATIENT
Start: 2023-10-30

## 2023-11-15 ENCOUNTER — TELEPHONE (OUTPATIENT)
Age: 43
End: 2023-11-15

## 2023-11-15 NOTE — TELEPHONE ENCOUNTER
Hello, my name is Herminio Sunshine. My phone number is [phone]. My eye doctor called. She believes I need to see a neurologist for an MRI for my migraine. Please call me back. My phone number is [phone]. Thank you.

## 2023-11-16 ENCOUNTER — HOSPITAL ENCOUNTER (OUTPATIENT)
Dept: RADIOLOGY | Facility: HOSPITAL | Age: 43
End: 2023-11-16
Payer: COMMERCIAL

## 2023-11-16 ENCOUNTER — OFFICE VISIT (OUTPATIENT)
Age: 43
End: 2023-11-16
Payer: COMMERCIAL

## 2023-11-16 VITALS
SYSTOLIC BLOOD PRESSURE: 110 MMHG | BODY MASS INDEX: 22.06 KG/M2 | OXYGEN SATURATION: 100 % | HEART RATE: 67 BPM | WEIGHT: 129.2 LBS | RESPIRATION RATE: 16 BRPM | DIASTOLIC BLOOD PRESSURE: 76 MMHG | HEIGHT: 64 IN

## 2023-11-16 DIAGNOSIS — R29.898 LEFT ARM WEAKNESS: ICD-10-CM

## 2023-11-16 DIAGNOSIS — R26.89 BALANCE PROBLEM: ICD-10-CM

## 2023-11-16 DIAGNOSIS — G43.109 MIGRAINE WITH AURA AND WITHOUT STATUS MIGRAINOSUS, NOT INTRACTABLE: Primary | ICD-10-CM

## 2023-11-16 DIAGNOSIS — R25.2 JERKING MOVEMENTS OF EXTREMITIES: ICD-10-CM

## 2023-11-16 PROCEDURE — 72072 X-RAY EXAM THORAC SPINE 3VWS: CPT

## 2023-11-16 PROCEDURE — 72050 X-RAY EXAM NECK SPINE 4/5VWS: CPT

## 2023-11-16 PROCEDURE — 99214 OFFICE O/P EST MOD 30 MIN: CPT

## 2023-11-16 NOTE — PROGRESS NOTES
INTERNAL MEDICINE FOLLOW-UP VISIT  [facility] - [facility] INTERNAL MEDICINE [address]    NAME: Ben Rivas  AGE: 44 y.o. SEX: female  : 1980     DATE: 2023     Assessment and Plan:   1. Migraine with aura and without status migrainosus, not intractable  Discussed referral to neurology at this time. Start B6 and magnesium supplements daily. She is refraining from the rizatriptan at this time because she is actively trying to get pregnant. Discussed continuing to keep a migraine log. Recent CT of brain in  showed no abnormalities. 2. Left arm weakness  Cervical and thoracic x-rays ordered to rule out any neck or back cause. EMG ordered, but will wait until x-rays are back. May need an MRI. She denies any facial drooping, changes in speech, severe headache, visual disturbances, CP, or SOB. 3.  Jerking movements of extremities  Labs ordered to rule out any infectious, inflammatory, or electrolyte abnormality. 4.  Balance problem  Her gait was normal. She may benefit from neuro PT. Rule out any neurologic cause first.        No follow-ups on file. Chief Complaint:     No chief complaint on file. History of Present Illness:   Patient is a 43-year-old female that presents today after her eye doctor appointment. During the eye doctor appointment they flushed a bright light and she started to jerk. It subsided after a couple seconds. They recommended that she has an MRI and neurology consult. She notes that anytime she is driving at night and an oncoming car comes with bright lights, she jerks. It sometimes happens with loud sounds as well. She has a history of migraines since . They happen almost every other day. She does have an aura which includes bright lights. She is prescribed rizatriptan but has stayed away from it because she is actively trying to get pregnant. She has had a migraine that has been on and off since last Friday.   It started on the left side of her face and has since moved to the right. She does note when she has the migraine she is photophobic, phonophobic, and sensitive to smells. Most of the time she just takes Motrin and lays down. She does keep a log of her headaches. Rye bread and heat are common triggers. At the headache can vary from which side it affects. She has taken B6 over the summer, but has not started magnesium yet. She did have a fall last September onto her left side. Since then she notes left shoulder to tip of the finger weakness. There is some tingling and numbness as well. It happens when she is doing household activities like vacuuming, doing dishes, scrubbing the shower. She was doing PT in the spring which she thinks may have helped. She also has had trouble with her balance for years. She denies a family history of any genetic disorders. The following portions of the patient's history were reviewed and updated as appropriate: allergies, current medications, past family history, past medical history, past social history, past surgical history and problem list.     Review of Systems:     Review of Systems   Constitutional:  Negative for chills, fatigue and fever. HENT:  Negative for ear discharge, ear pain, postnasal drip, rhinorrhea, sinus pressure, sinus pain, sore throat, tinnitus and trouble swallowing. Eyes:  Positive for visual disturbance (Blurriness). Negative for pain, discharge and itching. Respiratory:  Negative for cough, shortness of breath and wheezing. Cardiovascular:  Negative for chest pain, palpitations and leg swelling. Gastrointestinal:  Negative for abdominal pain, constipation, diarrhea, nausea and vomiting. Endocrine: Negative for polydipsia, polyphagia and polyuria. Genitourinary:  Negative for difficulty urinating, frequency, hematuria and urgency. Musculoskeletal:  Negative for arthralgias, joint swelling and myalgias.    Skin:  Negative for color change. Allergic/Immunologic: Negative for environmental allergies. Neurological:  Positive for weakness and numbness. Negative for dizziness, light-headedness and headaches. Hematological:  Negative for adenopathy. Psychiatric/Behavioral:  Negative for decreased concentration and sleep disturbance. The patient is not nervous/anxious. Past Medical History:     Past Medical History:   Diagnosis Date    Allergic     Anxiety     Asthma     Depression     Femoral hernia with obstruction     Gonorrhea 11/15/2021    Headache(784.0) 11/10/2023    Irregular menses     Malaise and fatigue     Neurogenic pain     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[facility])     Sleep apnea     Thymus disorder ([address])     last assessed 4/17/14; resolved 7/2/15        Current Medications:     Current Outpatient Medications:     albuterol (PROVENTIL HFA,VENTOLIN HFA) 90 mcg/act inhaler, Inhale, Disp: , Rfl:     Bioflavonoid Products (Suyapa-C) 500-550 MG TABS, Take 1,000 mg by mouth, Disp: , Rfl:     Cholecalciferol 50 MCG (2000 UT) CAPS, Take 3 capsules by mouth 72058 units daily liquid, Disp: , Rfl:     clotrimazole-betamethasone (LOTRISONE) 1-0.05 % cream, APPLY TOPICALLY 2 (TWO) TIMES A DAY AS NEEDED (ITCHING), Disp: 30 g, Rfl: 0    erythromycin (ILOTYCIN) ophthalmic ointment, , Disp: , Rfl:     Flarex 0.1 % ophthalmic suspension, , Disp: , Rfl:     fluticasone (FLONASE) 50 mcg/act nasal spray, USE 1 SPRAY IN EACH NOSTRIL TWICE A DAY WITH MEALS, Disp: 16 mL, Rfl: 0    rizatriptan (MAXALT-MLT) 5 mg disintegrating tablet, Take 1 tablet (5 mg total) by mouth once as needed for migraine for up to 1 dose may repeat in 2 hours if necessary, Disp: 10 tablet, Rfl: 5     Allergies:      Allergies   Allergen Reactions    Amoxicillin Swelling        Physical Exam:     /76 (BP Location: Left arm, Patient Position: Sitting, Cuff Size: Adult)   Pulse 67   Resp 16   Ht 5' 4" (1.626 m)   Wt 58.6 kg (129 lb 3.2 oz)   SpO2 100%   BMI 22.18 kg/m²     Physical Exam  Vitals and nursing note reviewed. Constitutional:       General: She is awake. She is not in acute distress. Appearance: Normal appearance. She is well-developed, well-groomed and normal weight. HENT:      Head: Normocephalic and atraumatic. Right Ear: Hearing and external ear normal.      Left Ear: Hearing and external ear normal.      Nose: Nose normal.      Mouth/Throat:      Lips: Pink. Mouth: Mucous membranes are moist.   Eyes:      General: Lids are normal. Vision grossly intact. Gaze aligned appropriately. Conjunctiva/sclera: Conjunctivae normal.   Neck:      Vascular: No carotid bruit. Trachea: Trachea and phonation normal.   Cardiovascular:      Rate and Rhythm: Normal rate and regular rhythm. Heart sounds: Normal heart sounds, S1 normal and S2 normal. No murmur heard. No friction rub. No gallop. Pulmonary:      Effort: Pulmonary effort is normal. No respiratory distress. Breath sounds: Normal breath sounds and air entry. No decreased breath sounds, wheezing, rhonchi or rales. Abdominal:      General: Abdomen is flat. Musculoskeletal:         General: No swelling. Cervical back: Neck supple. Right lower leg: No edema. Left lower leg: No edema. Skin:     General: Skin is warm. Capillary Refill: Capillary refill takes less than 2 seconds. Neurological:      Mental Status: She is alert. Sensory: Sensation is intact. Motor: Weakness present. Coordination: Coordination is intact. Gait: Gait is intact. Deep Tendon Reflexes:      Reflex Scores:       Brachioradialis reflexes are 1+ on the right side and 3+ on the left side. Patellar reflexes are 2+ on the right side and 2+ on the left side. Comments: Left hand  strength weaker than right hand  strength.    Psychiatric:         Attention and Perception: Attention and perception normal.         Mood and Affect: Mood and affect normal.         Speech: Speech normal.         Behavior: Behavior normal. Behavior is cooperative. Thought Content: Thought content normal.         Cognition and Memory: Cognition and memory normal.         Judgment: Judgment normal.           Data:     Laboratory Results: I have personally reviewed the pertinent laboratory results/reports   Radiology/Other Diagnostic Testing Results: I have personally reviewed pertinent reports.       Jerel Little PA-C  [facility] INTERNAL MEDICINE [facility]

## 2023-11-20 ENCOUNTER — TELEPHONE (OUTPATIENT)
Age: 43
End: 2023-11-20

## 2023-11-20 ENCOUNTER — APPOINTMENT (OUTPATIENT)
Dept: LAB | Facility: CLINIC | Age: 43
End: 2023-11-20
Payer: COMMERCIAL

## 2023-11-20 DIAGNOSIS — R25.2 JERKING MOVEMENTS OF EXTREMITIES: ICD-10-CM

## 2023-11-20 DIAGNOSIS — R29.898 LEFT ARM WEAKNESS: ICD-10-CM

## 2023-11-20 DIAGNOSIS — R26.89 BALANCE PROBLEM: ICD-10-CM

## 2023-11-20 LAB
ALBUMIN SERPL BCP-MCNC: 4.7 G/DL (ref 3.5–5)
ALP SERPL-CCNC: 61 U/L (ref 34–104)
ALT SERPL W P-5'-P-CCNC: 15 U/L (ref 7–52)
ANION GAP SERPL CALCULATED.3IONS-SCNC: 9 MMOL/L
AST SERPL W P-5'-P-CCNC: 16 U/L (ref 13–39)
B BURGDOR IGG+IGM SER QL IA: NEGATIVE
BASOPHILS # BLD AUTO: 0.03 THOUSANDS/ÂΜL (ref 0–0.1)
BASOPHILS NFR BLD AUTO: 1 % (ref 0–1)
BILIRUB SERPL-MCNC: 0.49 MG/DL (ref 0.2–1)
BUN SERPL-MCNC: 13 MG/DL (ref 5–25)
CALCIUM SERPL-MCNC: 10.8 MG/DL (ref 8.4–10.2)
CHLORIDE SERPL-SCNC: 107 MMOL/L (ref 96–108)
CK SERPL-CCNC: 86 U/L (ref 26–192)
CO2 SERPL-SCNC: 24 MMOL/L (ref 21–32)
CREAT SERPL-MCNC: 0.78 MG/DL (ref 0.6–1.3)
CRP SERPL QL: <1 MG/L
EOSINOPHIL # BLD AUTO: 0.01 THOUSAND/ÂΜL (ref 0–0.61)
EOSINOPHIL NFR BLD AUTO: 0 % (ref 0–6)
ERYTHROCYTE [DISTWIDTH] IN BLOOD BY AUTOMATED COUNT: 13.7 % (ref 11.6–15.1)
ERYTHROCYTE [SEDIMENTATION RATE] IN BLOOD: 24 MM/HOUR (ref 0–19)
GFR SERPL CREATININE-BSD FRML MDRD: 94 ML/MIN/1.73SQ M
GLUCOSE SERPL-MCNC: 86 MG/DL (ref 65–140)
HCT VFR BLD AUTO: 42.5 % (ref 34.8–46.1)
HGB BLD-MCNC: 13.8 G/DL (ref 11.5–15.4)
IMM GRANULOCYTES # BLD AUTO: 0.01 THOUSAND/UL (ref 0–0.2)
IMM GRANULOCYTES NFR BLD AUTO: 0 % (ref 0–2)
LDH SERPL-CCNC: 154 U/L (ref 140–271)
LYMPHOCYTES # BLD AUTO: 1.53 THOUSANDS/ÂΜL (ref 0.6–4.47)
LYMPHOCYTES NFR BLD AUTO: 26 % (ref 14–44)
MAGNESIUM SERPL-MCNC: 2 MG/DL (ref 1.9–2.7)
MCH RBC QN AUTO: 27.5 PG (ref 26.8–34.3)
MCHC RBC AUTO-ENTMCNC: 32.5 G/DL (ref 31.4–37.4)
MCV RBC AUTO: 85 FL (ref 82–98)
MONOCYTES # BLD AUTO: 0.43 THOUSAND/ÂΜL (ref 0.17–1.22)
MONOCYTES NFR BLD AUTO: 7 % (ref 4–12)
NEUTROPHILS # BLD AUTO: 3.94 THOUSANDS/ÂΜL (ref 1.85–7.62)
NEUTS SEG NFR BLD AUTO: 66 % (ref 43–75)
NRBC BLD AUTO-RTO: 0 /100 WBCS
PHOSPHATE SERPL-MCNC: 2.2 MG/DL (ref 2.7–4.5)
PLATELET # BLD AUTO: 276 THOUSANDS/UL (ref 149–390)
PMV BLD AUTO: 11.3 FL (ref 8.9–12.7)
POTASSIUM SERPL-SCNC: 3.6 MMOL/L (ref 3.5–5.3)
PROT SERPL-MCNC: 7.6 G/DL (ref 6.4–8.4)
RBC # BLD AUTO: 5.02 MILLION/UL (ref 3.81–5.12)
SODIUM SERPL-SCNC: 140 MMOL/L (ref 135–147)
URATE SERPL-MCNC: 2.8 MG/DL (ref 2–7.5)
WBC # BLD AUTO: 5.95 THOUSAND/UL (ref 4.31–10.16)

## 2023-11-20 PROCEDURE — 84100 ASSAY OF PHOSPHORUS: CPT

## 2023-11-20 PROCEDURE — 86618 LYME DISEASE ANTIBODY: CPT

## 2023-11-20 PROCEDURE — 83615 LACTATE (LD) (LDH) ENZYME: CPT

## 2023-11-20 PROCEDURE — 36415 COLL VENOUS BLD VENIPUNCTURE: CPT

## 2023-11-20 PROCEDURE — 85652 RBC SED RATE AUTOMATED: CPT

## 2023-11-20 PROCEDURE — 80053 COMPREHEN METABOLIC PANEL: CPT

## 2023-11-20 PROCEDURE — 85025 COMPLETE CBC W/AUTO DIFF WBC: CPT

## 2023-11-20 PROCEDURE — 83735 ASSAY OF MAGNESIUM: CPT

## 2023-11-20 PROCEDURE — 84550 ASSAY OF BLOOD/URIC ACID: CPT

## 2023-11-20 PROCEDURE — 82550 ASSAY OF CK (CPK): CPT

## 2023-11-20 PROCEDURE — 86140 C-REACTIVE PROTEIN: CPT

## 2023-11-20 NOTE — TELEPHONE ENCOUNTER
Patient called and wanted to know where to go for her labs and she was calling to get her results from the x-rays.  Patient has been provided her results as per Lawson Dominguez

## 2023-11-20 NOTE — TELEPHONE ENCOUNTER
Patient called and wanted to know where to go for her labs and she was calling to get her results from the x-rays.  Patient has been provided her results as per Demi Johnson

## 2023-11-20 NOTE — TELEPHONE ENCOUNTER
----- Message from Sin Chapa PA-C sent at 11/20/2023  8:01 AM EST -----  Both the thoracic and cervical spine x-rays are normal.  You can go ahead and schedule the EMG now.

## 2023-11-21 ENCOUNTER — TELEPHONE (OUTPATIENT)
Age: 43
End: 2023-11-21

## 2023-11-21 NOTE — TELEPHONE ENCOUNTER
----- Message from Maida Hidalgo PA-C sent at 11/21/2023  8:12 AM EST -----  Your phosphorus is slightly low, you can increase this with foods like chicken, turkey, dairy, quinoa, seafood, and nuts. Calcium is elevated, but when corrected it is normal.  Your sed rate, which is an inflammatory marker, is slightly elevated, bu  t it could just be reactive to any inflammation or infection that you have going on.

## 2023-12-07 ENCOUNTER — TELEPHONE (OUTPATIENT)
Dept: NEUROLOGY | Facility: CLINIC | Age: 43
End: 2023-12-07

## 2023-12-08 ENCOUNTER — TELEPHONE (OUTPATIENT)
Dept: NEUROLOGY | Facility: CLINIC | Age: 43
End: 2023-12-08

## 2023-12-08 NOTE — TELEPHONE ENCOUNTER
ADD ON ASAP  NP 12/12 @ 12:00 with Dr. Wilner Ace in [facility].  eval for [facility]

## 2023-12-12 ENCOUNTER — CONSULT (OUTPATIENT)
Dept: NEUROLOGY | Facility: CLINIC | Age: 43
End: 2023-12-12
Payer: COMMERCIAL

## 2023-12-12 VITALS
WEIGHT: 125 LBS | DIASTOLIC BLOOD PRESSURE: 60 MMHG | SYSTOLIC BLOOD PRESSURE: 90 MMHG | HEART RATE: 63 BPM | BODY MASS INDEX: 21.34 KG/M2 | HEIGHT: 64 IN

## 2023-12-12 DIAGNOSIS — R68.89 SPELLS OF DECREASED ATTENTIVENESS: ICD-10-CM

## 2023-12-12 DIAGNOSIS — R29.898 WEAKNESS OF LEFT LEG: ICD-10-CM

## 2023-12-12 DIAGNOSIS — G43.109 MIGRAINE WITH AURA AND WITHOUT STATUS MIGRAINOSUS, NOT INTRACTABLE: Primary | ICD-10-CM

## 2023-12-12 PROCEDURE — 99245 OFF/OP CONSLTJ NEW/EST HI 55: CPT | Performed by: PSYCHIATRY & NEUROLOGY

## 2023-12-12 NOTE — PATIENT INSTRUCTIONS
Lifestyle adjustments. Irrespective of treatment modalities applied, trigger control and lifestyle modification are indispensable to the successful management of migraine. This is especially important in children, adolescents, or pregnant women where drug treatments must be especially limited. Although there is no robust evidence for most of the recommendations, most are general health measures that, given the lack of adverse effects and the benefit for general well-being, we consider should be recommended in all patients. Avoid triggers. Many patients attribute the onset or worsening of pain to specific triggers such as stress, sleep changes, food, or atmospheric changes, among others. It is even possible that the relationship occurs inversely, so that premonitory symptoms such as sleep disturbances and appetite 48 to 72 hours before the onset of pain can be misinterpreted by the patient as the trigger of migraine attacks. The therapeutic implications of this relationship are also unclear. There are possible triggers such as sleep deprivation, fasting, or certain foods that can be easily avoidable. But avoiding other triggers can lead to very restrictive lifestyles with a reduction in quality of life that does not outweigh the potential beneficial.    Sleep. Another complex relationship is headache and sleep. An excess or lack of sleep can trigger migraine attacks and at the same time rest is one of the most used treatments to improve the symptoms of the migraine attack. Additionally, migraine and other headaches occur comorbidly with sleep disorders.  Patients with chronic migraine have a higher prevalence of sleep disorders, specifically poor sleep habits and nonrestorative rest.    Regarding general sleep measures for patients with headache, it is recommended to define regular sleep schedules that allow 8 hours of rest per day, insisting that they remain constant also during the weekend; have dinner 4 hours before bedtime and avoid liquids in the last two hours; and eliminate naps and avoid using screens, television, reading, or listening to music in bed. A nonpharmacological intervention to improve sleep habits can improve headache frequency and even reverse chronic to episodic migraine. Diet. In the scientific literature, but especially in the informative websites and magazines, multiple and varied diets are proposed that aim to reduce the frequency of headaches. There are two main approaches: elimination diets, which consist of suppressing potentially triggering foods such as chocolate, alcohol, cheese, nuts, or citrus fruits and diets that provide high or low amounts of certain components, ie, rich in vitamin B12, B6, or D or low in histamine, lactose, or fatty acids. The studies are not very rigorous and most do not have a control group (). In addition, it must be considered that food triggers were only associated with onset of headache in less than 10% of the participants. Dietary recommendations for patients with migraine should be the same as for the general population with special emphasis on the prevention of obesity, which is a factor related to headache chronification. It is recommendable to have a varied diet, eating five meals a day to avoid periods of prolonged fasting and incorporating water intake to reach around 2.5 liters per day, which should be increased in case of physical activity or increase in temperature or humidity. Specific diets should be recommended solely based on whether there are other comorbidities in the patient. Caffeine at moderate doses (< 400mg/day: equivalent to two cups of coffee) does not seem to have a negative effect on headache frequency although it should be taken regularly to avoid withdrawal headaches.     Although patients with migraine headaches and cluster headaches may be more susceptible to alcohol as a precipitant, there is no evidence to recommend abstinence from alcohol in all patients. Individual predisposition and cultural factors must be considered. Exercise. Aerobic exercise can prevent or reduce symptoms of multiple chronic diseases, including headache. With some methodological limitations, there are studies that demonstrate benefits of aerobic exercise as a therapeutic intervention to reduce the frequency and intensity of headaches, as well as the quality of life measured by questionnaires. Exercise can have a beneficial effect on headaches directly but also indirectly, improving sleep quality, mood, cardiovascular function, and preventing weight gain. In addition, it can improve the control of other diseases frequently comorbid with headache such as obesity, hypertension, anxiety, depression, or sleep disorders (). The clinical benefit of yoga as an add-on therapy in patients with episodic migraine has been demonstrated.        Vitamins for headache  Mg 400 mg daily for headache  RiboFlavin 400 mg daily for headache
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
## 2023-12-20 ENCOUNTER — PROCEDURE VISIT (OUTPATIENT)
Dept: NEUROLOGY | Facility: CLINIC | Age: 43
End: 2023-12-20
Payer: COMMERCIAL

## 2023-12-20 ENCOUNTER — TELEPHONE (OUTPATIENT)
Age: 43
End: 2023-12-20

## 2023-12-20 DIAGNOSIS — R29.898 LEFT ARM WEAKNESS: ICD-10-CM

## 2023-12-20 PROCEDURE — 95886 MUSC TEST DONE W/N TEST COMP: CPT | Performed by: PHYSICAL MEDICINE & REHABILITATION

## 2023-12-20 PROCEDURE — 95909 NRV CNDJ TST 5-6 STUDIES: CPT | Performed by: PHYSICAL MEDICINE & REHABILITATION

## 2023-12-20 NOTE — TELEPHONE ENCOUNTER
----- Message from Amy Little PA-C sent at 12/20/2023  1:22 PM EST -----  EMG showed C6-C7 radiculopathy which could be the cause of some arm tingling, numbness, and weakness. Lets see what the MRI and EEG from the neurologist see.

## 2023-12-21 ENCOUNTER — HOSPITAL ENCOUNTER (OUTPATIENT)
Dept: MRI IMAGING | Facility: CLINIC | Age: 43
Discharge: HOME | End: 2023-12-21
Payer: COMMERCIAL

## 2023-12-21 DIAGNOSIS — R29.898 WEAKNESS OF LEFT LEG: ICD-10-CM

## 2023-12-21 PROCEDURE — G1004 CDSM NDSC: HCPCS

## 2023-12-21 PROCEDURE — 70551 MRI BRAIN STEM W/O DYE: CPT

## 2023-12-22 ENCOUNTER — HOSPITAL ENCOUNTER (OUTPATIENT)
Dept: NEUROLOGY | Facility: HOSPITAL | Age: 43
End: 2023-12-22
Attending: PSYCHIATRY & NEUROLOGY
Payer: COMMERCIAL

## 2023-12-22 DIAGNOSIS — R68.89 SPELLS OF DECREASED ATTENTIVENESS: ICD-10-CM

## 2023-12-22 PROCEDURE — 95816 EEG AWAKE AND DROWSY: CPT | Performed by: STUDENT IN AN ORGANIZED HEALTH CARE EDUCATION/TRAINING PROGRAM

## 2023-12-22 PROCEDURE — 95816 EEG AWAKE AND DROWSY: CPT

## 2023-12-29 ENCOUNTER — TELEPHONE (OUTPATIENT)
Age: 43
End: 2023-12-29

## 2023-12-29 DIAGNOSIS — M54.12 CERVICAL RADICULOPATHY: Primary | ICD-10-CM

## 2023-12-29 NOTE — TELEPHONE ENCOUNTER
I just reviewed her EEG and MRI which were normal, I think it would be a good idea to increase her strength to start physical therapy.  I placed a referral.

## 2024-01-02 DIAGNOSIS — J30.1 SEASONAL ALLERGIC RHINITIS DUE TO POLLEN: ICD-10-CM

## 2024-01-02 RX ORDER — FLUTICASONE PROPIONATE 50 MCG
SPRAY, SUSPENSION (ML) NASAL
Qty: 16 ML | Refills: 0 | Status: SHIPPED | OUTPATIENT
Start: 2024-01-02

## 2024-01-23 ENCOUNTER — EVALUATION (OUTPATIENT)
Dept: PHYSICAL THERAPY | Facility: CLINIC | Age: 44
End: 2024-01-23
Payer: COMMERCIAL

## 2024-01-23 DIAGNOSIS — M54.12 CERVICAL RADICULOPATHY: Primary | ICD-10-CM

## 2024-01-23 PROCEDURE — 97161 PT EVAL LOW COMPLEX 20 MIN: CPT

## 2024-01-23 PROCEDURE — 97110 THERAPEUTIC EXERCISES: CPT

## 2024-01-23 PROCEDURE — 97112 NEUROMUSCULAR REEDUCATION: CPT

## 2024-01-23 NOTE — PROGRESS NOTES
PT Evaluation     Today's date: 2024  Patient name: Ben Rivas  : 1980  MRN: [id number]  Referring provider: Amy Little*  Dx:   Encounter Diagnosis     ICD-10-CM    1. Cervical radiculopathy  M54.12 Ambulatory Referral to Physical Therapy          Start Time: 1230  Stop Time: 1315  Total time in clinic (min): 45 minutes    Assessment  Assessment details: Pt is a 43 y.o. female presenting to physical therapy with chief complaint of radicular pain and weakness in LUE. Pt presents with decreased cervical ROM and decreased strength throughout LUE, as well as forward flexed posture. Pt's impairments are resulting in limitations with bending, lifting, twisting, and household activities. Will focus on improving upright posture and centralizing sx from LUE. Pt is a good candidate for skilled PT to address impairments and facilitate return to prior level of function.  Impairments: abnormal muscle tone, abnormal or restricted ROM, activity intolerance, impaired physical strength, lacks appropriate home exercise program, pain with function and poor posture   Functional limitations: bending, lifting, twisting, and household activities  Goals  STG 4 - weeks  1. Patient will demonstrate improved cervical extension by 5 degrees or more to facilitate functional ability.  2. Patient will demonstrate improved L shoulder ER strength to 3+/5 to facilitate functional ability.  LTG 8 - weeks  1. Patient will demonstrate decreased pain at worst with twisting activities by 2 levels or better to facilitate functional ability.  2. Patient will demonstrate increased FOTO score by 10 points or more from baseline.    Plan  Patient would benefit from: PT eval and skilled physical therapy  Planned modality interventions: cryotherapy, thermotherapy: hydrocollator packs and unattended electrical stimulation  Planned therapy interventions: abdominal trunk stabilization, ADL retraining, body mechanics training, home  exercise program, flexibility, joint mobilization, manual therapy, neuromuscular re-education, nerve gliding, massage, patient education, postural training, strengthening, stretching, therapeutic activities and therapeutic exercise  Frequency: 2x week  Duration in weeks: 8  Plan of Care beginning date: 2024  Plan of Care expiration date: 3/19/2024  Treatment plan discussed with: patient      Subjective Evaluation    History of Present Illness  Mechanism of injury: Pt reports she got a migraine in  with an aura and it resulted in numbness in her L hand that still persists in her third and fourth fingers. Pt having L-sided weakness across her whole L arm and leg. Awake routine EEG was normal, EMG showed C6-C7 radiculopathy. 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 Pt is still having pain across her L side, so she is having difficulty with lifting and bending, as well as with moving things across her body in a twisting motion due flank pain. Neuro mentioned potential whiplash from a prior fall, then did have a second fall in 2023. Pt now gets migraines about every other day with pain around the eye that wraps around to the back of her head. Pt reports the head pain typically comes when she is looking down at her phone or laptop. Pt gets her L arm feels heavier and weaker. Pain feels better at rest, gets more aggravated as the day goes.   Patient Goals  Patient goals for therapy: decreased pain and increased strength  Patient goal: Better posture, be able to do yoga/dancing  Pain  Current pain ratin  At best pain ratin  At worst pain rating: 10  Location: proximal LUE and LLE  Quality: dull ache  Relieving factors: rest  Aggravating factors: lifting, walking and sitting          Objective     Postural Observations  Seated posture: poor  Standing posture: poor    Additional Postural Observation Details  Forward head: lower cervical flexion and upper cervical  "extension    Palpation   Left   Tenderness of the suboccipitals.     Right   Tenderness of the suboccipitals.     Neurological Testing     Additional Neurological Details  Reported hyposensation in C6-C8 and reported hypersensation C2-C5    Active Range of Motion   Cervical/Thoracic Spine       Cervical    Flexion: 25 degrees  Restriction level: moderate  Extension: 40 degrees     Restriction level: moderate  Left lateral flexion: 11 degrees     Restriction level: maximal  Right lateral flexion: 9 degrees     Restriction level maximal  Left rotation: 42 degrees Restriction level: maximal  Right rotation: 47 degrees    Restriction level: moderate  Mechanical Assessment    Cervical    Seated retraction: repeated movements   Pain intensity: better    Thoracic      Lumbar      Strength/Myotome Testing   Cervical Spine     Left   Interossei strength (t1): 3    Right   Interossei strength (t1): 4-    Left Shoulder     Planes of Motion   Flexion: 3+   Abduction: 3   External rotation at 0°: 3   Internal rotation at 0°: 3+     Right Shoulder     Planes of Motion   Flexion: 4-   Abduction: 4-   External rotation at 0°: 4-   Internal rotation at 0°: 4     Left Wrist/Hand      (2nd hand position)     Trial 1: 30    Right Wrist/Hand      (2nd hand position)     Trial 1: 55    Tests     Right Shoulder   Positive ULTT1 and ULTT4.              Precautions: Hx of Diverticulitis, fibromyalgia     POC expires Unit limit Auth Expiration date PT/OT + Visit Limit?   3/19   BOMN                 Visit/Unit Tracking  AUTH Status:  Date                Used                Remaining                        Manuals 1/23 DO FOTO IE                                                               Neuro Re-Ed             Chin tucks 1x10 5\"            Corner pinches 1x10 5\"                                                                Ther Ex             Pt education about pathology, HEP, POC 12'                                                "                                                        Ther Activity                                       Gait Training                                       Modalities

## 2024-01-30 ENCOUNTER — OFFICE VISIT (OUTPATIENT)
Dept: PHYSICAL THERAPY | Facility: CLINIC | Age: 44
End: 2024-01-30
Payer: COMMERCIAL

## 2024-01-30 DIAGNOSIS — M54.12 CERVICAL RADICULOPATHY: Primary | ICD-10-CM

## 2024-01-30 PROCEDURE — 97140 MANUAL THERAPY 1/> REGIONS: CPT

## 2024-01-30 PROCEDURE — 97110 THERAPEUTIC EXERCISES: CPT

## 2024-01-30 PROCEDURE — 97112 NEUROMUSCULAR REEDUCATION: CPT

## 2024-01-30 NOTE — PROGRESS NOTES
"Daily Note     Today's date: 2024  Patient name: Ben Rivas  : 1980  MRN: [id number]  Referring provider: Amy Little*  Dx:   Encounter Diagnosis     ICD-10-CM    1. Cervical radiculopathy  M54.12           Start Time: 1500  Stop Time: 1545  Total time in clinic (min): 45 minutes    Subjective: Pt reports the chin tucks made her neck feel swollen after initial evaluation.      Objective: See treatment diary below      Assessment: Tolerated treatment fair. Patient demonstrated fatigue post treatment, exhibited good technique with therapeutic exercises, and would benefit from continued PT. Regressed chin tucks to performed in supine to decrease gravity loading with performance. Cueing for technique with this as well as differentiating from deep neck flexor activation. Pt appears adequately challenged by postural stability exercises today, progress gradually as tolerated.      Plan: Continue per plan of care.  Progress treatment as tolerated.       Precautions: Hx of Diverticulitis, fibromyalgia     POC expires Unit limit Auth Expiration date PT/OT + Visit Limit?   3/19   BOMN                 Visit/Unit Tracking  AUTH Status:  Date               24 visits Used  2              Remaining   22                     Manuals  FOTO           Cervical traction  5'           SOR  3'                                     Neuro Re-Ed             Chin tucks 1x10 5\" Supine 2x10 3\"           Corner pinches 1x10 5\" 2x10 5\"           Deep neck flexion  2x10 3\"           TB extensions  1x10 red                                     Ther Ex             Pt education about posture, posture roll 12' 7'           Thoracic ext c / roll  1x10 5\"                                                                            UBE retro for posture  5' L1           Ther Activity                                       Gait Training                                       Modalities                                   "

## 2024-01-31 DIAGNOSIS — J30.1 SEASONAL ALLERGIC RHINITIS DUE TO POLLEN: ICD-10-CM

## 2024-01-31 RX ORDER — FLUTICASONE PROPIONATE 50 MCG
SPRAY, SUSPENSION (ML) NASAL
Qty: 16 ML | Refills: 3 | Status: SHIPPED | OUTPATIENT
Start: 2024-01-31

## 2024-02-01 ENCOUNTER — OFFICE VISIT (OUTPATIENT)
Dept: PHYSICAL THERAPY | Facility: CLINIC | Age: 44
End: 2024-02-01
Payer: COMMERCIAL

## 2024-02-01 DIAGNOSIS — M54.12 CERVICAL RADICULOPATHY: Primary | ICD-10-CM

## 2024-02-01 PROCEDURE — 97140 MANUAL THERAPY 1/> REGIONS: CPT

## 2024-02-01 PROCEDURE — 97110 THERAPEUTIC EXERCISES: CPT

## 2024-02-01 NOTE — PROGRESS NOTES
"Daily Note     Today's date: 2024  Patient name: Ben Rivas  : 1980  MRN: [id number]  Referring provider: Amy Little*  Dx:   Encounter Diagnosis     ICD-10-CM    1. Cervical radiculopathy  M54.12                      Subjective: Patient states she is using the towel roll and it helps her posture. She reports N&T to 3rd and 4th digits.       Objective: See treatment diary below      Assessment: Patient was able to complete charted program with minimal increase in discomfort. Progressed reps as charted with no increased aggregation of sx. Added bilateral ER for postural strengthening, attempted TB but she had difficulty against resistance so performed with AROM. She is challenged with cervical muscular activation, cues to avoid chest muscles with good carryover. Patient demonstrated fatigue post treatment and would benefit from continued PT      Plan: Progress treatment as tolerated.       Precautions: Hx of Diverticulitis, fibromyalgia     POC expires Unit limit Auth Expiration date PT/OT + Visit Limit?   3/19   BOMN                 Visit/Unit Tracking  AUTH Status:  Date              24 visits Used  2 3             Remaining   22 21                    Manuals  FOTO           Cervical traction  5' 5'          SOR  3' 3'                                    Neuro Re-Ed             Chin tucks 1x10 5\" Supine 2x10 3\" Supine 2x10 3\"          Corner pinches 1x10 5\" 2x10 5\" 2x10   5\"          Deep neck flexion  2x10 3\" 2x10 3\"          TB extensions  1x10 red 1x15          No Moneys   YTB 1x15                       Ther Ex             Pt education about posture, posture roll 12' 7'           Thoracic ext c  roll  1x10 5\" 1x15   5\"                                                                           UBE retro for posture  5' L1 5' L1          Ther Activity                                       Gait Training                                       Modalities                "

## 2024-02-05 DIAGNOSIS — L29.0 PERIANAL ITCH: ICD-10-CM

## 2024-02-05 RX ORDER — CLOTRIMAZOLE AND BETAMETHASONE DIPROPIONATE 10; .64 MG/G; MG/G
CREAM TOPICAL 2 TIMES DAILY PRN
Qty: 30 G | Refills: 0 | Status: SHIPPED | OUTPATIENT
Start: 2024-02-05

## 2024-02-06 ENCOUNTER — OFFICE VISIT (OUTPATIENT)
Dept: PHYSICAL THERAPY | Facility: CLINIC | Age: 44
End: 2024-02-06
Payer: COMMERCIAL

## 2024-02-06 DIAGNOSIS — M54.12 CERVICAL RADICULOPATHY: Primary | ICD-10-CM

## 2024-02-06 PROCEDURE — 97140 MANUAL THERAPY 1/> REGIONS: CPT

## 2024-02-06 PROCEDURE — 97110 THERAPEUTIC EXERCISES: CPT

## 2024-02-06 NOTE — PROGRESS NOTES
"Daily Note     Today's date: 2024  Patient name: Ben Rivas  : 1980  MRN: [id number]  Referring provider: Amy Little*  Dx:   Encounter Diagnosis     ICD-10-CM    1. Cervical radiculopathy  M54.12                      Subjective: Pt reports she felt great after last session after the manual traction.       Objective: See treatment diary below      Assessment: Tolerated treatment well. Pt demonstrates good effort throughout session. Minimal curing given to facilitate proper exercise performance and technique. She reports no increase in pain during session. Added radial nerve glides this visit. She reports less sx in her L UE and neck after manual therapy. Patient demonstrated fatigue post treatment, exhibited good technique with therapeutic exercises, and would benefit from continued PT      Plan: Continue per plan of care.      Precautions: Hx of Diverticulitis, fibromyalgia     POC expires Unit limit Auth Expiration date PT/OT + Visit Limit?   3/19   BOMN                 Visit/Unit Tracking  AUTH Status:  Date   FOTO  FOTO          24 visits Used 1 2 3 4            Remaining   22 21 20             FOTO        Manuals  FOTO          Cervical traction  5' 5' 5'         SOR  3' 3' 3'                                   Neuro Re-Ed             Chin tucks 1x10 5\" Supine 2x10 3\" Supine 2x10 3\" Supine 2x10 3\"         Corner pinches 1x10 5\" 2x10 5\" 2x10   5\" 2x10 5\"         Deep neck flexion  2x10 3\" 2x10 3\" 2x10 3\"         TB extensions  1x10 red 1x15 1x15 red         No Moneys   YTB 1x15 Ytb 1x15         Radial nerve glides    2x10         Ther Ex             Pt education about posture, posture roll 12' 7'           Thoracic ext c / roll  1x10 5\" 1x15   5\" 1x15 5\"                                                                          UBE retro for posture  5' L1 5' L1 5' L1         Ther Activity                                       Gait Training           "                             Modalities

## 2024-02-08 ENCOUNTER — OFFICE VISIT (OUTPATIENT)
Dept: PHYSICAL THERAPY | Facility: CLINIC | Age: 44
End: 2024-02-08
Payer: COMMERCIAL

## 2024-02-08 DIAGNOSIS — M54.12 CERVICAL RADICULOPATHY: Primary | ICD-10-CM

## 2024-02-08 PROCEDURE — 97140 MANUAL THERAPY 1/> REGIONS: CPT

## 2024-02-08 PROCEDURE — 97110 THERAPEUTIC EXERCISES: CPT

## 2024-02-08 PROCEDURE — 97112 NEUROMUSCULAR REEDUCATION: CPT

## 2024-02-08 NOTE — PROGRESS NOTES
"Daily Note     Today's date: 2024  Patient name: Ben Rivas  : 1980  MRN: [id number]  Referring provider: Amy Little*  Dx:   Encounter Diagnosis     ICD-10-CM    1. Cervical radiculopathy  M54.12           Start Time: 1545  Stop Time: 1630  Total time in clinic (min): 45 minutes    Subjective: Pt is reporting less soreness after exercises now compared to the first session.       Objective: See treatment diary below      Assessment: Tolerated treatment well. Patient demonstrated fatigue post treatment, exhibited good technique with therapeutic exercises, and would benefit from continued PT. Cued for smaller lift-off in supine to better target deep neck flexors. Continued with radial nerve glides and added median nerve glides secondary to radicular pain through first 3-4 digits. Pt tolerated these exercises well.       Plan: Continue per plan of care.  Progress treatment as tolerated.       Precautions: Hx of Diverticulitis, fibromyalgia     POC expires Unit limit Auth Expiration date PT/OT + Visit Limit?   3/19   BOMN                 Visit/Unit Tracking  AUTH Status:  Date   FOTO  FOTO         24 visits Used 1 2 3 4 5           Remaining  23 22 21 20 19            FOTO        Manuals  FOTO         Cervical traction  5' 5' 5' 5'        SOR  3' 3' 3' 3'                                  Neuro Re-Ed             Chin tucks 1x10 5\" Supine 2x10 3\" Supine 2x10 3\" Supine 2x10 3\" Supine 2x10 3\"        Corner pinches 1x10 5\" 2x10 5\" 2x10   5\" 2x10 5\" 2x10 5\" prone        Deep neck flexion  2x10 3\" 2x10 3\" 2x10 3\" 1x10 3\"        TB extensions  1x10 red 1x15 1x15 red 1x15 red        No Moneys   YTB 1x15 Ytb 1x15 Ytb 1x15                                  Ther Ex             Pt education about posture, posture roll 12' 7'   3'        Thoracic ext c / roll  1x10 5\" 1x15   5\" 1x15 5\" 1x15 5\"        Radial nerve glides    2x10 2x10        Median nerve glides     " 2x10                                               UBE retro for posture  5' L1 5' L1 5' L1 5' L2        Ther Activity                                       Gait Training                                       Modalities

## 2024-02-13 ENCOUNTER — APPOINTMENT (OUTPATIENT)
Dept: PHYSICAL THERAPY | Facility: CLINIC | Age: 44
End: 2024-02-13
Payer: COMMERCIAL

## 2024-02-14 ENCOUNTER — OFFICE VISIT (OUTPATIENT)
Dept: PHYSICAL THERAPY | Facility: CLINIC | Age: 44
End: 2024-02-14
Payer: COMMERCIAL

## 2024-02-14 DIAGNOSIS — M54.12 CERVICAL RADICULOPATHY: Primary | ICD-10-CM

## 2024-02-14 PROCEDURE — 97140 MANUAL THERAPY 1/> REGIONS: CPT

## 2024-02-14 PROCEDURE — 97112 NEUROMUSCULAR REEDUCATION: CPT

## 2024-02-14 PROCEDURE — 97110 THERAPEUTIC EXERCISES: CPT

## 2024-02-14 NOTE — PROGRESS NOTES
"Daily Note     Today's date: 2024  Patient name: Ben Rivas  : 1980  MRN: [id number]  Referring provider: Amy Little*  Dx:   Encounter Diagnosis     ICD-10-CM    1. Cervical radiculopathy  M54.12           Start Time: 1415  Stop Time: 1500  Total time in clinic (min): 45 minutes    Subjective: Pt reports she sits with the towel behind her back for posture all the time now, feels more comfortable with it.      Objective: See treatment diary below      Assessment: Tolerated treatment well. Patient demonstrated fatigue post treatment, exhibited good technique with therapeutic exercises, and would benefit from continued PT. Progressed to performing chin tucks seated, pt tolerated this well. Pt challenged by deep neck flexion exercise, continue to improve strength of these muscles as tolerated.      Plan: Continue per plan of care.  Progress treatment as tolerated.       Precautions: Hx of Diverticulitis, fibromyalgia     POC expires Unit limit Auth Expiration date PT/OT + Visit Limit?   3/19   BOMN                 Visit/Unit Tracking  AUTH Status:  Date   FOTO          24 visits Used 1 2 3 4 5 6          Remaining  23 22 21 20 19 18           FOTO        Manuals  FOTO  DO FOTO      Cervical traction  5' 5' 5' 5' 5'       SOR  3' 3' 3' 3' 3'                                 Neuro Re-Ed             Chin tucks 1x10 5\" Supine 2x10 3\" Supine 2x10 3\" Supine 2x10 3\" Supine 2x10 3\" Seated 1x15 5\"       Corner pinches 1x10 5\" 2x10 5\" 2x10   5\" 2x10 5\" 2x10 5\" prone 2x10 5\" prone       Deep neck flexion  2x10 3\" 2x10 3\" 2x10 3\" 1x10 3\" 1x15 3\"       TB extensions  1x10 red 1x15 1x15 red 1x15 red 1x15 red       No Moneys   YTB 1x15 Ytb 1x15 Ytb 1x15 1x15 ylw                                 Ther Ex             Pt education about posture, posture roll 12' 7'   3'        Thoracic ext c  roll  1x10 5\" 1x15   5\" 1x15 5\" 1x15 5\" 1x15 5\"       Radial nerve " glides    2x10 2x10 2x10       Median nerve glides     2x10 2x10                                              UBE retro for posture  5' L1 5' L1 5' L1 5' L2 5' L2       Ther Activity                                       Gait Training                                       Modalities

## 2024-02-15 ENCOUNTER — OFFICE VISIT (OUTPATIENT)
Dept: PHYSICAL THERAPY | Facility: CLINIC | Age: 44
End: 2024-02-15
Payer: COMMERCIAL

## 2024-02-15 DIAGNOSIS — M54.12 CERVICAL RADICULOPATHY: Primary | ICD-10-CM

## 2024-02-15 PROCEDURE — 97110 THERAPEUTIC EXERCISES: CPT

## 2024-02-15 PROCEDURE — 97140 MANUAL THERAPY 1/> REGIONS: CPT

## 2024-02-15 PROCEDURE — 97112 NEUROMUSCULAR REEDUCATION: CPT

## 2024-02-15 NOTE — PROGRESS NOTES
"Daily Note     Today's date: 2/15/2024  Patient name: Ben Rivas  : 1980  MRN: [id number]  Referring provider: Amy Little*  Dx:   Encounter Diagnosis     ICD-10-CM    1. Cervical radiculopathy  M54.12           Start Time: 1545  Stop Time: 1630  Total time in clinic (min): 45 minutes    Subjective: Pt reports her migraines have been better. Her chief limitations are still lifting/carrying things.      Objective: See treatment diary below      Assessment: Tolerated treatment well. Patient demonstrated fatigue post treatment, exhibited good technique with therapeutic exercises, and would benefit from continued PT. Progressing overhead strengthening and movement, pt felt some L triceps discomfort after some repetitions. Performed triceps stretching to address this, pt reports some relief. No discomfort at end of session, only fatigue noted.       Plan: Continue per plan of care.      Precautions: Hx of Diverticulitis, fibromyalgia     POC expires Unit limit Auth Expiration date PT/OT + Visit Limit?   3/19   BOMN                 Visit/Unit Tracking  AUTH Status:  Date   FOTO 2/1 2/6 2/8 2/14 2/15        24 visits Used 1 2 3 4 5 6 7         Remaining  23 22 21 20 19 18 17          FOTO    FOTO done 2/15      Manuals  FOTO 2/1 2/6 2/8 2/14 2/15      Cervical traction  5' 5' 5' 5' 5' 3'      SOR  3' 3' 3' 3' 3' 3'      L UT stretch       3x20\"      L 1st rib mob       2'      Neuro Re-Ed             Chin tucks 1x10 5\" Supine 2x10 3\" Supine 2x10 3\" Supine 2x10 3\" Supine 2x10 3\" Seated 1x15 5\" Seated 1x15 5\"      Chin tuck c ext       1x10      Corner pinches 1x10 5\" 2x10 5\" 2x10   5\" 2x10 5\" 2x10 5\" prone 2x10 5\" prone       Deep neck flexion  2x10 3\" 2x10 3\" 2x10 3\" 1x10 3\" 1x15 3\"       TB extensions  1x10 red 1x15 1x15 red 1x15 red 1x15 red       No Moneys   YTB 1x15 Ytb 1x15 Ytb 1x15 1x15 ylw       Shoulder shrugs       1x10                   Ther Ex             Pt education " "about posture, HEP 12' 7'   3'  3'      Thoracic ext c 1/2 roll  1x10 5\" 1x15   5\" 1x15 5\" 1x15 5\" 1x15 5\" 1x15 5\"      Radial nerve glides    2x10 2x10 2x10 1x10      Median nerve glides     2x10 2x10 1x10      OH triceps stretch       5x10\"                                UBE retro for posture  5' L1 5' L1 5' L1 5' L2 5' L2 5' L1      Ther Activity             Standing Y raises       1x9                   Gait Training                                       Modalities                                                      "

## 2024-02-20 ENCOUNTER — OFFICE VISIT (OUTPATIENT)
Dept: PHYSICAL THERAPY | Facility: CLINIC | Age: 44
End: 2024-02-20
Payer: COMMERCIAL

## 2024-02-20 DIAGNOSIS — M54.12 CERVICAL RADICULOPATHY: Primary | ICD-10-CM

## 2024-02-20 PROCEDURE — 97112 NEUROMUSCULAR REEDUCATION: CPT

## 2024-02-20 PROCEDURE — 97140 MANUAL THERAPY 1/> REGIONS: CPT

## 2024-02-20 PROCEDURE — 97110 THERAPEUTIC EXERCISES: CPT

## 2024-02-20 NOTE — PROGRESS NOTES
"Daily Note     Today's date: 2024  Patient name: Ben Rivas  : 1980  MRN: [id number]  Referring provider: Amy Little*  Dx:   Encounter Diagnosis     ICD-10-CM    1. Cervical radiculopathy  M54.12           Start Time: 1545  Stop Time: 1630  Total time in clinic (min): 45 minutes    Subjective: Pt reports feeling good today and doing some of her exercises.      Objective: See treatment diary below      Assessment: Tolerated treatment well. Patient demonstrated fatigue post treatment, exhibited good technique with therapeutic exercises, and would benefit from continued PT. Pt demonstrates increased fatigue during today's session and reports that when she gets fatigued she gets tics. Withheld some exercises today due to reported fatigue and performed more things seated due to pt's report of feeling off balance when she has the tics. Pt able to tolerate modified session well.      Plan: Continue per plan of care.  Progress treatment as tolerated.       Precautions: Hx of Diverticulitis, fibromyalgia     POC expires Unit limit Auth Expiration date PT/OT + Visit Limit?   3/19   BOMN                 Visit/Unit Tracking  AUTH Status:  Date   FOTO 2/1 2/6 2/8 2/14 2/15 2/20       24 visits Used 1 2 3 4 5 6 7 8        Remaining  23 22 21 20 19 18 17 16         FOTO    FOTO done 2/15      Manuals  FOTO 2/1 2/6 2/8 2/14 2/15 2/20     Cervical traction  5' 5' 5' 5' 5' 3'      SOR  3' 3' 3' 3' 3' 3'      L UT stretch       3x20\" 3x20\"     L LS stretch        5x10\"     L 1st rib mob       2' 2'     Neuro Re-Ed             Chin tucks 1x10 5\" Supine 2x10 3\" Supine 2x10 3\" Supine 2x10 3\" Supine 2x10 3\" Seated 1x15 5\" Seated 1x15 5\" Seated 1x10 5\"     Chin tuck c ext       1x10 1x10     Corner pinches 1x10 5\" 2x10 5\" 2x10   5\" 2x10 5\" 2x10 5\" prone 2x10 5\" prone       Deep neck flexion  2x10 3\" 2x10 3\" 2x10 3\" 1x10 3\" 1x15 3\"       TB extensions  1x10 red 1x15 1x15 red 1x15 red 1x15 red " "      No Moneys   YTB 1x15 Ytb 1x15 Ytb 1x15 1x15 ylw       Shoulder shrugs       1x10 1x10 seated                  Ther Ex             Pt education  12' 7'   3'  3' 2'     Thoracic ext c 1/2 roll  1x10 5\" 1x15   5\" 1x15 5\" 1x15 5\" 1x15 5\" 1x15 5\" X20 5\"     Radial nerve glides    2x10 2x10 2x10 1x10      Median nerve glides     2x10 2x10 1x10 x20     OH triceps stretch       5x10\"      Cervical Rotation SNAGs c strap        1x10 5\"                  UBE retro for posture  5' L1 5' L1 5' L1 5' L2 5' L2 5' L1 5' L2     Ther Activity             Standing Y raises       1x9 1x10                  Gait Training                                       Modalities                                                        "

## 2024-02-21 PROBLEM — Z01.419 ENCOUNTER FOR ANNUAL ROUTINE GYNECOLOGICAL EXAMINATION: Status: RESOLVED | Noted: 2022-02-01 | Resolved: 2024-02-21

## 2024-02-22 ENCOUNTER — OFFICE VISIT (OUTPATIENT)
Dept: PHYSICAL THERAPY | Facility: CLINIC | Age: 44
End: 2024-02-22
Payer: COMMERCIAL

## 2024-02-22 DIAGNOSIS — M54.12 CERVICAL RADICULOPATHY: Primary | ICD-10-CM

## 2024-02-22 PROCEDURE — 97110 THERAPEUTIC EXERCISES: CPT

## 2024-02-22 PROCEDURE — 97112 NEUROMUSCULAR REEDUCATION: CPT

## 2024-02-22 NOTE — PROGRESS NOTES
"Daily Note     Today's date: 2024  Patient name: Ben Rivas  : 1980  MRN: [id number]  Referring provider: Amy Little*  Dx:   Encounter Diagnosis     ICD-10-CM    1. Cervical radiculopathy  M54.12                      Subjective: Patient states she is doing good, just has a few knots in L side of her neck near clavicle/scalene.       Objective: See treatment diary below      Assessment: Patient tolerated treatment well. Improved ROM following cervical rot with strap. Added scalene stretch which patient noted really feeling also into her face. No discomfort reported with rest of charted program. Patient demonstrated fatigue post treatment and would benefit from continued PT      Plan: Progress treatment as tolerated.       Precautions: Hx of Diverticulitis, fibromyalgia     POC expires Unit limit Auth Expiration date PT/OT + Visit Limit?   3/19   BOMN                 Visit/Unit Tracking  AUTH Status:  Date   FOTO /1 2/6 2/8 2/14 2/15 2/20       24 visits Used 1 2 3 4 5 6 7 8        Remaining  23 22 21 20 19 18 17 16         FOTO    FOTO done 2/15      Manuals  FOTO 2/1 2/6 2/8 2/14 2/15 2/20 2/22    Cervical traction  5' 5' 5' 5' 5' 3'      SOR  3' 3' 3' 3' 3' 3'      L UT stretch       3x20\" 3x20\" 3x20\"    L LS stretch        5x10\" 5x10\"    L 1st rib mob       2' 2'     Neuro Re-Ed             Chin tucks 1x10 5\" Supine 2x10 3\" Supine 2x10 3\" Supine 2x10 3\" Supine 2x10 3\" Seated 1x15 5\" Seated 1x15 5\" Seated 1x10 5\" Seated 1x10   5\"    Chin tuck c ext       1x10 1x10     Corner pinches 1x10 5\" 2x10 5\" 2x10   5\" 2x10 5\" 2x10 5\" prone 2x10 5\" prone   2x10 5\"    Deep neck flexion  2x10 3\" 2x10 3\" 2x10 3\" 1x10 3\" 1x15 3\"       TB extensions  1x10 red 1x15 1x15 red 1x15 red 1x15 red       No Moneys   YTB 1x15 Ytb 1x15 Ytb 1x15 1x15 ylw       Shoulder shrugs       1x10 1x10 seated 1x10 seated                 Ther Ex             Pt education  12' 7'   3'  3' 2'     Thoracic " "ext c 1/2 roll  1x10 5\" 1x15   5\" 1x15 5\" 1x15 5\" 1x15 5\" 1x15 5\" X20 5\" X20 5\"     Radial nerve glides    2x10 2x10 2x10 1x10      Median nerve glides     2x10 2x10 1x10 x20     OH triceps stretch       5x10\"      Cervical Rotation SNAGs c strap        1x10 5\" 1x10 5\"    Scalene stretch         5\"x5 to R    UBE retro for posture  5' L1 5' L1 5' L1 5' L2 5' L2 5' L1 5' L2 5' L2    Ther Activity             Standing Y raises       1x9 1x10 1x10                 Gait Training                                       Modalities                                                          "

## 2024-02-27 ENCOUNTER — OFFICE VISIT (OUTPATIENT)
Dept: PHYSICAL THERAPY | Facility: CLINIC | Age: 44
End: 2024-02-27
Payer: COMMERCIAL

## 2024-02-27 DIAGNOSIS — M54.12 CERVICAL RADICULOPATHY: Primary | ICD-10-CM

## 2024-02-27 PROCEDURE — 97110 THERAPEUTIC EXERCISES: CPT

## 2024-02-27 PROCEDURE — 97140 MANUAL THERAPY 1/> REGIONS: CPT

## 2024-02-27 PROCEDURE — 97112 NEUROMUSCULAR REEDUCATION: CPT

## 2024-02-27 NOTE — PROGRESS NOTES
"Daily Note     Today's date: 2024  Patient name: Ben Rivas  : 1980  MRN: [id number]  Referring provider: Amy Little*  Dx:   Encounter Diagnosis     ICD-10-CM    1. Cervical radiculopathy  M54.12                      Subjective: Patient states she has been using a heating pad and has also gotten a new sacral  and cervical pillow. She is noting less pain overall.      Objective: See treatment diary below      Assessment: Patient with improved tolerance to cervical rotation. Focus on postural strengthening and stretches to tolerance. Re-initiated postural resistance exercises with no c/o pain or discomfort. Sets kept small due to precaution. Patient demonstrated fatigue post treatment and would benefit from continued PT      Plan: Progress treatment as tolerated.       Precautions: Hx of Diverticulitis, fibromyalgia     POC expires Unit limit Auth Expiration date PT/OT + Visit Limit?   3/19   BOMN                 Visit/Unit Tracking  AUTH Status:  Date   FOTO 2/1 2/6 2/8 2/14 2/15 2/20 2/27      24 visits Used 1 2 3 4 5 6 7 8 9       Remaining  23 22 21 20 19 18 17 16 15        FOTO    FOTO done 2/15      Manuals  FOTO 2/1 2/6 2/8 2/14 2/15 2/20 2/22 2/27   Cervical traction  5' 5' 5' 5' 5' 3'      SOR  3' 3' 3' 3' 3' 3'      L UT stretch       3x20\" 3x20\" 3x20\" 3x20\"    L LS stretch        5x10\" 5x10\"    L 1st rib mob       2' 2'     Neuro Re-Ed             Chin tucks 1x10 5\" Supine 2x10 3\" Supine 2x10 3\" Supine 2x10 3\" Supine 2x10 3\" Seated 1x15 5\" Seated 1x15 5\" Seated 1x10 5\" Seated 1x10   5\" Seated 1x10 5\"   Chin tuck c ext       1x10 1x10     Corner pinches 1x10 5\" 2x10 5\" 2x10   5\" 2x10 5\" 2x10 5\" prone 2x10 5\" prone   2x10 5\" 2x10 5\"   Deep neck flexion  2x10 3\" 2x10 3\" 2x10 3\" 1x10 3\" 1x15 3\"       TB extensions  1x10 red 1x15 1x15 red 1x15 red 1x15 red    +rows   YTB 1x12  2\"   No Moneys   YTB 1x15 Ytb 1x15 Ytb 1x15 1x15 ylw       Shoulder shrugs       1x10 " "1x10 seated 1x10 seated 1x10 seated   TB Rows             Ther Ex             Pt education  12' 7'   3'  3' 2'     Thoracic ext c 1/2 roll  1x10 5\" 1x15   5\" 1x15 5\" 1x15 5\" 1x15 5\" 1x15 5\" X20 5\" X20 5\"  X20 5\"   Radial nerve glides    2x10 2x10 2x10 1x10      Median nerve glides     2x10 2x10 1x10 x20     OH triceps stretch       5x10\"      Cervical Rotation SNAGs c strap        1x10 5\" 1x10 5\" 1x10 5\"   Scalene stretch         5\"x5 to R 5\"x5 to R    UBE retro for posture  5' L1 5' L1 5' L1 5' L2 5' L2 5' L1 5' L2 5' L2 5' L2   Ther Activity             Standing Y raises       1x9 1x10 1x10 1x15                Gait Training                                       Modalities                                                            "

## 2024-02-29 ENCOUNTER — OFFICE VISIT (OUTPATIENT)
Dept: PHYSICAL THERAPY | Facility: CLINIC | Age: 44
End: 2024-02-29
Payer: COMMERCIAL

## 2024-02-29 DIAGNOSIS — M54.12 CERVICAL RADICULOPATHY: Primary | ICD-10-CM

## 2024-02-29 PROCEDURE — 97140 MANUAL THERAPY 1/> REGIONS: CPT

## 2024-02-29 PROCEDURE — 97112 NEUROMUSCULAR REEDUCATION: CPT

## 2024-02-29 PROCEDURE — 97110 THERAPEUTIC EXERCISES: CPT

## 2024-02-29 NOTE — PROGRESS NOTES
"Daily Note     Today's date: 2024  Patient name: Ben Rivas  : 1980  MRN: [id number]  Referring provider: Amy Little*  Dx:   Encounter Diagnosis     ICD-10-CM    1. Cervical radiculopathy  M54.12                      Subjective: Patient states her neck is doing good. She notes she is getting her L sided discomfort in her trunk like what she was here for last time.       Objective: See treatment diary below      Assessment: Tolerated treatment well. Patient was able to increase reps as charted with no increased pain. Less discomfort with cervical rotation as well as improved mobility with SNAGs. Patient demonstrated fatigue post treatment and would benefit from continued PT      Plan: Progress treatment as tolerated.       Precautions: Hx of Diverticulitis, fibromyalgia     POC expires Unit limit Auth Expiration date PT/OT + Visit Limit?   3/19   BOMN                 Visit/Unit Tracking  AUTH Status:  Date   FOTO 2/1 2/6 2/8 2/14 2/15 2/20 2/27 2/29     24 visits Used 1 2 3 4 5 6 7 8 9 10      Remaining  23 22 21 20 19 18 17 16 15 14       FOTO    FOTO done 2/15      Manuals 2/29  2/1 2/6 2/8 2/14 2/15 2/20 2/22 2/27   Cervical traction   5' 5' 5' 5' 3'      SOR 2'  3' 3' 3' 3' 3'      L UT stretch 3x20\"      3x20\" 3x20\" 3x20\" 3x20\"    L LS stretch        5x10\" 5x10\"    L 1st rib mob       2' 2'     Neuro Re-Ed             Chin tucks Seated 1x10 5\"  Supine 2x10 3\" Supine 2x10 3\" Supine 2x10 3\" Seated 1x15 5\" Seated 1x15 5\" Seated 1x10 5\" Seated 1x10   5\" Seated 1x10 5\"   Chin tuck c ext       1x10 1x10     Corner pinches HEP  2x10   5\" 2x10 5\" 2x10 5\" prone 2x10 5\" prone   2x10 5\" 2x10 5\"   Deep neck flexion   2x10 3\" 2x10 3\" 1x10 3\" 1x15 3\"       TB extensions +rows YTB 1x20 2\"   1x15 1x15 red 1x15 red 1x15 red    +rows   YTB 1x12  2\"   No Moneys   YTB 1x15 Ytb 1x15 Ytb 1x15 1x15 ylw       Shoulder shrugs 1x10 seated      1x10 1x10 seated 1x10 seated 1x10 seated              " "  Ther Ex             Pt education      3'  3' 2'     Thoracic ext c 1/2 roll X20 5\"  1x15   5\" 1x15 5\" 1x15 5\" 1x15 5\" 1x15 5\" X20 5\" X20 5\"  X20 5\"   Radial nerve glides    2x10 2x10 2x10 1x10      Median nerve glides     2x10 2x10 1x10 x20     OH triceps stretch       5x10\"      Cervical Rotation SNAGs c strap 1x10 5\"        1x10 5\" 1x10 5\" 1x10 5\"   Scalene stretch 10\"x5        5\"x5 to R 5\"x5 to R    UBE retro for posture 5' L2   5' L1 5' L1 5' L2 5' L2 5' L1 5' L2 5' L2 5' L2   Ther Activity             Standing Y raises       1x9 1x10 1x10 1x15                Gait Training                                       Modalities                                                              "

## 2024-03-05 ENCOUNTER — OFFICE VISIT (OUTPATIENT)
Dept: PHYSICAL THERAPY | Facility: CLINIC | Age: 44
End: 2024-03-05
Payer: COMMERCIAL

## 2024-03-05 DIAGNOSIS — M54.12 CERVICAL RADICULOPATHY: Primary | ICD-10-CM

## 2024-03-05 PROCEDURE — 97140 MANUAL THERAPY 1/> REGIONS: CPT

## 2024-03-05 PROCEDURE — 97112 NEUROMUSCULAR REEDUCATION: CPT

## 2024-03-05 PROCEDURE — 97110 THERAPEUTIC EXERCISES: CPT

## 2024-03-05 NOTE — PROGRESS NOTES
"Daily Note     Today's date: 3/5/2024  Patient name: Ben Rivas  : 1980  MRN: [id number]  Referring provider: Amy Little*  Dx:   Encounter Diagnosis     ICD-10-CM    1. Cervical radiculopathy  M54.12                      Subjective: Pt reports she can feel the progress in her neck.       Objective: See treatment diary below      Assessment: Tolerated treatment well. Pt demonstrates good effort throughout session. Minimal curing given to facilitate proper exercise performance and technique. She reports no aggravation in sx with exercises today. Increase TB resistance for rows and extensions NV. Patient demonstrated fatigue post treatment, exhibited good technique with therapeutic exercises, and would benefit from continued PT      Plan: Continue per plan of care.      Precautions: Hx of Diverticulitis, fibromyalgia     POC expires Unit limit Auth Expiration date PT/OT + Visit Limit?   3/19   BOMN                 Visit/Unit Tracking  AUTH Status:  Date   FOTO 2/1 2/6 2/8 2/14 2/15 2/20 2/27 2/29 3/5    24 visits Used 1 2 3 4 5 6 7 8 9 10 11     Remaining  23 22 21 20 19 18 17 16 15 14 13      FOTO    FOTO done 2/15      Manuals 2/29 3/5    2/14 2/15 2/20 2/22 2/27   Cervical traction      5' 3'      SOR 2' 2'    3' 3'      L UT stretch 3x20\" 3x20\"     3x20\" 3x20\" 3x20\" 3x20\"    L LS stretch        5x10\" 5x10\"    L 1st rib mob       2' 2'     Neuro Re-Ed             Chin tucks Seated 1x10 5\" Seated 1x10 5\"    Seated 1x15 5\" Seated 1x15 5\" Seated 1x10 5\" Seated 1x10   5\" Seated 1x10 5\"   Chin tuck c ext       1x10 1x10     Corner pinches HEP     2x10 5\" prone   2x10 5\" 2x10 5\"   Deep neck flexion      1x15 3\"       TB extensions +rows YTB 1x20 2\"  +rows YTB 1x20 2\"  Red nv   1x15 red    +rows   YTB 1x12  2\"   No Moneys      1x15 ylw       Shoulder shrugs 1x10 seated 1x10 seated     1x10 1x10 seated 1x10 seated 1x10 seated                Ther Ex             Pt education        3' 2'   " "  Thoracic ext c 1/2 roll X20 5\" X20 5\"    1x15 5\" 1x15 5\" X20 5\" X20 5\"  X20 5\"   Radial nerve glides      2x10 1x10      Median nerve glides      2x10 1x10 x20     OH triceps stretch       5x10\"      Cervical Rotation SNAGs c strap 1x10 5\"  1x10 5\"      1x10 5\" 1x10 5\" 1x10 5\"   Scalene stretch 10\"x5 20\"x3       5\"x5 to R 5\"x5 to R    UBE retro for posture 5' L2  5' L2    5' L2 5' L1 5' L2 5' L2 5' L2   Ther Activity             Standing Y raises  1x15     1x9 1x10 1x10 1x15                Gait Training                                       Modalities                                                                "

## 2024-03-07 ENCOUNTER — OFFICE VISIT (OUTPATIENT)
Dept: PHYSICAL THERAPY | Facility: CLINIC | Age: 44
End: 2024-03-07
Payer: COMMERCIAL

## 2024-03-07 DIAGNOSIS — M54.12 CERVICAL RADICULOPATHY: Primary | ICD-10-CM

## 2024-03-07 PROCEDURE — 97112 NEUROMUSCULAR REEDUCATION: CPT

## 2024-03-07 PROCEDURE — 97140 MANUAL THERAPY 1/> REGIONS: CPT

## 2024-03-07 PROCEDURE — 97110 THERAPEUTIC EXERCISES: CPT

## 2024-03-07 NOTE — PROGRESS NOTES
"Daily Note     Today's date: 3/7/2024  Patient name: Ben Rivas  : 1980  MRN: [id number]  Referring provider: Amy Little*  Dx:   Encounter Diagnosis     ICD-10-CM    1. Cervical radiculopathy  M54.12           Start Time: 1545  Stop Time: 1630  Total time in clinic (min): 45 minutes    Subjective: Pt report slowly improving overall. Pt does have some discomfort with lifting heavier objects.      Objective: See treatment diary below      Assessment: Tolerated treatment well. Patient demonstrated fatigue post treatment, exhibited good technique with therapeutic exercises, and would benefit from continued PT. Pt demonstrated fairly equal strength bilateral, about 3+ to 4-/5 for flexion and abduction, continue to facilitate better strength/endurance for shoulder/scapular muscles as pt is fatigued with doing cervical SNAGs. Pt did well with progressions today, notes feeling fatigue at end of session.       Plan: Continue per plan of care.  Progress treatment as tolerated.       Precautions: Hx of Diverticulitis, fibromyalgia     POC expires Unit limit Auth Expiration date PT/OT + Visit Limit?   3/19   BOMN                 Visit/Unit Tracking  AUTH Status:  Date 3/7              24 visits Used 13               Remaining  11                FOTO    FOTO done 2/15      Manuals 2/29 3/5 3/7   2/14 2/15 2/20 2/22 2/27   Cervical traction      5' 3'      SOR 2' 2' 3'   3' 3'      L UT stretch 3x20\" 3x20\" 5x15\"    3x20\" 3x20\" 3x20\" 3x20\"    L LS stretch        5x10\" 5x10\"    L 1st rib mob       2' 2'     Neuro Re-Ed             Chin tucks Seated 1x10 5\" Seated 1x10 5\" Seated 1x10 5\"   Seated 1x15 5\" Seated 1x15 5\" Seated 1x10 5\" Seated 1x10   5\" Seated 1x10 5\"   Chin tuck c ext   1x10    1x10 1x10     Corner pinches HEP     2x10 5\" prone   2x10 5\" 2x10 5\"   Deep neck flexion      1x15 3\"       TB rows/extensions +rows YTB 1x20 2\"  +rows YTB 1x20 2\"  X15 ea red   1x15 red    +rows   YTB 1x12  2\"   No " "Moneys      1x15 ylw       Shoulder shrugs 1x10 seated 1x10 seated NP    1x10 1x10 seated 1x10 seated 1x10 seated                Ther Ex             Pt education        3' 2'     Thoracic ext c 1/2 roll X20 5\" X20 5\" X20 5\"   1x15 5\" 1x15 5\" X20 5\" X20 5\"  X20 5\"   Radial nerve glides      2x10 1x10      Median nerve glides      2x10 1x10 x20     Cervical Rotation SNAGs c strap 1x10 5\"  1x10 5\" 1x10 5\" ea     1x10 5\" 1x10 5\" 1x10 5\"   Scalene stretch 10\"x5 20\"x3       5\"x5 to R 5\"x5 to R    UBE retro for posture 5' L2  5' L2 NP   5' L2 5' L1 5' L2 5' L2 5' L2   Ther Activity             Standing Y raises  1x15 1x15 1#   1x9 1x10 1x10 1x15   Standing front raises   1x10+5 1# 5\" holds 2#         Gait Training                                       Modalities                                                                  "

## 2024-03-12 ENCOUNTER — OFFICE VISIT (OUTPATIENT)
Dept: PHYSICAL THERAPY | Facility: CLINIC | Age: 44
End: 2024-03-12
Payer: COMMERCIAL

## 2024-03-12 DIAGNOSIS — M54.12 CERVICAL RADICULOPATHY: Primary | ICD-10-CM

## 2024-03-12 PROCEDURE — 97112 NEUROMUSCULAR REEDUCATION: CPT

## 2024-03-12 PROCEDURE — 97140 MANUAL THERAPY 1/> REGIONS: CPT

## 2024-03-12 PROCEDURE — 97110 THERAPEUTIC EXERCISES: CPT

## 2024-03-12 NOTE — PROGRESS NOTES
"Daily Note     Today's date: 3/12/2024  Patient name: Ben Riavs  : 1980  MRN: [id number]  Referring provider: Amy Little*  Dx:   Encounter Diagnosis     ICD-10-CM    1. Cervical radiculopathy  M54.12                      Subjective: Pt reports it feels like she has a cramp in her R UT area.       Objective: See treatment diary below      Assessment: Tolerated treatment well. Pt demonstrates good effort throughout session. Minimal curing given to facilitate proper exercise performance and technique. She tolerates addition of weights for Y raises today. She reports no increase in pain with exercises, slight decrease in cramping/tightness after manuals. Patient demonstrated fatigue post treatment, exhibited good technique with therapeutic exercises, and would benefit from continued PT      Plan: Continue per plan of care.      Precautions: Hx of Diverticulitis, fibromyalgia     POC expires Unit limit Auth Expiration date PT/OT + Visit Limit?   3/19   BOMN                 Visit/Unit Tracking  AUTH Status:  Date 3/7 3/12             24 visits Used 13 14              Remaining  11 10               FOTO    FOTO done 2/15      Manuals 2/29 3/5 3/7 3/12  2/14 2/15 2/20 2/22 2/27   Cervical traction      5' 3'      SOR 2' 2' 3' 4'  3' 3'      L UT stretch 3x20\" 3x20\" 5x15\"    3x20\" 3x20\" 3x20\" 3x20\"    L LS stretch        5x10\" 5x10\"    L 1st rib mob       2' 2'     Neuro Re-Ed             Chin tucks Seated 1x10 5\" Seated 1x10 5\" Seated 1x10 5\" Seated 1x10 5\"  Seated 1x15 5\" Seated 1x15 5\" Seated 1x10 5\" Seated 1x10   5\" Seated 1x10 5\"   Chin tuck c ext   1x10 1x10   1x10 1x10     Corner pinches HEP     2x10 5\" prone   2x10 5\" 2x10 5\"   Deep neck flexion      1x15 3\"       TB rows/extensions +rows YTB 1x20 2\"  +rows YTB 1x20 2\"  X15 ea red X15 ea red  1x15 red    +rows   YTB 1x12  2\"   No Moneys      1x15 ylw       Shoulder shrugs 1x10 seated 1x10 seated NP    1x10 1x10 seated 1x10 seated 1x10 " "seated                Ther Ex             Pt education        3' 2'     Thoracic ext c 1/2 roll X20 5\" X20 5\" X20 5\" X15 5\"  1x15 5\" 1x15 5\" X20 5\" X20 5\"  X20 5\"   Radial nerve glides      2x10 1x10      Median nerve glides      2x10 1x10 x20     Cervical Rotation SNAGs c strap 1x10 5\"  1x10 5\" 1x10 5\" ea 1x10 5\" ea    1x10 5\" 1x10 5\" 1x10 5\"   Scalene stretch 10\"x5 20\"x3  10\"x5 + UT     5\"x5 to R 5\"x5 to R    UBE retro for posture 5' L2  5' L2 NP 5'  5' L2 5' L1 5' L2 5' L2 5' L2   Ther Activity             Standing Y raises  1x15 1x15 1# 1x15   1x9 1x10 1x10 1x15   Standing front raises   1x10+5 1# 5\" holds NV 2#        Gait Training                                       Modalities                                                                    "

## 2024-03-14 ENCOUNTER — OFFICE VISIT (OUTPATIENT)
Dept: PHYSICAL THERAPY | Facility: CLINIC | Age: 44
End: 2024-03-14
Payer: COMMERCIAL

## 2024-03-14 DIAGNOSIS — M54.12 CERVICAL RADICULOPATHY: Primary | ICD-10-CM

## 2024-03-14 PROCEDURE — 97140 MANUAL THERAPY 1/> REGIONS: CPT

## 2024-03-14 PROCEDURE — 97112 NEUROMUSCULAR REEDUCATION: CPT

## 2024-03-14 PROCEDURE — 97110 THERAPEUTIC EXERCISES: CPT

## 2024-03-14 NOTE — PROGRESS NOTES
"Daily Note     Today's date: 3/14/2024  Patient name: Ben Rivas  : 1980  MRN: [id number]  Referring provider: Amy Little*  Dx:   Encounter Diagnosis     ICD-10-CM    1. Cervical radiculopathy  M54.12           Start Time: 1545  Stop Time: 1630  Total time in clinic (min): 45 minutes    Subjective: Pt reports she feels fatigued today and has a hard time talking when she gets fatigued.      Objective: See treatment diary below      Assessment: Tolerated treatment well. Patient demonstrated fatigue post treatment, exhibited good technique with therapeutic exercises, and would benefit from continued PT. Pt's fatigue sx improved after she was given a short break and water, more verbally responsive. Pt reports continued numbness in her L third and fourth digits, advised to increase frequency of chin tucks and to ensure full ROM during performance.       Plan: Continue per plan of care.  Progress treatment as tolerated.       Precautions: Hx of Diverticulitis, fibromyalgia     POC expires Unit limit Auth Expiration date PT/OT + Visit Limit?   3/19   BOMN                 Visit/Unit Tracking  AUTH Status:  Date 3/7 3/12 3/14            24 visits Used 13 14 15             Remaining  11 10 9              FOTO    FOTO done 2/15      Manuals 2/29 3/5 3/7 3/12 3/14    2/22 2/27   Chin tuck ext     MM        SOR 2' 2' 3' 4'         L UT stretch 3x20\" 3x20\" 5x15\"  5x15\"    3x20\" 3x20\"    L LS stretch     5x15\"    5x10\"    L 1st rib mob             Neuro Re-Ed             Chin tucks Seated 1x10 5\" Seated 1x10 5\" Seated 1x10 5\" Seated 1x10 5\" 1x10 5\"    Seated 1x10   5\" Seated 1x10 5\"   Chin tuck c ext   1x10 1x10 1x10        Deep neck flexion             TB rows/extensions +rows YTB 1x20 2\"  +rows YTB 1x20 2\"  X15 ea red X15 ea red X15 ea red     +rows   YTB 1x12  2\"   No Moneys             Shoulder shrugs 1x10 seated 1x10 seated NP      1x10 seated 1x10 seated                Ther Ex             Pt " "education              Thoracic ext c 1/2 roll; hands behind head X20 5\" X20 5\" X20 5\" X15 5\" X15 5\"    X20 5\"  X20 5\"   Radial nerve glides             Median nerve glides     x15        Cervical Rotation SNAGs c strap 1x10 5\"  1x10 5\" 1x10 5\" ea 1x10 5\" ea     1x10 5\" 1x10 5\"   Scalene stretch 10\"x5 20\"x3  10\"x5 + UT     5\"x5 to R 5\"x5 to R    UBE retro for posture 5' L2  5' L2 NP 5'     5' L2 5' L2   Ther Activity             Standing Y raises  1x15 1x15 1# 1x15 2x7 2#    1x10 1x15   Standing front raises   1x10+5 1# 5\" holds NV 1x15 2#        Gait Training                                       Modalities                                                                      "

## 2024-03-19 ENCOUNTER — EVALUATION (OUTPATIENT)
Dept: PHYSICAL THERAPY | Facility: CLINIC | Age: 44
End: 2024-03-19
Payer: COMMERCIAL

## 2024-03-19 DIAGNOSIS — M54.12 CERVICAL RADICULOPATHY: Primary | ICD-10-CM

## 2024-03-19 PROCEDURE — 97110 THERAPEUTIC EXERCISES: CPT

## 2024-03-19 PROCEDURE — 97112 NEUROMUSCULAR REEDUCATION: CPT

## 2024-03-19 NOTE — PROGRESS NOTES
PT Re-Evaluation     Today's date: 3/19/2024  Patient name: Ben Rivas  : 1980  MRN: [id number]  Referring provider: Amy Little*  Dx:   Encounter Diagnosis     ICD-10-CM    1. Cervical radiculopathy  M54.12           Start Time: 1545  Stop Time: 1631  Total time in clinic (min): 46 minutes    Assessment  Assessment details: Pt is a 43 y.o. female presenting to physical therapy with chief complaint of radicular pain and weakness in LUE. Upon re-evaluation, pt demonstrates significantly improved cervical ROM from initial assessment. Pt also demonstrates some improvement of LUE strength. Pt still has some residual weakness and radicular sx of LUE that is resulting in limitations with bending, lifting, twisting, and household activities. Continue to focus on restoring functional strength and movement patterns. Pt is a good candidate for continued skilled PT to address impairments and facilitate return to prior level of function.  Impairments: abnormal muscle tone, abnormal or restricted ROM, activity intolerance, impaired physical strength, pain with function and poor posture   Functional limitations: bending, lifting, twisting, and household activities  Goals  STG 4 - weeks  1. Patient will demonstrate improved cervical extension by 5 degrees or more to facilitate functional ability. -met  2. Patient will demonstrate improved L shoulder ER strength to 3+/5 to facilitate functional ability. -met  LTG 8 - weeks  1. Patient will demonstrate decreased pain at worst with twisting activities by 2 levels or better to facilitate functional ability. -met  2. Patient will demonstrate increased FOTO score by 10 points or more from baseline. -ongoing    Plan  Patient would benefit from: PT eval and skilled physical therapy  Planned modality interventions: cryotherapy, thermotherapy: hydrocollator packs and unattended electrical stimulation  Planned therapy interventions: abdominal trunk stabilization, ADL  retraining, body mechanics training, home exercise program, flexibility, joint mobilization, manual therapy, neuromuscular re-education, nerve gliding, massage, patient education, postural training, strengthening, stretching, therapeutic activities and therapeutic exercise  Frequency: 2x week  Duration in weeks: 8  Plan of Care beginning date: 3/19/2024  Plan of Care expiration date: 2024  Treatment plan discussed with: patient      Subjective Evaluation    History of Present Illness  Mechanism of injury: Pt reports she got a migraine in  with an aura and it resulted in numbness in her L hand that still persists in her third and fourth fingers. Pt having L-sided weakness across her whole L arm and leg. Awake routine EEG was normal, EMG showed C6-C7 radiculopathy. 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 Pt is still having pain across her L side, so she is having difficulty with lifting and bending, as well as with moving things across her body in a twisting motion due flank pain. Neuro mentioned potential whiplash from a prior fall, then did have a second fall in 2023. Upon re-evaluation, pt reports she is feeling progress and doing well with physical therapy. Pt reports her L arm is getting stronger but does feel more fatigue radiating down it. Pt still gets sharp pain occasionally in the back of the head and around the sides to the ears. Migraines are less frequent now, come on about once per week. Pt still reports some difficulty reaching up overhead and out to the side (worse on L). Also has difficulty moving heavy pot laterally. Pt reports feeling fatigued trying to lift her head back or out to the side.  Patient Goals  Patient goals for therapy: decreased pain and increased strength  Patient goal: Better posture, be able to do yoga/dancing  Pain  Current pain ratin  At best pain ratin  At worst pain rating: 10  Location: proximal LUE and LLE  Quality: dull  ache  Relieving factors: rest  Aggravating factors: lifting, walking and sitting          Objective     Postural Observations  Seated posture: poor  Standing posture: poor    Additional Postural Observation Details  Forward head: lower cervical flexion and upper cervical extension    Palpation   Left   Tenderness of the suboccipitals.     Right   Tenderness of the suboccipitals.     Neurological Testing     Additional Neurological Details  Reported hyposensation in C6-C8 and reported hypersensation C2-C5    Active Range of Motion   Cervical/Thoracic Spine       Cervical    Flexion: 25 degrees  Restriction level: moderate  Extension: 40 degrees     Restriction level: moderate  Left lateral flexion: 30 degrees     Restriction level: minimal  Right lateral flexion: 33 degrees     Restriction level minimal  Left rotation: 66 degrees Restriction level: minimal  Right rotation: 61 degrees    Restriction level: minimal  Mechanical Assessment    Cervical    Seated retraction: repeated movements   Pain intensity: better    Thoracic      Lumbar      Strength/Myotome Testing     Left Shoulder     Planes of Motion   Flexion: 4-   Abduction: 4-   External rotation at 0°: 4-   Internal rotation at 0°: 4-     Right Shoulder     Planes of Motion   Flexion: 4   Abduction: 4   External rotation at 0°: 4   Internal rotation at 0°: 4     Left Wrist/Hand      (2nd hand position)     Trial 1: 30    Right Wrist/Hand      (2nd hand position)     Trial 1: 55    Tests     Right Shoulder   Positive ULTT1 and ULTT4.              Precautions: Hx of Diverticulitis, fibromyalgia     POC expires Unit limit Auth Expiration date PT/OT + Visit Limit?   5/14   BOMN                 Visit/Unit Tracking  AUTH Status:  Date 3/7 3/12 3/14 3/19           24 visits Used 13 14 15 16            Remaining  11 10 9 8             FOTO    FOTO done 2/15      Manuals 2/29 3/5 3/7 3/12 3/14 3/19   2/22 2/27   Chin tuck ext     MM 3'       SOR 2' 2' 3' 4'      "    L UT stretch 3x20\" 3x20\" 5x15\"  5x15\"    3x20\" 3x20\"    L LS stretch     5x15\"    5x10\"    Cerv traction c retraction      2'       Neuro Re-Ed             Chin tucks Seated 1x10 5\" Seated 1x10 5\" Seated 1x10 5\" Seated 1x10 5\" 1x10 5\" 1x10 5\"   Seated 1x10   5\" Seated 1x10 5\"   Chin tuck c ext   1x10 1x10 1x10 1x10       Deep neck flexion             TB rows/extensions +rows YTB 1x20 2\"  +rows YTB 1x20 2\"  X15 ea red X15 ea red X15 ea red X20 ea red    +rows   YTB 1x12  2\"   No Moneys             Shoulder shrugs 1x10 seated 1x10 seated NP      1x10 seated 1x10 seated                Ther Ex             Pt education              Thoracic ext c 1/2 roll; hands behind head X20 5\" X20 5\" X20 5\" X15 5\" X15 5\" X15 5\"   X20 5\"  X20 5\"   Radial nerve glides             Median nerve glides     x15        Cervical Rotation SNAGs c strap 1x10 5\"  1x10 5\" 1x10 5\" ea 1x10 5\" ea     1x10 5\" 1x10 5\"   Scalene stretch 10\"x5 20\"x3  10\"x5 + UT     5\"x5 to R 5\"x5 to R    UBE retro for posture 5' L2  5' L2 NP 5'     5' L2 5' L2   Ther Activity             Standing Y raises  1x15 1x15 1# 1x15 2x7 2# 2x7 2#   1x10 1x15   Standing front raises   1x10+5 1# 5\" holds NV 1x15 2# 2x7 2#       Gait Training                                       Modalities                                                "

## 2024-03-21 ENCOUNTER — OFFICE VISIT (OUTPATIENT)
Dept: PHYSICAL THERAPY | Facility: CLINIC | Age: 44
End: 2024-03-21
Payer: COMMERCIAL

## 2024-03-21 DIAGNOSIS — M54.12 CERVICAL RADICULOPATHY: Primary | ICD-10-CM

## 2024-03-21 PROCEDURE — 97112 NEUROMUSCULAR REEDUCATION: CPT

## 2024-03-21 PROCEDURE — 97110 THERAPEUTIC EXERCISES: CPT

## 2024-03-21 NOTE — PROGRESS NOTES
"Daily Note     Today's date: 3/21/2024  Patient name: Ben Rivas  : 1980  MRN: [id number]  Referring provider: Amy Little*  Dx:   Encounter Diagnosis     ICD-10-CM    1. Cervical radiculopathy  M54.12                      Subjective: Pt reports the pain is currently in both shoulders and both sides of her neck.       Objective: See treatment diary below      Assessment: Tolerated treatment well. Pt demonstrates good effort throughout session. Minimal curing given to facilitate proper exercise performance and technique. Fatigue reported with UE strengthening exercises, encouraged rest break to allow muscular recovery between sets, She reports less pain in her shoulders and less stiffness after manual therapy. Patient demonstrated fatigue post treatment, exhibited good technique with therapeutic exercises, and would benefit from continued PT      Plan: Continue per plan of care.      Precautions: Hx of Diverticulitis, fibromyalgia     POC expires Unit limit Auth Expiration date PT/OT + Visit Limit?      BOMN                 Visit/Unit Tracking  AUTH Status:  Date 3/7 3/12 3/14 3/19 3/21          24 visits Used 13 14 15 16 17           Remaining  11 10 9 8 7            FOTO    FOTO done 2/15      Manuals 2/29 3/5 3/7 3/12 3/14 3/19 3/21 FOTO    Chin tuck ext     MM 3' 3'      SOR 2' 2' 3' 4'         L UT stretch 3x20\" 3x20\" 5x15\"  5x15\"    3x20\" 3x20\"    L LS stretch     5x15\"    5x10\"    Cerv traction c retraction      2' 2'      Neuro Re-Ed             Chin tucks Seated 1x10 5\" Seated 1x10 5\" Seated 1x10 5\" Seated 1x10 5\" 1x10 5\" 1x10 5\" 1x15 5\"  Seated 1x10   5\" Seated 1x10 5\"   Chin tuck c ext   1x10 1x10 1x10 1x10 1x10      Deep neck flexion             TB rows/extensions +rows YTB 1x20 2\"  +rows YTB 1x20 2\"  X15 ea red X15 ea red X15 ea red X20 ea red X20 ea red   +rows   YTB 1x12  2\"   No Moneys             Shoulder shrugs 1x10 seated 1x10 seated NP      1x10 seated 1x10 " "seated   Digiflex       3\" 2x10 red ea      Ther Ex             Pt education              Thoracic ext c 1/2 roll; hands behind head X20 5\" X20 5\" X20 5\" X15 5\" X15 5\" X15 5\" X15 5\"  X20 5\"  X20 5\"   Radial nerve glides             Median nerve glides     x15        Cervical Rotation SNAGs c strap 1x10 5\"  1x10 5\" 1x10 5\" ea 1x10 5\" ea     1x10 5\" 1x10 5\"   Scalene stretch 10\"x5 20\"x3  10\"x5 + UT     5\"x5 to R 5\"x5 to R    UBE retro for posture 5' L2  5' L2 NP 5'     5' L2 5' L2   Ther Activity             Standing Y raises  1x15 1x15 1# 1x15 2x7 2# 2x7 2# 2x7 2#  1x10 1x15   Standing front raises   1x10+5 1# 5\" holds NV 1x15 2# 2x7 2# 2x7 2#      Gait Training                                       Modalities                                                "

## 2024-03-26 ENCOUNTER — OFFICE VISIT (OUTPATIENT)
Dept: PHYSICAL THERAPY | Facility: CLINIC | Age: 44
End: 2024-03-26
Payer: COMMERCIAL

## 2024-03-26 DIAGNOSIS — M54.12 CERVICAL RADICULOPATHY: Primary | ICD-10-CM

## 2024-03-26 PROCEDURE — 97112 NEUROMUSCULAR REEDUCATION: CPT

## 2024-03-26 PROCEDURE — 97140 MANUAL THERAPY 1/> REGIONS: CPT

## 2024-03-26 PROCEDURE — 97110 THERAPEUTIC EXERCISES: CPT

## 2024-03-26 NOTE — PROGRESS NOTES
"Daily Note     Today's date: 3/26/2024  Patient name: Ben Rivas  : 1980  MRN: [id number]  Referring provider: Amy Little*  Dx:   Encounter Diagnosis     ICD-10-CM    1. Cervical radiculopathy  M54.12                      Subjective: Pt repots she had a headache over the weekend.       Objective: See treatment diary below      Assessment: Tolerated treatment well. Pt demonstrates good effort throughout session. Minimal curing given to facilitate proper exercise performance and technique. She reports no worsening of sx with exercises today. Some discomfort noted while performing cervical retractions. Patient demonstrated fatigue post treatment, exhibited good technique with therapeutic exercises, and would benefit from continued PT      Plan: Continue per plan of care.      Precautions: Hx of Diverticulitis, fibromyalgia     POC expires Unit limit Auth Expiration date PT/OT + Visit Limit?      BOMN                 Visit/Unit Tracking  AUTH Status:  Date 3/7 3/12 3/14 3/19 3/21 3/26         24 visits Used 13 14 15 16 17 16          Remaining  11 10 9 8 7 6           FOTO done 3/26        Manuals 2/29 3/5 3/7 3/12 3/14 3/19 3/21 3/26     Chin tuck ext     MM 3' 3' 3'     SOR 2' 2' 3' 4'         L UT stretch 3x20\" 3x20\" 5x15\"  5x15\"        L LS stretch     5x15\"        Cerv traction c retraction      2' 2' 2'     Neuro Re-Ed             Chin tucks Seated 1x10 5\" Seated 1x10 5\" Seated 1x10 5\" Seated 1x10 5\" 1x10 5\" 1x10 5\" 1x15 5\" 1x15 5\"     Chin tuck c ext   1x10 1x10 1x10 1x10 1x10 1x10     Deep neck flexion             TB rows/extensions +rows YTB 1x20 2\"  +rows YTB 1x20 2\"  X15 ea red X15 ea red X15 ea red X20 ea red X20 ea red X20 ea red     No Moneys             Shoulder shrugs 1x10 seated 1x10 seated NP          Digiflex       3\" 2x10 red ea 3\" 2x10 red ea     Ther Ex             Pt education              Thoracic ext c 1/2 roll; hands behind head X20 5\" X20 5\" X20 5\" X15 5\" X15 5\" " "X15 5\" X15 5\" X15 5\" full roll     Radial nerve glides             Median nerve glides     x15        Cervical Rotation SNAGs c strap 1x10 5\"  1x10 5\" 1x10 5\" ea 1x10 5\" ea         Scalene stretch 10\"x5 20\"x3  10\"x5 + UT         UBE retro for posture 5' L2  5' L2 NP 5'         Ther Activity             Standing Y raises  1x15 1x15 1# 1x15 2x7 2# 2x7 2# 2x7 2# 2x7 2#     Standing front raises   1x10+5 1# 5\" holds NV 1x15 2# 2x7 2# 2x7 2# 2x7 2#     Gait Training                                       Modalities                                                  "

## 2024-03-28 ENCOUNTER — APPOINTMENT (OUTPATIENT)
Dept: PHYSICAL THERAPY | Facility: CLINIC | Age: 44
End: 2024-03-28
Payer: COMMERCIAL

## 2024-04-02 ENCOUNTER — OFFICE VISIT (OUTPATIENT)
Dept: PHYSICAL THERAPY | Facility: CLINIC | Age: 44
End: 2024-04-02
Payer: COMMERCIAL

## 2024-04-02 DIAGNOSIS — M54.12 CERVICAL RADICULOPATHY: Primary | ICD-10-CM

## 2024-04-02 PROCEDURE — 97110 THERAPEUTIC EXERCISES: CPT

## 2024-04-02 PROCEDURE — 97140 MANUAL THERAPY 1/> REGIONS: CPT

## 2024-04-02 PROCEDURE — 97112 NEUROMUSCULAR REEDUCATION: CPT

## 2024-04-02 NOTE — PROGRESS NOTES
"Daily Note     Today's date: 2024  Patient name: Ben Rivas  : 1980  MRN: [id number]  Referring provider: Amy Little*  Dx:   Encounter Diagnosis     ICD-10-CM    1. Cervical radiculopathy  M54.12                      Subjective: Pt reports her pain is in her neck through her tricep and into her hand, like an achy pain.       Objective: See treatment diary below      Assessment: Tolerated treatment well. Pt demonstrates good effort throughout session. Minimal curing given to facilitate proper exercise performance and technique. She tolerates increased weight for Ys and front raises, and resistance for TB rows and extensions. Patient demonstrated fatigue post treatment, exhibited good technique with therapeutic exercises, and would benefit from continued PT      Plan: Continue per plan of care.      Precautions: Hx of Diverticulitis, fibromyalgia     POC expires Unit limit Auth Expiration date PT/OT + Visit Limit?      BOMN                 Visit/Unit Tracking  AUTH Status:  Date 3/7 3/12 3/14 3/19 3/21 3/26 4/2        24 visits Used 13 14 15 16 17 18 19         Remaining  11 10 9 8 7 6 5          FOTO done 3/26        Manuals 2/29 3/5 3/7 3/12 3/14 3/19 3/21 3/26 4/2    Chin tuck ext     MM 3' 3' 3' 3'    SOR 2' 2' 3' 4'         L UT stretch 3x20\" 3x20\" 5x15\"  5x15\"        L LS stretch     5x15\"        Cerv traction c retraction      2' 2' 2' 2'    Neuro Re-Ed             Chin tucks Seated 1x10 5\" Seated 1x10 5\" Seated 1x10 5\" Seated 1x10 5\" 1x10 5\" 1x10 5\" 1x15 5\" 1x15 5\" 1x15 5\"    Chin tuck c ext   1x10 1x10 1x10 1x10 1x10 1x10 1x10    Deep neck flexion             TB rows/extensions +rows YTB 1x20 2\"  +rows YTB 1x20 2\"  X15 ea red X15 ea red X15 ea red X20 ea red X20 ea red X20 ea red X20 ea grn    No Moneys             Shoulder shrugs 1x10 seated 1x10 seated NP          Digiflex       3\" 2x10 red ea 3\" 2x10 red ea 3\" 2x10 red     Ther Ex             Pt education            " "  Thoracic ext c 1/2 roll; hands behind head X20 5\" X20 5\" X20 5\" X15 5\" X15 5\" X15 5\" X15 5\" X15 5\" full roll X15 5\" full roll    Radial nerve glides             Median nerve glides     x15        Cervical Rotation SNAGs c strap 1x10 5\"  1x10 5\" 1x10 5\" ea 1x10 5\" ea         Scalene stretch 10\"x5 20\"x3  10\"x5 + UT         UBE retro for posture 5' L2  5' L2 NP 5'         Ther Activity             Standing Y raises  1x15 1x15 1# 1x15 2x7 2# 2x7 2# 2x7 2# 2x7 2# 2x10 2#    Standing front raises   1x10+5 1# 5\" holds NV 1x15 2# 2x7 2# 2x7 2# 2x7 2# 2x10 2#    Gait Training                                       Modalities                                                    "

## 2024-04-04 ENCOUNTER — OFFICE VISIT (OUTPATIENT)
Dept: PHYSICAL THERAPY | Facility: CLINIC | Age: 44
End: 2024-04-04
Payer: COMMERCIAL

## 2024-04-04 DIAGNOSIS — M54.12 CERVICAL RADICULOPATHY: Primary | ICD-10-CM

## 2024-04-04 PROCEDURE — 97140 MANUAL THERAPY 1/> REGIONS: CPT

## 2024-04-04 PROCEDURE — 97112 NEUROMUSCULAR REEDUCATION: CPT

## 2024-04-04 PROCEDURE — 97110 THERAPEUTIC EXERCISES: CPT

## 2024-04-04 NOTE — PROGRESS NOTES
"Daily Note     Today's date: 2024  Patient name: Ben Rivas  : 1980  MRN: [id number]  Referring provider: Amy Little*  Dx:   Encounter Diagnosis     ICD-10-CM    1. Cervical radiculopathy  M54.12                      Subjective: Pt reports the pain is a 5/10, in her neck into her upper arm/tricep.       Objective: See treatment diary below      Assessment: Tolerated treatment well. Pt demonstrates good effort throughout session. Minimal curing given to facilitate proper exercise performance and technique. She reports no increase in sx with UE strengthening exercises today. Less cervical and UE sx after manuals. Fatigue Patient demonstrated fatigue post treatment, exhibited good technique with therapeutic exercises, and would benefit from continued PT      Plan: Continue per plan of care.      Precautions: Hx of Diverticulitis, fibromyalgia     POC expires Unit limit Auth Expiration date PT/OT + Visit Limit?      BOMN                 Visit/Unit Tracking  AUTH Status:  Date 3/7 3/12 3/14 3/19 3/21 3/26 4/2 4/4       24 visits Used 13 14 15 16 17 18 19 20        Remaining  11 10 9 8 7 6 5 4         FOTO done 3/26        Manuals 2/29 3/5 3/7 3/12 3/14 3/19 3/21 3/26 4/2 4/4   Chin tuck ext     MM 3' 3' 3' 3' 3'   SOR 2' 2' 3' 4'         L UT stretch 3x20\" 3x20\" 5x15\"  5x15\"        L LS stretch     5x15\"        Cerv traction c retraction      2' 2' 2' 2' 2'   Neuro Re-Ed             Chin tucks Seated 1x10 5\" Seated 1x10 5\" Seated 1x10 5\" Seated 1x10 5\" 1x10 5\" 1x10 5\" 1x15 5\" 1x15 5\" 1x15 5\" 1x15 5\"   Chin tuck c ext   1x10 1x10 1x10 1x10 1x10 1x10 1x10 1x10   Deep neck flexion             TB rows/extensions +rows YTB 1x20 2\"  +rows YTB 1x20 2\"  X15 ea red X15 ea red X15 ea red X20 ea red X20 ea red X20 ea red X20 ea grn X20 ea grn   No Moneys             Shoulder shrugs 1x10 seated 1x10 seated NP          Digiflex       3\" 2x10 red ea 3\" 2x10 red ea 3\" 2x10 red  3\" 2x10 red    Ther Ex   " "          Pt education              Thoracic ext c 1/2 roll; hands behind head X20 5\" X20 5\" X20 5\" X15 5\" X15 5\" X15 5\" X15 5\" X15 5\" full roll X15 5\" full roll X15 5\" full roll   Radial nerve glides             Median nerve glides     x15        Cervical Rotation SNAGs c strap 1x10 5\"  1x10 5\" 1x10 5\" ea 1x10 5\" ea         Scalene stretch 10\"x5 20\"x3  10\"x5 + UT         UBE retro for posture 5' L2  5' L2 NP 5'         Ther Activity             Standing Y raises  1x15 1x15 1# 1x15 2x7 2# 2x7 2# 2x7 2# 2x7 2# 2x10 2# 2x10 2#   Standing front raises   1x10+5 1# 5\" holds NV 1x15 2# 2x7 2# 2x7 2# 2x7 2# 2x10 2# 2x10 2#   Gait Training                                       Modalities                                                      "

## 2024-04-09 ENCOUNTER — OFFICE VISIT (OUTPATIENT)
Dept: PHYSICAL THERAPY | Facility: CLINIC | Age: 44
End: 2024-04-09
Payer: COMMERCIAL

## 2024-04-09 DIAGNOSIS — M54.12 CERVICAL RADICULOPATHY: Primary | ICD-10-CM

## 2024-04-09 PROCEDURE — 97112 NEUROMUSCULAR REEDUCATION: CPT

## 2024-04-09 PROCEDURE — 97110 THERAPEUTIC EXERCISES: CPT

## 2024-04-09 PROCEDURE — 97530 THERAPEUTIC ACTIVITIES: CPT

## 2024-04-09 NOTE — PROGRESS NOTES
"Daily Note     Today's date: 2024  Patient name: Ben Rivas  : 1980  MRN: [id number]  Referring provider: Amy Little*  Dx:   Encounter Diagnosis     ICD-10-CM    1. Cervical radiculopathy  M54.12           Start Time: 1545  Stop Time: 1630  Total time in clinic (min): 45 minutes    Subjective: Pt reports still having difficulty turning her head to the left and her L arm feels heavy.      Objective: See treatment diary below      Assessment: Tolerated treatment well. Patient demonstrated fatigue post treatment, exhibited good technique with therapeutic exercises, and would benefit from continued PT. Pt reports difficulty turning head to L, ROM is mostly intact passively and greater than her active motion. Active motion did improve after SNAGs and manual stretching. Pt had poor tolerance to moderate overpressure with cervical retraction, better tolerance and felt better after doing mild overpressure with concomitant traction.      Plan: Continue per plan of care.  Progress treatment as tolerated.       Precautions: Hx of Diverticulitis, fibromyalgia     POC expires Unit limit Auth Expiration date PT/OT + Visit Limit?      BOMN                 Visit/Unit Tracking  AUTH Status:  Date 3/7 3/12 3/14 3/19 3/21 3/26 4/2 4/4 4/9      24 visits Used 13 14 15 16 17 18 19 20 21       Remaining  11 10 9 8 7 6 5 4 3        FOTO done 3/26        Manuals 4/9   3/12 3/14 3/19 3/21 3/26 4/2 4/4   Chin tuck ext 1'    MM 3' 3' 3' 3' 3'   SOR    4'         L UT stretch 3x15\"    5x15\"        L LS stretch     5x15\"        Cerv traction c retraction 2'     2' 2' 2' 2' 2'                Neuro Re-Ed             Chin tucks 1x15 5\"   Seated 1x10 5\" 1x10 5\" 1x10 5\" 1x15 5\" 1x15 5\" 1x15 5\" 1x15 5\"   Chin tuck c ext 1x10   1x10 1x10 1x10 1x10 1x10 1x10 1x10   Deep neck flexion             TB rows/extensions X20 grn   X15 ea red X15 ea red X20 ea red X20 ea red X20 ea red X20 ea grn X20 ea grn   No Moneys          " "   Shoulder shrugs             Digiflex       3\" 2x10 red ea 3\" 2x10 red ea 3\" 2x10 red  3\" 2x10 red    Ther Ex             Pt education              Thoracic ext c full roll; hands behind head X10 5\"   X15 5\" X15 5\" X15 5\" X15 5\" X15 5\" full roll X15 5\" full roll X15 5\" full roll   Radial nerve glides 2-end tensioner 1x10            Median nerve glides 2-end tensioner 1x10    x15        Cervical Rotation SNAGs c strap 1x5 5\"   1x10 5\" ea         Scalene stretch    10\"x5 + UT         UBE retro for posture    5'         Ther Activity             Standing Y raises    1# 1x15 2x7 2# 2x7 2# 2x7 2# 2x7 2# 2x10 2# 2x10 2#   Standing front raises 1x10 3#   NV 1x15 2# 2x7 2# 2x7 2# 2x7 2# 2x10 2# 2x10 2#   Standing lat raises 1x10 3#            Gait Training                                       Modalities                                                        "

## 2024-04-11 ENCOUNTER — OFFICE VISIT (OUTPATIENT)
Dept: PHYSICAL THERAPY | Facility: CLINIC | Age: 44
End: 2024-04-11
Payer: COMMERCIAL

## 2024-04-11 DIAGNOSIS — M54.12 CERVICAL RADICULOPATHY: Primary | ICD-10-CM

## 2024-04-11 PROCEDURE — 97110 THERAPEUTIC EXERCISES: CPT

## 2024-04-11 PROCEDURE — 97112 NEUROMUSCULAR REEDUCATION: CPT

## 2024-04-11 PROCEDURE — 97530 THERAPEUTIC ACTIVITIES: CPT

## 2024-04-11 NOTE — PROGRESS NOTES
"Daily Note     Today's date: 2024  Patient name: Ben Rivas  : 1980  MRN: [id number]  Referring provider: Amy Little*  Dx:   Encounter Diagnosis     ICD-10-CM    1. Cervical radiculopathy  M54.12           Start Time: 1545  Stop Time: 1630  Total time in clinic (min): 45 minutes    Subjective: Pt reports feeling a good crack in her neck when she stretches her head back into extension.      Objective: See treatment diary below      Assessment: Tolerated treatment well. Patient demonstrated fatigue post treatment, exhibited good technique with therapeutic exercises, and would benefit from continued PT. Pt demonstrating continued fatigue with cervical ROM and difficulty turning head to L. Continue to promote centralization of sx with cervical retractions and frequent performance of these.      Plan: Continue per plan of care.  Progress treatment as tolerated.       Precautions: Hx of Diverticulitis, fibromyalgia     POC expires Unit limit Auth Expiration date PT/OT + Visit Limit?      BOMN                 Visit/Unit Tracking  AUTH Status:  Date 3/7 3/12 3/14 3/19 3/21 3/26 4/2 4/4 4/9 4/11     24 visits Used 13 14 15 16 17 18 19 20 21 22      Remaining  11 10 9 8 7 6 5 4 3 2       FOTO done 3/26        Manuals 4/9 4/11  3/12 3/14 3/19 3/21 3/26 4/2 4/4   Chin tuck ext 1'    MM 3' 3' 3' 3' 3'   SOR    4'         L UT stretch 3x15\"    5x15\"        L LS stretch     5x15\"        Cerv traction c retraction 2'     2' 2' 2' 2' 2'                Neuro Re-Ed             Chin tucks 1x15 5\" 1x10 5\"  Seated 1x10 5\" 1x10 5\" 1x10 5\" 1x15 5\" 1x15 5\" 1x15 5\" 1x15 5\"   Chin tuck c ext 1x10 1x10  1x10 1x10 1x10 1x10 1x10 1x10 1x10   Deep neck flexion             TB rows/extensions X20 grn X20 ea grn   X15 ea red X15 ea red X20 ea red X20 ea red X20 ea red X20 ea grn X20 ea grn   No Moneys             Shoulder shrugs             Digiflex       3\" 2x10 red ea 3\" 2x10 red ea 3\" 2x10 red  3\" 2x10 red  " "  Ther Ex             Pt education              Thoracic ext c FR; hands behind head X10 5\" X10 5\" on floor  X15 5\" X15 5\" X15 5\" X15 5\" X15 5\" full roll X15 5\" full roll X15 5\" full roll   Radial nerve glides 2-end tensioner 1x10 1x10           Median nerve glides 2-end tensioner 1x10 1x10   x15        Cervical Rotation SNAGs c strap 1x5 5\"   1x10 5\" ea         Scalene stretch    10\"x5 + UT         UBE retro for posture    5'         L sidebending isometric  1x5 5\"           Ther Activity             Standing Y raises    1# 1x15 2x7 2# 2x7 2# 2x7 2# 2x7 2# 2x10 2# 2x10 2#   Standing front raises 1x10 3# 1x10 3#  NV 1x15 2# 2x7 2# 2x7 2# 2x7 2# 2x10 2# 2x10 2#   Standing lat raises 1x10 3# 1x10 3#           Gait Training                                       Modalities                                                          "

## 2024-04-16 ENCOUNTER — OFFICE VISIT (OUTPATIENT)
Dept: PHYSICAL THERAPY | Facility: CLINIC | Age: 44
End: 2024-04-16
Payer: COMMERCIAL

## 2024-04-16 DIAGNOSIS — M54.12 CERVICAL RADICULOPATHY: Primary | ICD-10-CM

## 2024-04-16 PROCEDURE — 97110 THERAPEUTIC EXERCISES: CPT

## 2024-04-16 PROCEDURE — 97112 NEUROMUSCULAR REEDUCATION: CPT

## 2024-04-16 PROCEDURE — 97530 THERAPEUTIC ACTIVITIES: CPT

## 2024-04-16 NOTE — PROGRESS NOTES
"Daily Note     Today's date: 2024  Patient name: Ben Rivas  : 1980  MRN: [id number]  Referring provider: Amy Little*  Dx:   Encounter Diagnosis     ICD-10-CM    1. Cervical radiculopathy  M54.12                      Subjective: Pt reports when she looks to the left it increases the sx down her L UE.       Objective: See treatment diary below      Assessment: Tolerated treatment well. Pt demonstrates good effort throughout session. Minimal curing given to facilitate proper exercise performance and technique. She reports UE fatigue but no increase in pain with exercises. Patient demonstrated fatigue post treatment, exhibited good technique with therapeutic exercises, and would benefit from continued PT      Plan: Continue per plan of care.      Precautions: Hx of Diverticulitis, fibromyalgia     POC expires Unit limit Auth Expiration date PT/OT + Visit Limit?      BOMN                 Visit/Unit Tracking  AUTH Status:  Date 3/7 3/12 3/14 3/19 3/21 3/26 4/2 4/4 4/9 4/11 4/16    24 visits Used 13 14 15 16 17 18 19 20 21 22 23     Remaining  11 10 9 8 7 6 5 4 3 2 1      FOTO done 3/26        Manuals 4/9 4/11 4/16   3/19 3/21 3/26 4/2 4/4   Chin tuck ext 1'     3' 3' 3' 3' 3'   SOR             L UT stretch 3x15\"            L LS stretch             Cerv traction c retraction 2'     2' 2' 2' 2' 2'                Neuro Re-Ed             Chin tucks 1x15 5\" 1x10 5\" 1x10 5\"   1x10 5\" 1x15 5\" 1x15 5\" 1x15 5\" 1x15 5\"   Chin tuck c ext 1x10 1x10 1x10   1x10 1x10 1x10 1x10 1x10   Deep neck flexion             TB rows/extensions X20 grn X20 ea grn  X20 ea grn    X20 ea red X20 ea red X20 ea red X20 ea grn X20 ea grn   No Moneys             Shoulder shrugs             Digiflex       3\" 2x10 red ea 3\" 2x10 red ea 3\" 2x10 red  3\" 2x10 red    Ther Ex             Pt education              Thoracic ext c FR; hands behind head X10 5\" X10 5\" on floor X10 5\" on floor   X15 5\" X15 5\" X15 5\" full roll X15 5\" " "full roll X15 5\" full roll   Radial nerve glides 2-end tensioner 1x10 1x10 1x10          Median nerve glides 2-end tensioner 1x10 1x10 1x10          Cervical Rotation SNAGs c strap 1x5 5\"            Scalene stretch             UBE retro for posture             L sidebending isometric  1x5 5\" 1x10 5\"          Ther Activity             Standing Y raises      2x7 2# 2x7 2# 2x7 2# 2x10 2# 2x10 2#   Standing front raises 1x10 3# 1x10 3# 1x10 3#   2x7 2# 2x7 2# 2x7 2# 2x10 2# 2x10 2#   Standing lat raises 1x10 3# 1x10 3# 1x10 3#          Gait Training                                       Modalities                                                            "

## 2024-04-18 ENCOUNTER — EVALUATION (OUTPATIENT)
Dept: PHYSICAL THERAPY | Facility: CLINIC | Age: 44
End: 2024-04-18
Payer: COMMERCIAL

## 2024-04-18 DIAGNOSIS — M54.12 CERVICAL RADICULOPATHY: Primary | ICD-10-CM

## 2024-04-18 PROCEDURE — 97530 THERAPEUTIC ACTIVITIES: CPT

## 2024-04-18 PROCEDURE — 97110 THERAPEUTIC EXERCISES: CPT

## 2024-04-18 NOTE — PROGRESS NOTES
PT Re-Evaluation     Today's date: 2024  Patient name: Ben Rivas  : 1980  MRN: [id number]  Referring provider: Amy Little*  Dx:   Encounter Diagnosis     ICD-10-CM    1. Cervical radiculopathy  M54.12             Start Time: 1545  Stop Time: 1633  Total time in clinic (min): 48 minutes    Assessment  Assessment details: Pt is a 43 y.o. female presenting to physical therapy with chief complaint of radicular pain and weakness in LUE. Upon re-evaluation, pt demonstrates further improvements of cervical ROM. Pt demonstrates similar midrange UE strength measurements as prior RE but is demonstrating improved endurance based on progressions made with exercises in clinic. Pt still has impairments with LUE and left sided cervical movements resulting in limitations with bending, lifting, twisting, and household activities. Continue to focus on restoring functional strength and ROM. Pt is a good candidate for continued skilled PT to address impairments and facilitate return to prior level of function.  Impairments: abnormal muscle tone, abnormal or restricted ROM, activity intolerance, impaired physical strength, pain with function and poor posture   Functional limitations: bending, lifting, twisting, and household activities  Goals  STG 4 - weeks  1. Patient will demonstrate improved cervical extension by 5 degrees or more to facilitate functional ability. -met  2. Patient will demonstrate improved L shoulder ER strength to 3+/5 to facilitate functional ability. -met  LTG 8 - weeks  1. Patient will demonstrate decreased pain at worst with twisting activities by 2 levels or better to facilitate functional ability. -met  2. Patient will demonstrate increased FOTO score by 10 points or more from baseline. -ongoing    Plan  Patient would benefit from: PT eval and skilled physical therapy  Planned modality interventions: cryotherapy, thermotherapy: hydrocollator packs and unattended electrical  stimulation  Planned therapy interventions: abdominal trunk stabilization, ADL retraining, body mechanics training, home exercise program, flexibility, joint mobilization, manual therapy, neuromuscular re-education, nerve gliding, massage, patient education, postural training, strengthening, stretching, therapeutic activities and therapeutic exercise  Frequency: 2x week  Duration in weeks: 8  Plan of Care beginning date: 2024  Plan of Care expiration date: 2024  Treatment plan discussed with: patient      Subjective Evaluation    History of Present Illness  Mechanism of injury: Pt reports she got a migraine in  with an aura and it resulted in numbness in her L hand that still persists in her third and fourth fingers. Pt having L-sided weakness across her whole L arm and leg. Awake routine EEG was normal, EMG showed C6-C7 radiculopathy. 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 Pt is still having pain across her L side, so she is having difficulty with lifting and bending, as well as with moving things across her body in a twisting motion due flank pain. Neuro mentioned potential whiplash from a prior fall, then did have a second fall in 2023.     Upon re-evaluation, pt reports she is getting less pain than she was before. Pt does still get numbness/tingling in her L hand, as well as some pain in her upper arm. Pt still getting occasional headaches but not as severe in duration or intensity as they were before. Pt less often getting sharp pains that get up to 9/10 intensity but still occasionally gets up.  Patient Goals  Patient goals for therapy: decreased pain and increased strength  Patient goal: Better posture, be able to do yoga/dancing  Pain  Current pain ratin  At best pain ratin  At worst pain ratin  Location: proximal LUE and LLE  Quality: dull ache and sharp  Relieving factors: rest  Aggravating factors: lifting, walking and  sitting          Objective     Postural Observations  Seated posture: poor  Standing posture: poor    Additional Postural Observation Details  Forward head: lower cervical flexion and upper cervical extension    Palpation   Left   Tenderness of the suboccipitals.     Right   Tenderness of the suboccipitals.     Neurological Testing     Additional Neurological Details  Reported hyposensation in C6-C8 and reported hypersensation C2-C5    Active Range of Motion   Cervical/Thoracic Spine       Cervical    Flexion: 25 degrees  Restriction level: moderate  Extension: 43 degrees     Restriction level: moderate  Left lateral flexion: 30 degrees     Restriction level: minimal  Right lateral flexion: 33 degrees     Restriction level minimal  Left rotation: 64 degrees Restriction level: minimal  Right rotation: 66 degrees    Restriction level: minimal  Mechanical Assessment    Cervical    Seated retraction: repeated movements   Pain intensity: better    Thoracic      Lumbar      Strength/Myotome Testing     Left Shoulder     Planes of Motion   Flexion: 4-   Extension: 4-   Abduction: 4-   External rotation at 0°: 4-   Internal rotation at 0°: 4-     Isolated Muscles   Biceps: 4   Lower trapezius: 3+   Middle trapezius: 3+   Triceps: 4-     Right Shoulder     Planes of Motion   Flexion: 4   Extension: 4   Abduction: 4   External rotation at 0°: 4   Internal rotation at 0°: 4     Isolated Muscles   Biceps: 4+   Lower trapezius: 3+   Middle trapezius: 3+   Triceps: 4     Left Wrist/Hand      (2nd hand position)     Trial 1: 30    Right Wrist/Hand      (2nd hand position)     Trial 1: 55    Tests     Right Shoulder   Positive ULTT1 and ULTT4.              Precautions: Hx of Diverticulitis, fibromyalgia     POC expires Unit limit Auth Expiration date PT/OT + Visit Limit?   5/14   BOMN                 Visit/Unit Tracking  AUTH Status:  Date 3/7 3/12 3/14 3/19 3/21 3/26 4/2 4/4 4/9 4/11 4/16 4/18   24 visits Used 13 14 15 16  "17 18 19 20 21 22 23 24    Remaining  11 10 9 8 7 6 5 4 3 2 1 0     FOTO done 3/26        Manuals 4/9 4/11 4/16 4/18  3/19 3/21 3/26 4/2 4/4   Chin tuck ext 1'     3' 3' 3' 3' 3'   SOR             L UT stretch 3x15\"            L LS stretch             Cerv traction c retraction 2'   3'  2' 2' 2' 2' 2'                Neuro Re-Ed             Chin tucks 1x15 5\" 1x10 5\" 1x10 5\" 1x10 5\"  1x10 5\" 1x15 5\" 1x15 5\" 1x15 5\" 1x15 5\"   Chin tuck c ext 1x10 1x10 1x10 1x10  1x10 1x10 1x10 1x10 1x10   Deep neck flexion             TB rows/extensions X20 grn X20 ea grn  X20 ea grn  X20 ea blue  X20 ea red X20 ea red X20 ea red X20 ea grn X20 ea grn   No Moneys             Shoulder shrugs             Digiflex       3\" 2x10 red ea 3\" 2x10 red ea 3\" 2x10 red  3\" 2x10 red    Ther Ex             Pt education              Thoracic ext c FR; hands behind head X10 5\" X10 5\" on floor X10 5\" on floor   X15 5\" X15 5\" X15 5\" full roll X15 5\" full roll X15 5\" full roll   Radial nerve glides 2-end tensioner 1x10 1x10 1x10 1x10         Median nerve glides 2-end tensioner 1x10 1x10 1x10 1x10         Cervical Rotation SNAGs c strap 1x5 5\"            Scalene stretch             UBE retro for posture             L sidebending isometric  1x5 5\" 1x10 5\" 1x10 5\"         Ther Activity             Standing Y raises      2x7 2# 2x7 2# 2x7 2# 2x10 2# 2x10 2#   Standing front raises 1x10 3# 1x10 3# 1x10 3# 2x10 3#  2x7 2# 2x7 2# 2x7 2# 2x10 2# 2x10 2#   Standing lat raises 1x10 3# 1x10 3# 1x10 3# 1x10 3#         Gait Training                                       Modalities                                                "

## 2024-04-22 ENCOUNTER — OFFICE VISIT (OUTPATIENT)
Dept: PHYSICAL THERAPY | Facility: CLINIC | Age: 44
End: 2024-04-22
Payer: COMMERCIAL

## 2024-04-22 DIAGNOSIS — M54.12 CERVICAL RADICULOPATHY: Primary | ICD-10-CM

## 2024-04-22 PROCEDURE — 97140 MANUAL THERAPY 1/> REGIONS: CPT

## 2024-04-22 PROCEDURE — 97112 NEUROMUSCULAR REEDUCATION: CPT

## 2024-04-22 PROCEDURE — 97110 THERAPEUTIC EXERCISES: CPT

## 2024-04-22 NOTE — PROGRESS NOTES
"Daily Note     Today's date: 2024  Patient name: Ben Rivas  : 1980  MRN: [id number]  Referring provider: Amy Little*  Dx:   Encounter Diagnosis     ICD-10-CM    1. Cervical radiculopathy  M54.12                      Subjective: Pt reports she doesn't feel too bad today.      Objective: See treatment diary below      Assessment: Tolerated treatment well. Pt demonstrates good effort throughout session. Minimal curing given to facilitate proper exercise performance and technique. She tolerates all UE and postural strengthening exercises well. She reports less sx into her L UE after manual therapy. Patient demonstrated fatigue post treatment, exhibited good technique with therapeutic exercises, and would benefit from continued PT      Plan: Continue per plan of care.      Precautions: Hx of Diverticulitis, fibromyalgia     POC expires Unit limit Auth Expiration date PT/OT + Visit Limit?      BOMN                 Visit/Unit Tracking  AUTH Status:  Date 4/22    3/21 3/26 4/2 4/4 4/9 4/11 4/16 4/18   24 visits Used 25    17 18 19 20 21 22 23 24    Remaining      7 6 5 4 3 2 1 0     FOTO done 3/26        Manuals    Chin tuck ext 1'        3' 3'   SOR             L UT stretch 3x15\"            L LS stretch             Cerv traction c retraction 2'   3' 3'    2' 2'                Neuro Re-Ed             Chin tucks 1x15 5\" 1x10 5\" 1x10 5\" 1x10 5\" 1x10 5\"    1x15 5\" 1x15 5\"   Chin tuck c ext 1x10 1x10 1x10 1x10 1x10    1x10 1x10   Deep neck flexion             TB rows/extensions X20 grn X20 ea grn  X20 ea grn  X20 ea blue X20 ea blue    X20 ea grn X20 ea grn   No Moneys             Shoulder shrugs             Digiflex         3\" 2x10 red  3\" 2x10 red    Ther Ex             Pt education              Thoracic ext c FR; hands behind head X10 5\" X10 5\" on floor X10 5\" on floor      X15 5\" full roll X15 5\" full roll   Radial nerve glides 2-end tensioner 1x10 1x10 " "1x10 1x10 1x10        Median nerve glides 2-end tensioner 1x10 1x10 1x10 1x10 1x10        Cervical Rotation SNAGs c strap 1x5 5\"            Scalene stretch             UBE retro for posture             L sidebending isometric  1x5 5\" 1x10 5\" 1x10 5\" 1x10 5\"        Ther Activity             Standing Y raises         2x10 2# 2x10 2#   Standing front raises 1x10 3# 1x10 3# 1x10 3# 2x10 3# 2x10 3#    2x10 2# 2x10 2#   Standing lat raises 1x10 3# 1x10 3# 1x10 3# 1x10 3# 1x10 3#        Gait Training                                       Modalities                                                "

## 2024-04-23 ENCOUNTER — APPOINTMENT (OUTPATIENT)
Dept: PHYSICAL THERAPY | Facility: CLINIC | Age: 44
End: 2024-04-23
Payer: COMMERCIAL

## 2024-04-25 ENCOUNTER — OFFICE VISIT (OUTPATIENT)
Dept: PHYSICAL THERAPY | Facility: CLINIC | Age: 44
End: 2024-04-25
Payer: COMMERCIAL

## 2024-04-25 DIAGNOSIS — M54.12 CERVICAL RADICULOPATHY: Primary | ICD-10-CM

## 2024-04-25 PROCEDURE — 97112 NEUROMUSCULAR REEDUCATION: CPT

## 2024-04-25 PROCEDURE — 97140 MANUAL THERAPY 1/> REGIONS: CPT

## 2024-04-25 NOTE — PROGRESS NOTES
"Daily Note     Today's date: 2024  Patient name: Ben Rivas  : 1980  MRN: [id number]  Referring provider: Amy Little*  Dx:   Encounter Diagnosis     ICD-10-CM    1. Cervical radiculopathy  M54.12           Start Time: 1545  Stop Time: 1635  Total time in clinic (min): 50 minutes    Subjective: Pt reports feeling some tightness up behind her L ear.      Objective: See treatment diary below      Assessment: Tolerated treatment well. Patient demonstrated fatigue post treatment, exhibited good technique with therapeutic exercises, and would benefit from continued PT. Tightness improved after cervical retraction overpressure and sidebending stretching. Discussed doing upper trap and levator scap stretching at home if she feels tightness there. Progressed to longer duration holds for scapular stabilization exercises, pt fatigued by this.      Plan: Continue per plan of care.  Progress treatment as tolerated.       Precautions: Hx of Diverticulitis, fibromyalgia     POC expires Unit limit Auth Expiration date PT/OT + Visit Limit?      BOMN                 Visit/Unit Tracking  AUTH Status:  Date 4/22    3/21 3/26 4/2 4/4 4/9 4/11 4/16 4/18   24 visits Used 25    17 18 19 20 21 22 23 24    Remaining      7 6 5 4 3 2 1 0     FOTO done 3/26        Manuals    Chin tuck ext 1'        3' 3'   SOR             L UT stretch 3x15\"     1x5 10\"       L LS stretch      1x5 10\"       Cerv traction c retraction 2'   3' 3' 3'   2' 2'                Neuro Re-Ed             Chin tucks 1x15 5\" 1x10 5\" 1x10 5\" 1x10 5\" 1x10 5\" 1x10 5\"   1x15 5\" 1x15 5\"   Chin tuck c ext 1x10 1x10 1x10 1x10 1x10 1x10   1x10 1x10   Deep neck flexion             TB rows/extensions X20 grn X20 ea grn  X20 ea grn  X20 ea blue X20 ea blue 20x5\" ea blue   X20 ea grn X20 ea grn   No Moneys             Digiflex         3\" 2x10 red  3\" 2x10 red    Ther Ex             Pt education            " "  Thoracic ext c FR; hands behind head X10 5\" X10 5\" on floor X10 5\" on floor      X15 5\" full roll X15 5\" full roll   Radial nerve glides 2-end tensioner 1x10 1x10 1x10 1x10 1x10        Median nerve glides 2-end tensioner 1x10 1x10 1x10 1x10 1x10        Cervical Rotation SNAGs c strap 1x5 5\"            Scalene stretch             UBE retro for posture             L sidebending isometric  1x5 5\" 1x10 5\" 1x10 5\" 1x10 5\" 1x15 5\"       Ther Activity             Standing Y raises         2x10 2# 2x10 2#   Standing front raises 1x10 3# 1x10 3# 1x10 3# 2x10 3# 2x10 3# 2x10 3#   2x10 2# 2x10 2#   Standing lat raises 1x10 3# 1x10 3# 1x10 3# 1x10 3# 1x10 3# 2x10 3#       Gait Training                                       Modalities                                                  "

## 2024-04-30 ENCOUNTER — OFFICE VISIT (OUTPATIENT)
Dept: PHYSICAL THERAPY | Facility: CLINIC | Age: 44
End: 2024-04-30
Payer: COMMERCIAL

## 2024-04-30 DIAGNOSIS — M54.12 CERVICAL RADICULOPATHY: Primary | ICD-10-CM

## 2024-04-30 PROCEDURE — 97140 MANUAL THERAPY 1/> REGIONS: CPT

## 2024-04-30 PROCEDURE — 97530 THERAPEUTIC ACTIVITIES: CPT

## 2024-04-30 PROCEDURE — 97112 NEUROMUSCULAR REEDUCATION: CPT

## 2024-04-30 NOTE — PROGRESS NOTES
"Daily Note     Today's date: 2024  Patient name: Ben Rivas  : 1980  MRN: [id number]  Referring provider: Amy Little*  Dx:   Encounter Diagnosis     ICD-10-CM    1. Cervical radiculopathy  M54.12           Start Time: 1545  Stop Time: 1630  Total time in clinic (min): 45 minutes    Subjective: Pt reports some soreness in R side of neck and some difficulty with looking down and to the L.      Objective: See treatment diary below      Assessment: Tolerated treatment well. Patient demonstrated fatigue post treatment, exhibited good technique with therapeutic exercises, and would benefit from continued PT. Stretched tight musculature in neck with good response. Pt continues to have difficulty with thoracic extension stretching but responds well to manual cervical traction/retraction and had improved cervical extension and overhead shoulder flexion after performance.      Plan: Continue per plan of care.  Progress treatment as tolerated.       Precautions: Hx of Diverticulitis, fibromyalgia     POC expires Unit limit Auth Expiration date PT/OT + Visit Limit?      BOMN                 Visit/Unit Tracking  AUTH Status:  Date               16 visits Used 3               Remaining  13                FOTO done 3/26        Manuals       Chin tuck ext 1'            SOR             L UT stretch 3x15\"     1x5 10\" 1x5 10\" B      L LS stretch      1x5 10\" 1x5 10\" B      Cerv traction c retraction 2'   3' 3' 3' 4'                   Neuro Re-Ed             Chin tucks 1x15 5\" 1x10 5\" 1x10 5\" 1x10 5\" 1x10 5\" 1x10 5\" 1x10 5\"      Chin tuck c ext 1x10 1x10 1x10 1x10 1x10 1x10 1x10      Deep neck flexion             TB rows/extensions X20 grn X20 ea grn  X20 ea grn  X20 ea blue X20 ea blue 20x5\" ea blue 20x5\" ea blue      No Moneys             Digiflex             Ther Ex             Pt education              Thoracic ext c FR; hands behind head X10 5\" X10 5\" on floor " "X10 5\" on floor          Radial nerve glides 2-end tensioner 1x10 1x10 1x10 1x10 1x10        Median nerve glides 2-end tensioner 1x10 1x10 1x10 1x10 1x10        Cervical Rotation SNAGs c strap 1x5 5\"            Scalene stretch             UBE retro for posture             L sidebending isometric  1x5 5\" 1x10 5\" 1x10 5\" 1x10 5\" 1x15 5\"       Ther Activity             Standing Y raises             Standing front raises 1x10 3# 1x10 3# 1x10 3# 2x10 3# 2x10 3# 2x10 3# 2x10 3#      Standing lat raises 1x10 3# 1x10 3# 1x10 3# 1x10 3# 1x10 3# 2x10 3# 2x10 3#      Gait Training                                       Modalities                                                    "

## 2024-05-02 ENCOUNTER — OFFICE VISIT (OUTPATIENT)
Dept: PHYSICAL THERAPY | Facility: CLINIC | Age: 44
End: 2024-05-02
Payer: COMMERCIAL

## 2024-05-02 DIAGNOSIS — M54.12 CERVICAL RADICULOPATHY: Primary | ICD-10-CM

## 2024-05-02 PROCEDURE — 97530 THERAPEUTIC ACTIVITIES: CPT

## 2024-05-02 PROCEDURE — 97140 MANUAL THERAPY 1/> REGIONS: CPT

## 2024-05-02 PROCEDURE — 97112 NEUROMUSCULAR REEDUCATION: CPT

## 2024-05-02 NOTE — PROGRESS NOTES
"Daily Note     Today's date: 2024  Patient name: Ben Rivas  : 1980  MRN: [id number]  Referring provider: Amy Little*  Dx:   Encounter Diagnosis     ICD-10-CM    1. Cervical radiculopathy  M54.12                      Subjective: Pt reports her pain is a 4-6 depending on the area of her arm.       Objective: See treatment diary below      Assessment: Tolerated treatment well. Pt demonstrates good effort throughout session. Minimal curing given to facilitate proper exercise performance and technique. She reports fatigue with UE strengthening exericses and good stretches with manuals. She reports less pain by end of session. Patient demonstrated fatigue post treatment, exhibited good technique with therapeutic exercises, and would benefit from continued PT      Plan: Continue per plan of care.      Precautions: Hx of Diverticulitis, fibromyalgia     POC expires Unit limit Auth Expiration date PT/OT + Visit Limit?      BOMN                 Visit/Unit Tracking  AUTH Status:  Date              16 visits Used 3               Remaining  13 12               FOTO done 3/26        Manuals      Chin tuck ext 1'            SOR             L UT stretch 3x15\"     1x5 10\" 1x5 10\" B 1x5 10\" B     L LS stretch      1x5 10\" 1x5 10\" B 1x5 10\" B     Cerv traction c retraction 2'   3' 3' 3' 4' 4'                  Neuro Re-Ed             Chin tucks 1x15 5\" 1x10 5\" 1x10 5\" 1x10 5\" 1x10 5\" 1x10 5\" 1x10 5\" 1x10 5\"     Chin tuck c ext 1x10 1x10 1x10 1x10 1x10 1x10 1x10 1x10     Deep neck flexion             TB rows/extensions X20 grn X20 ea grn  X20 ea grn  X20 ea blue X20 ea blue 20x5\" ea blue 20x5\" ea blue 20x5\" ea blue     No Moneys             Digiflex             Ther Ex             Pt education              Thoracic ext c FR; hands behind head X10 5\" X10 5\" on floor X10 5\" on floor     X10 5\" on floor     Radial nerve glides 2-end tensioner 1x10 1x10 1x10 " "1x10 1x10        Median nerve glides 2-end tensioner 1x10 1x10 1x10 1x10 1x10        Cervical Rotation SNAGs c strap 1x5 5\"            Scalene stretch             UBE retro for posture             L sidebending isometric  1x5 5\" 1x10 5\" 1x10 5\" 1x10 5\" 1x15 5\"  1x15 5\"     Ther Activity             Standing Y raises             Standing front raises 1x10 3# 1x10 3# 1x10 3# 2x10 3# 2x10 3# 2x10 3# 2x10 3# 2x10 3#     Standing lat raises 1x10 3# 1x10 3# 1x10 3# 1x10 3# 1x10 3# 2x10 3# 2x10 3# 2x10 3#     Gait Training                                       Modalities                                                      "

## 2024-05-07 ENCOUNTER — OFFICE VISIT (OUTPATIENT)
Dept: PHYSICAL THERAPY | Facility: CLINIC | Age: 44
End: 2024-05-07
Payer: COMMERCIAL

## 2024-05-07 DIAGNOSIS — M54.12 CERVICAL RADICULOPATHY: Primary | ICD-10-CM

## 2024-05-07 PROCEDURE — 97530 THERAPEUTIC ACTIVITIES: CPT

## 2024-05-07 PROCEDURE — 97110 THERAPEUTIC EXERCISES: CPT

## 2024-05-07 PROCEDURE — 97112 NEUROMUSCULAR REEDUCATION: CPT

## 2024-05-07 NOTE — PROGRESS NOTES
"Daily Note     Today's date: 2024  Patient name: Ben Rivas  : 1980  MRN: [id number]  Referring provider: Amy Little*  Dx:   Encounter Diagnosis     ICD-10-CM    1. Cervical radiculopathy  M54.12                      Subjective: Pt reports L rotation with cervical flexion and extension, she reports feelings of heaviness on the L side of her head and around her eye       Objective: See treatment diary below      Assessment: Tolerated treatment well. Pt demonstrates good effort throughout session. Minimal curing given to facilitate proper exercise performance and technique. No change in sx with L lateral flexion + cervical retraction today. She reports less pain after session and feeling more relaxed. Patient demonstrated fatigue post treatment, exhibited good technique with therapeutic exercises, and would benefit from continued PT      Plan: Continue per plan of care.      Precautions: Hx of Diverticulitis, fibromyalgia     POC expires Unit limit Auth Expiration date PT/OT + Visit Limit?      BOMN                 Visit/Unit Tracking  AUTH Status:  Date             16 visits Used 3 4 3             Remaining  13 12 11              FOTO done         Manuals     Chin tuck ext 1'            SOR             L UT stretch 3x15\"     1x5 10\" 1x5 10\" B 1x5 10\" B 1x5 10\" B    L LS stretch      1x5 10\" 1x5 10\" B 1x5 10\" B 1x5 10\" B    Cerv traction c retraction 2'   3' 3' 3' 4' 4' 4'                 Neuro Re-Ed             Chin tucks 1x15 5\" 1x10 5\" 1x10 5\" 1x10 5\" 1x10 5\" 1x10 5\" 1x10 5\" 1x10 5\" 1x10 5\" + lat L flex    Chin tuck c ext 1x10 1x10 1x10 1x10 1x10 1x10 1x10 1x10 1x10    Deep neck flexion             TB rows/extensions X20 grn X20 ea grn  X20 ea grn  X20 ea blue X20 ea blue 20x5\" ea blue 20x5\" ea blue 20x5\" ea blue 20x5\" ea blue    No Moneys             Digiflex             Ther Ex             Pt education              Thoracic " "ext c FR; hands behind head X10 5\" X10 5\" on floor X10 5\" on floor     X10 5\" on floor X10 5\" on floor    Radial nerve glides 2-end tensioner 1x10 1x10 1x10 1x10 1x10        Median nerve glides 2-end tensioner 1x10 1x10 1x10 1x10 1x10        Cervical Rotation SNAGs c strap 1x5 5\"            Scalene stretch             UBE retro for posture             L sidebending isometric  1x5 5\" 1x10 5\" 1x10 5\" 1x10 5\" 1x15 5\"  1x15 5\" 1x15 5\"    Ther Activity             Standing Y raises             Standing front raises 1x10 3# 1x10 3# 1x10 3# 2x10 3# 2x10 3# 2x10 3# 2x10 3# 2x10 3# 2x10 3#    Standing lat raises 1x10 3# 1x10 3# 1x10 3# 1x10 3# 1x10 3# 2x10 3# 2x10 3# 2x10 3# 2x10 3#    Gait Training                                       Modalities                                                        "

## 2024-05-09 ENCOUNTER — OFFICE VISIT (OUTPATIENT)
Dept: PHYSICAL THERAPY | Facility: CLINIC | Age: 44
End: 2024-05-09
Payer: COMMERCIAL

## 2024-05-09 DIAGNOSIS — M54.12 CERVICAL RADICULOPATHY: Primary | ICD-10-CM

## 2024-05-09 PROCEDURE — 97112 NEUROMUSCULAR REEDUCATION: CPT

## 2024-05-09 PROCEDURE — 97530 THERAPEUTIC ACTIVITIES: CPT

## 2024-05-09 PROCEDURE — 97140 MANUAL THERAPY 1/> REGIONS: CPT

## 2024-05-09 NOTE — PROGRESS NOTES
"Daily Note     Today's date: 2024  Patient name: Ben Rivas  : 1980  MRN: [id number]  Referring provider: Amy Little*  Dx:   Encounter Diagnosis     ICD-10-CM    1. Cervical radiculopathy  M54.12           Start Time: 1545  Stop Time: 1630  Total time in clinic (min): 45 minutes    Subjective: Pt reports she had a little bit of a migraine today and her arm feels a little heavy.      Objective: See treatment diary below      Assessment: Tolerated treatment well. Patient demonstrated fatigue post treatment, exhibited good technique with therapeutic exercises, and would benefit from continued PT. Pt is able to progress through program with improved mobility and reduction in headache at end of session. Verbal cues provided to ensure proper form and technique with exercises to prevent muscle compensation.       Plan: Progress treatment as tolerated.       Precautions: Hx of Diverticulitis, fibromyalgia     POC expires Unit limit Auth Expiration date PT/OT + Visit Limit?      BOMN                 Visit/Unit Tracking  AUTH Status:  Date            16 visits Used 3 4 3 4            Remaining  13 12 11 10             FOTO done         Manuals     Chin tuck ext             SOR             L UT stretch      1x5 10\" 1x5 10\" B 1x5 10\" B 1x5 10\" B 1x5 10\" B   L LS stretch      1x5 10\" 1x5 10\" B 1x5 10\" B 1x5 10\" B 1x5 10\" B   Cerv traction c retraction    3' 3' 3' 4' 4' 4' 4'                Neuro Re-Ed             Chin tucks  1x10 5\" 1x10 5\" 1x10 5\" 1x10 5\" 1x10 5\" 1x10 5\" 1x10 5\" 1x10 5\" + lat L flex 1x10 5\"   Chin tuck c ext  1x10 1x10 1x10 1x10 1x10 1x10 1x10 1x10 1x10   Deep neck flexion             TB rows/extensions  X20 ea grn  X20 ea grn  X20 ea blue X20 ea blue 20x5\" ea blue 20x5\" ea blue 20x5\" ea blue 20x5\" ea blue 20x5\" ea blue   No Moneys             Digiflex             Ther Ex             Pt education              Thoracic " "ext c FR; hands behind head  X10 5\" on floor X10 5\" on floor     X10 5\" on floor X10 5\" on floor X10 5\" on floor   Radial nerve glides 2-end tensioner  1x10 1x10 1x10 1x10        Median nerve glides 2-end tensioner  1x10 1x10 1x10 1x10        Cervical Rotation SNAGs c strap             Scalene stretch             UBE retro for posture             L sidebending isometric  1x5 5\" 1x10 5\" 1x10 5\" 1x10 5\" 1x15 5\"  1x15 5\" 1x15 5\" 1x15 5\"   Ther Activity             Standing Y raises             Standing front raises  1x10 3# 1x10 3# 2x10 3# 2x10 3# 2x10 3# 2x10 3# 2x10 3# 2x10 3# 2x10 3#   Standing lat raises  1x10 3# 1x10 3# 1x10 3# 1x10 3# 2x10 3# 2x10 3# 2x10 3# 2x10 3# 2x10 3#   Gait Training                                       Modalities                                                          "

## 2024-05-14 ENCOUNTER — OFFICE VISIT (OUTPATIENT)
Dept: PHYSICAL THERAPY | Facility: CLINIC | Age: 44
End: 2024-05-14
Payer: COMMERCIAL

## 2024-05-14 DIAGNOSIS — M54.12 CERVICAL RADICULOPATHY: Primary | ICD-10-CM

## 2024-05-14 PROCEDURE — 97112 NEUROMUSCULAR REEDUCATION: CPT

## 2024-05-14 PROCEDURE — 97140 MANUAL THERAPY 1/> REGIONS: CPT

## 2024-05-14 PROCEDURE — 97110 THERAPEUTIC EXERCISES: CPT

## 2024-05-14 NOTE — PROGRESS NOTES
"Daily Note     Today's date: 2024  Patient name: Ben Rivas  : 1980  MRN: [id number]  Referring provider: Amy Little*  Dx:   Encounter Diagnosis     ICD-10-CM    1. Cervical radiculopathy  M54.12           Start Time: 1545  Stop Time: 1630  Total time in clinic (min): 45 minutes    Subjective: Pt reports her L flank pain is worse today.       Objective: See treatment diary below      Assessment: Tolerated treatment well. Patient demonstrated fatigue post treatment, exhibited good technique with therapeutic exercises, and would benefit from continued PT. Pt able to progress resistance for posture strengthening, had fatigue in hands from holding bands. Performed lumbar extensions due to anterior tightness/discomfort and pt had fatigue with this as well.       Plan: Continue per plan of care.  Progress treatment as tolerated.       Precautions: Hx of Diverticulitis, fibromyalgia     POC expires Unit limit Auth Expiration date PT/OT + Visit Limit?      BOMN                 Visit/Unit Tracking  AUTH Status:  Date           16 visits Used 3 4 3 4 5           Remaining  13 12 11 10 9            FOTO done         Manuals    Chin tuck ext             SOR             L UT stretch      1x5 10\" 1x5 10\" B 1x5 10\" B 1x5 10\" B 1x5 10\" B   L LS stretch      1x5 10\" 1x5 10\" B 1x5 10\" B 1x5 10\" B 1x5 10\" B   Cerv traction c retraction 8'   3' 3' 3' 4' 4' 4' 4'                Neuro Re-Ed             Chin tucks 1x10 5\"   1x10 5\" 1x10 5\" 1x10 5\" 1x10 5\" 1x10 5\" 1x10 5\" + lat L flex 1x10 5\"   Chin tuck c ext 1x10   1x10 1x10 1x10 1x10 1x10 1x10 1x10   Deep neck flexion             TB rows/extensions 20x5\" ea blk   X20 ea blue X20 ea blue 20x5\" ea blue 20x5\" ea blue 20x5\" ea blue 20x5\" ea blue 20x5\" ea blue   No Moneys             Ther Ex             Pt education  3'            Thoracic ext c FR; hands behind head        X10 5\" on floor X10 5\" " "on floor X10 5\" on floor   Radial nerve glides 2-end tensioner 1x10 ea   1x10 1x10        Median nerve glides 2-end tensioner 1x10 ea   1x10 1x10        Cervical Rotation SNAGs c strap             Scalene stretch             Standing lumbar extension 1x10            L sidebending isometric    1x10 5\" 1x10 5\" 1x15 5\"  1x15 5\" 1x15 5\" 1x15 5\"   Ther Activity             Standing Y raises             Standing front raises    2x10 3# 2x10 3# 2x10 3# 2x10 3# 2x10 3# 2x10 3# 2x10 3#   Standing lat raises    1x10 3# 1x10 3# 2x10 3# 2x10 3# 2x10 3# 2x10 3# 2x10 3#   Gait Training                                       Modalities                                                            "

## 2024-05-16 ENCOUNTER — APPOINTMENT (OUTPATIENT)
Dept: PHYSICAL THERAPY | Facility: CLINIC | Age: 44
End: 2024-05-16
Payer: COMMERCIAL

## 2024-05-16 DIAGNOSIS — M54.12 CERVICAL RADICULOPATHY: Primary | ICD-10-CM

## 2024-05-16 NOTE — PROGRESS NOTES
"Daily Note     Today's date: 2024  Patient name: Ben Rivas  : 1980  MRN: [id number]  Referring provider: Amy Little*  Dx:   Encounter Diagnosis     ICD-10-CM    1. Cervical radiculopathy  M54.12                      Subjective: ***      Objective: See treatment diary below      Assessment: Tolerated treatment {Tolerated treatment :[id number]}. Patient {assessment:}      Plan: {PLAN:[id number]}     Precautions: Hx of Diverticulitis, fibromyalgia     POC expires Unit limit Auth Expiration date PT/OT + Visit Limit?      BOMN                 Visit/Unit Tracking  AUTH Status:  Date          16 visits Used 3 4 3 4 5 6          Remaining  13 12 11 10 9 8           FOTO done         Manuals    Chin tuck ext             SOR             L UT stretch      1x5 10\" 1x5 10\" B 1x5 10\" B 1x5 10\" B 1x5 10\" B   L LS stretch      1x5 10\" 1x5 10\" B 1x5 10\" B 1x5 10\" B 1x5 10\" B   Cerv traction c retraction 8'   3' 3' 3' 4' 4' 4' 4'                Neuro Re-Ed             Chin tucks 1x10 5\"   1x10 5\" 1x10 5\" 1x10 5\" 1x10 5\" 1x10 5\" 1x10 5\" + lat L flex 1x10 5\"   Chin tuck c ext 1x10   1x10 1x10 1x10 1x10 1x10 1x10 1x10   Deep neck flexion             TB rows/extensions 20x5\" ea blk   X20 ea blue X20 ea blue 20x5\" ea blue 20x5\" ea blue 20x5\" ea blue 20x5\" ea blue 20x5\" ea blue   No Moneys             Ther Ex             Pt education  3'            Thoracic ext c FR; hands behind head        X10 5\" on floor X10 5\" on floor X10 5\" on floor   Radial nerve glides 2-end tensioner 1x10 ea   1x10 1x10        Median nerve glides 2-end tensioner 1x10 ea   1x10 1x10        Cervical Rotation SNAGs c strap             Scalene stretch             Standing lumbar extension 1x10            L sidebending isometric    1x10 5\" 1x10 5\" 1x15 5\"  1x15 5\" 1x15 5\" 1x15 5\"   Ther Activity             Standing Y raises             Standing front raises "    2x10 3# 2x10 3# 2x10 3# 2x10 3# 2x10 3# 2x10 3# 2x10 3#   Standing lat raises    1x10 3# 1x10 3# 2x10 3# 2x10 3# 2x10 3# 2x10 3# 2x10 3#   Gait Training                                       Modalities

## 2024-05-21 ENCOUNTER — OFFICE VISIT (OUTPATIENT)
Dept: PHYSICAL THERAPY | Facility: CLINIC | Age: 44
End: 2024-05-21
Payer: COMMERCIAL

## 2024-05-21 DIAGNOSIS — M54.12 CERVICAL RADICULOPATHY: Primary | ICD-10-CM

## 2024-05-21 PROCEDURE — 97112 NEUROMUSCULAR REEDUCATION: CPT

## 2024-05-21 PROCEDURE — 97530 THERAPEUTIC ACTIVITIES: CPT

## 2024-05-21 PROCEDURE — 97110 THERAPEUTIC EXERCISES: CPT

## 2024-05-21 NOTE — PROGRESS NOTES
"Daily Note     Today's date: 2024  Patient name: Ben Rivas  : 1980  MRN: [id number]  Referring provider: Amy Little*  Dx:   Encounter Diagnosis     ICD-10-CM    1. Cervical radiculopathy  M54.12           Start Time: 1545  Stop Time: 1630  Total time in clinic (min): 45 minutes    Subjective: Pt reports continuing to have difficulty with turning her head to the L and feels facial weakness.      Objective: See treatment diary below      Assessment: Tolerated treatment fair. Patient demonstrated fatigue post treatment, exhibited good technique with therapeutic exercises, and would benefit from continued PT. Encouraged again neurologist f/u due to subjective report. Pt has about 10-15 degrees of passive cervical rotation to L that she reports difficulty performing actively. Therefore progressed strengthening of cervical musculature in order to better restore AROM.      Plan: Continue per plan of care.  Progress treatment as tolerated.       Precautions: Hx of Diverticulitis, fibromyalgia     POC expires Unit limit Auth Expiration date PT/OT + Visit Limit?      BOMN                 Visit/Unit Tracking  AUTH Status:  Date          16 visits Used 3 4 3 4 5 6          Remaining  13 12 11 10 9 8           FOTO done     Access Code [email or web address]     Manuals    Chin tuck ext             SOR             L UT stretch      1x5 10\" 1x5 10\" B 1x5 10\" B 1x5 10\" B 1x5 10\" B   L LS stretch      1x5 10\" 1x5 10\" B 1x5 10\" B 1x5 10\" B 1x5 10\" B   Cerv traction c retraction 8' 6'  3' 3' 3' 4' 4' 4' 4'                Neuro Re-Ed             Chin tucks 1x10 5\" 1x10 5\"  1x10 5\" 1x10 5\" 1x10 5\" 1x10 5\" 1x10 5\" 1x10 5\" + lat L flex 1x10 5\"   Chin tuck c ext 1x10 1x10  1x10 1x10 1x10 1x10 1x10 1x10 1x10   Deep neck flexion             TB rows/extensions 20x5\" ea blk   X20 ea blue X20 ea blue 20x5\" ea blue 20x5\" ea blue 20x5\" ea blue 20x5\" ea " "blue 20x5\" ea blue   No Moneys             Ther Ex             Pt education  3' 6' HEP           Thoracic ext c FR; hands behind head        X10 5\" on floor X10 5\" on floor X10 5\" on floor   Radial nerve glides 2-end tensioner 1x10 ea 1x10 ea  1x10 1x10        Median nerve glides 2-end tensioner 1x10 ea 1x10 ea  1x10 1x10        Cervical Rotation SNAGs c strap             Scalene stretch             Standing lumbar extension 1x10 1x10           L sidebending isometric  1x10 5\"  1x10 5\" 1x10 5\" 1x15 5\"  1x15 5\" 1x15 5\" 1x15 5\"   Ther Activity             Standing Y raises             Standing front raises  2x10 3#  2x10 3# 2x10 3# 2x10 3# 2x10 3# 2x10 3# 2x10 3# 2x10 3#   Standing lat raises  2x10 3#  1x10 3# 1x10 3# 2x10 3# 2x10 3# 2x10 3# 2x10 3# 2x10 3#   Gait Training                                       Modalities                                                                "

## 2024-05-23 ENCOUNTER — OFFICE VISIT (OUTPATIENT)
Dept: PHYSICAL THERAPY | Facility: CLINIC | Age: 44
End: 2024-05-23
Payer: COMMERCIAL

## 2024-05-23 ENCOUNTER — TELEPHONE (OUTPATIENT)
Age: 44
End: 2024-05-23

## 2024-05-23 DIAGNOSIS — M54.12 CERVICAL RADICULOPATHY: Primary | ICD-10-CM

## 2024-05-23 PROCEDURE — 97112 NEUROMUSCULAR REEDUCATION: CPT

## 2024-05-23 PROCEDURE — 97530 THERAPEUTIC ACTIVITIES: CPT

## 2024-05-23 PROCEDURE — 97110 THERAPEUTIC EXERCISES: CPT

## 2024-05-23 NOTE — TELEPHONE ENCOUNTER
Called patient left voicemail re: f/u appointment, left call back number to call center to schedule.

## 2024-05-23 NOTE — PROGRESS NOTES
"Daily Note     Today's date: 2024  Patient name: Ben Rivas  : 1980  MRN: [id number]  Referring provider: Amy Little*  Dx:   Encounter Diagnosis     ICD-10-CM    1. Cervical radiculopathy  M54.12           Start Time: 1545  Stop Time: 1630  Total time in clinic (min): 45 minutes    Subjective: Pt reports feeling a little better with turning her head to the L side.      Objective: See treatment diary below      Assessment: Tolerated treatment well. Patient demonstrated fatigue post treatment, exhibited good technique with therapeutic exercises, and would benefit from continued PT. Pt doing well with exercises this session, continued with isometrics as pt appears to respond well to these with improved ROM after performance. Pt has significant muscle fatigue at end of session.      Plan: Continue per plan of care.      Precautions: Hx of Diverticulitis, fibromyalgia     POC expires Unit limit Auth Expiration date PT/OT + Visit Limit?      BOMN                 Visit/Unit Tracking  AUTH Status:  Date          16 visits Used 3 4 3 4 5 6          Remaining  13 12 11 10 9 8           FOTO done     Access Code [email or web address]     Manuals    Chin tuck ext             SOR             L UT stretch      1x5 10\" 1x5 10\" B 1x5 10\" B 1x5 10\" B 1x5 10\" B   L LS stretch      1x5 10\" 1x5 10\" B 1x5 10\" B 1x5 10\" B 1x5 10\" B   Cerv traction c retraction 8' 6' 8'   3' 4' 4' 4' 4'                Neuro Re-Ed             Chin tucks 1x10 5\" 1x10 5\" 1x10 5\"   1x10 5\" 1x10 5\" 1x10 5\" 1x10 5\" + lat L flex 1x10 5\"   Chin tuck c ext 1x10 1x10 1x10   1x10 1x10 1x10 1x10 1x10   Deep neck flexion             TB rows/extensions 20x5\" ea blk  20x5\" ea blk   20x5\" ea blue 20x5\" ea blue 20x5\" ea blue 20x5\" ea blue 20x5\" ea blue   No Moneys             Ther Ex             Pt education  3' 6' HEP 3'          Thoracic ext c FR; hands behind head        X10 5\" on " "floor X10 5\" on floor X10 5\" on floor   Radial nerve glides 2-end tensioner 1x10 ea 1x10 ea 1x10 ea          Median nerve glides 2-end tensioner 1x10 ea 1x10 ea 1x10 ea          Cervical Rotation SNAGs c strap             Scalene stretch             Standing lumbar extension 1x10 1x10 1x10          L sidebending isometric  1x10 5\" 1x10 5\"   1x15 5\"  1x15 5\" 1x15 5\" 1x15 5\"   L cerv rotation iso   1x10 5\"          Ther Activity             Standing Y raises   2x10 3#          Standing front raises  2x10 3#    2x10 3# 2x10 3# 2x10 3# 2x10 3# 2x10 3#   Standing lat raises  2x10 3#    2x10 3# 2x10 3# 2x10 3# 2x10 3# 2x10 3#   Gait Training                                       Modalities                                                                  "

## 2024-05-28 ENCOUNTER — OFFICE VISIT (OUTPATIENT)
Dept: PHYSICAL THERAPY | Facility: CLINIC | Age: 44
End: 2024-05-28
Payer: COMMERCIAL

## 2024-05-28 ENCOUNTER — TELEPHONE (OUTPATIENT)
Age: 44
End: 2024-05-28

## 2024-05-28 DIAGNOSIS — M54.12 CERVICAL RADICULOPATHY: Primary | ICD-10-CM

## 2024-05-28 PROCEDURE — 97112 NEUROMUSCULAR REEDUCATION: CPT

## 2024-05-28 PROCEDURE — 97530 THERAPEUTIC ACTIVITIES: CPT

## 2024-05-28 PROCEDURE — 97110 THERAPEUTIC EXERCISES: CPT

## 2024-05-28 NOTE — TELEPHONE ENCOUNTER
Called the patient and LMT[employer] about a form being sent on her behalf from the [employer]

## 2024-05-28 NOTE — PROGRESS NOTES
"Daily Note     Today's date: 2024  Patient name: Ben Rivas  : 1980  MRN: [id number]  Referring provider: Amy Little*  Dx:   Encounter Diagnosis     ICD-10-CM    1. Cervical radiculopathy  M54.12                      Subjective: Pt reports she's tired today but doesn't hurt too bad today.       Objective: See treatment diary below      Assessment: Tolerated treatment well. She tolerates strengthening exercises well without increase in sx. Good stretching reported with manual therapy. Pt demonstrates good effort throughout session. Minimal curing given to facilitate proper exercise performance and technique.  Patient demonstrated fatigue post treatment, exhibited good technique with therapeutic exercises, and would benefit from continued PT      Plan: Continue per plan of care.      Precautions: Hx of Diverticulitis, fibromyalgia     POC expires Unit limit Auth Expiration date PT/OT + Visit Limit?      BOMN                 Visit/Unit Tracking  AUTH Status:  Date         16 visits Used 3 4 5 6 7 8 9         Remaining  13 12 11 10 9 8 7          FOTO done     Access Code [email or web address]     Manuals    Chin tuck ext             SOR             L UT stretch      1x5 10\" 1x5 10\" B 1x5 10\" B 1x5 10\" B 1x5 10\" B   L LS stretch      1x5 10\" 1x5 10\" B 1x5 10\" B 1x5 10\" B 1x5 10\" B   Cerv traction c retraction 8' 6' 8' 6'  3' 4' 4' 4' 4'                Neuro Re-Ed             Chin tucks 1x10 5\" 1x10 5\" 1x10 5\" 1x10 5\"  1x10 5\" 1x10 5\" 1x10 5\" 1x10 5\" + lat L flex 1x10 5\"   Chin tuck c ext 1x10 1x10 1x10 1x10  1x10 1x10 1x10 1x10 1x10   Deep neck flexion             TB rows/extensions 20x5\" ea blk  20x5\" ea blk 20x5\" ea blk  20x5\" ea blue 20x5\" ea blue 20x5\" ea blue 20x5\" ea blue 20x5\" torrie blue   No Moneys             Ther Ex             Pt education  3' 6' HEP 3' 3'         Thoracic ext c FR; hands behind head        X10 5\" " "on floor X10 5\" on floor X10 5\" on floor   Radial nerve glides 2-end tensioner 1x10 ea 1x10 ea 1x10 ea 1x10 ea         Median nerve glides 2-end tensioner 1x10 ea 1x10 ea 1x10 ea 1x10 ea         Cervical Rotation SNAGs c strap             Scalene stretch             Standing lumbar extension 1x10 1x10 1x10 1x10         L sidebending isometric  1x10 5\" 1x10 5\" 1x10 5\"  1x15 5\"  1x15 5\" 1x15 5\" 1x15 5\"   L cerv rotation iso   1x10 5\" 1x10 5\"         Ther Activity             Standing Y raises   2x10 3# 2x10 3#         Standing front raises  2x10 3#    2x10 3# 2x10 3# 2x10 3# 2x10 3# 2x10 3#   Standing lat raises  2x10 3#    2x10 3# 2x10 3# 2x10 3# 2x10 3# 2x10 3#   Gait Training                                       Modalities                                                                    "

## 2024-05-30 ENCOUNTER — OFFICE VISIT (OUTPATIENT)
Dept: PHYSICAL THERAPY | Facility: CLINIC | Age: 44
End: 2024-05-30
Payer: COMMERCIAL

## 2024-05-30 DIAGNOSIS — M54.12 CERVICAL RADICULOPATHY: Primary | ICD-10-CM

## 2024-05-30 PROCEDURE — 97112 NEUROMUSCULAR REEDUCATION: CPT

## 2024-05-30 PROCEDURE — 97110 THERAPEUTIC EXERCISES: CPT

## 2024-05-30 PROCEDURE — 97530 THERAPEUTIC ACTIVITIES: CPT

## 2024-05-30 NOTE — PROGRESS NOTES
"Daily Note     Today's date: 2024  Patient name: Ben Rivas  : 1980  MRN: [id number]  Referring provider: Amy Little*  Dx:   Encounter Diagnosis     ICD-10-CM    1. Cervical radiculopathy  M54.12           Start Time: 1545  Stop Time: 1630  Total time in clinic (min): 45 minutes    Subjective: Pt reports no new complaints today.        Objective: See treatment diary below      Assessment: Tolerated treatment well. Pt demonstrates good effort throughout session. Minimal curing given to facilitate proper exercise performance and technique. She tolerates UE strengthening exercises well without complaints of pain, only fatigue.  Less discomfort noted after manual traction. Patient demonstrated fatigue post treatment, exhibited good technique with therapeutic exercises, and would benefit from continued PT      Plan: Continue per plan of care.      Precautions: Hx of Diverticulitis, fibromyalgia     POC expires Unit limit Auth Expiration date PT/OT + Visit Limit?   24 BOMN                 Visit/Unit Tracking  AUTH Status:  Date        16 visits Used 3 4 5 6 7 8 9 10        Remaining  13 12 11 10 9 8 7 6         FOTO done     Access Code [email or web address]     Manuals    Chin tuck ext             SOR             L UT stretch        1x5 10\" B 1x5 10\" B 1x5 10\" B   L LS stretch        1x5 10\" B 1x5 10\" B 1x5 10\" B   Cerv traction c retraction 8' 6' 8' 6' 6'   4' 4' 4'                Neuro Re-Ed             Chin tucks 1x10 5\" 1x10 5\" 1x10 5\" 1x10 5\" 1x10 5\"   1x10 5\" 1x10 5\" + lat L flex 1x10 5\"   Chin tuck c ext 1x10 1x10 1x10 1x10 1x10   1x10 1x10 1x10   Deep neck flexion             TB rows/extensions 20x5\" ea blk  20x5\" ea blk 20x5\" ea blk 20x5\" ea blk   20x5\" ea blue 20x5\" ea blue 20x5\" ea blue   No Moneys             Ther Ex             Pt education  3' 6' HEP 3' 3' 3'        Thoracic ext c FR; hands behind head  " "      X10 5\" on floor X10 5\" on floor X10 5\" on floor   Radial nerve glides 2-end tensioner 1x10 ea 1x10 ea 1x10 ea 1x10 ea 1x10 ea        Median nerve glides 2-end tensioner 1x10 ea 1x10 ea 1x10 ea 1x10 ea 1x10 ea        Cervical Rotation SNAGs c strap             Scalene stretch             Standing lumbar extension 1x10 1x10 1x10 1x10 1x10        L sidebending isometric  1x10 5\" 1x10 5\" 1x10 5\" 1x10 5\"   1x15 5\" 1x15 5\" 1x15 5\"   L cerv rotation iso   1x10 5\" 1x10 5\" 1x10 5\"        Ther Activity             Standing Y raises   2x10 3# 2x10 3# 2x10 3#        Standing front raises  2x10 3#      2x10 3# 2x10 3# 2x10 3#   Standing lat raises  2x10 3#   2x10 3#   2x10 3# 2x10 3# 2x10 3#   Gait Training                                       Modalities                                                                      "

## 2024-06-04 ENCOUNTER — OFFICE VISIT (OUTPATIENT)
Dept: PHYSICAL THERAPY | Facility: CLINIC | Age: 44
End: 2024-06-04
Payer: COMMERCIAL

## 2024-06-04 DIAGNOSIS — M54.12 CERVICAL RADICULOPATHY: Primary | ICD-10-CM

## 2024-06-04 PROCEDURE — 97530 THERAPEUTIC ACTIVITIES: CPT

## 2024-06-04 PROCEDURE — 97110 THERAPEUTIC EXERCISES: CPT

## 2024-06-04 PROCEDURE — 97112 NEUROMUSCULAR REEDUCATION: CPT

## 2024-06-04 NOTE — PROGRESS NOTES
"Daily Note     Today's date: 2024  Patient name: Ben Rivas  : 1980  MRN: [id number]  Referring provider: Amy Little*  Dx:   Encounter Diagnosis     ICD-10-CM    1. Cervical radiculopathy  M54.12           Start Time: 1230  Stop Time: 1314  Total time in clinic (min): 44 minutes    Subjective: pt reports feeling pretty good today, not as much pain.       Objective: See treatment diary below      Assessment: Tolerated treatment well. Pt demonstrates good effort throughout session. Minimal curing given to facilitate proper exercise performance and technique. She reports no increase in sx with exercises today. Patient demonstrated fatigue post treatment, exhibited good technique with therapeutic exercises, and would benefit from continued PT      Plan: Continue per plan of care.      Precautions: Hx of Diverticulitis, fibromyalgia     POC expires Unit limit Auth Expiration date PT/OT + Visit Limit?   24 BOMN                 Visit/Unit Tracking  AUTH Status:  Date       16 visits Used 3 4 5 6 7 8 9 10 11       Remaining  13 12 11 10 9 8 7 6 5        FOTO done     Access Code [email or web address]     Manuals    Chin tuck ext             SOR             L UT stretch        1x5 10\" B 1x5 10\" B 1x5 10\" B   L LS stretch        1x5 10\" B 1x5 10\" B 1x5 10\" B   Cerv traction c retraction 8' 6' 8' 6' 6' 6'  4' 4' 4'                Neuro Re-Ed             Chin tucks 1x10 5\" 1x10 5\" 1x10 5\" 1x10 5\" 1x10 5\" 1x10 5\"  1x10 5\" 1x10 5\" + lat L flex 1x10 5\"   Chin tuck c ext 1x10 1x10 1x10 1x10 1x10 1x10  1x10 1x10 1x10   Deep neck flexion             TB rows/extensions 20x5\" ea blk  20x5\" ea blk 20x5\" ea blk 20x5\" ea blk 20x5\" ea blk  20x5\" ea blue 20x5\" ea blue 20x5\" ea blue   No Moneys             Ther Ex             Pt education  3' 6' HEP 3' 3' 3' 3'       Thoracic ext c FR; hands behind head        X10 5\" on floor X10 " "5\" on floor X10 5\" on floor   Radial nerve glides 2-end tensioner 1x10 ea 1x10 ea 1x10 ea 1x10 ea 1x10 ea 1x10 ea       Median nerve glides 2-end tensioner 1x10 ea 1x10 ea 1x10 ea 1x10 ea 1x10 ea 1x10 ea       Cervical Rotation SNAGs c strap             Scalene stretch             Standing lumbar extension 1x10 1x10 1x10 1x10 1x10 1x10       L sidebending isometric  1x10 5\" 1x10 5\" 1x10 5\" 1x10 5\" 1x10 5\"  1x15 5\" 1x15 5\" 1x15 5\"   L cerv rotation iso   1x10 5\" 1x10 5\" 1x10 5\" 1x10 5\"       Ther Activity             Standing Y raises   2x10 3# 2x10 3# 2x10 3#        Standing front raises  2x10 3#      2x10 3# 2x10 3# 2x10 3#   Standing lat raises  2x10 3#   2x10 3#   2x10 3# 2x10 3# 2x10 3#   Gait Training                                       Modalities                                                                        "

## 2024-06-06 ENCOUNTER — OFFICE VISIT (OUTPATIENT)
Dept: PHYSICAL THERAPY | Facility: CLINIC | Age: 44
End: 2024-06-06
Payer: COMMERCIAL

## 2024-06-06 DIAGNOSIS — M54.12 CERVICAL RADICULOPATHY: Primary | ICD-10-CM

## 2024-06-06 PROCEDURE — 97112 NEUROMUSCULAR REEDUCATION: CPT

## 2024-06-06 PROCEDURE — 97110 THERAPEUTIC EXERCISES: CPT

## 2024-06-06 PROCEDURE — 97530 THERAPEUTIC ACTIVITIES: CPT

## 2024-06-06 NOTE — PROGRESS NOTES
"Daily Note     Today's date: 2024  Patient name: Ben Rivas  : 1980  MRN: [id number]  Referring provider: Amy Little*  Dx:   Encounter Diagnosis     ICD-10-CM    1. Cervical radiculopathy  M54.12           Start Time: 1630  Stop Time: 1715  Total time in clinic (min): 45 minutes    Subjective: Pt reports she has less pain today.       Objective: See treatment diary below      Assessment: Tolerated treatment well. Pt demonstrates good effort throughout session. Minimal curing given to facilitate proper exercise performance and technique. She tolerates all exercises without increase in sx. She reports increase in wrist tightness today. Patient demonstrated fatigue post treatment, exhibited good technique with therapeutic exercises, and would benefit from continued PT      Plan: Continue per plan of care.      Precautions: Hx of Diverticulitis, fibromyalgia     POC expires Unit limit Auth Expiration date PT/OT + Visit Limit?   24 BOMN                 Visit/Unit Tracking  AUTH Status:  Date      16 visits Used 3 4 5 6 7 8 9 10 11 12      Remaining  13 12 11 10 9 8 7 6 5 4       FOTO done     Access Code [email or web address]     Manuals    Chin tuck ext             SOR             L UT stretch         1x5 10\" B 1x5 10\" B   L LS stretch         1x5 10\" B 1x5 10\" B   Cerv traction c retraction 8' 6' 8' 6' 6' 6' 6'  4' 4'                Neuro Re-Ed             Chin tucks 1x10 5\" 1x10 5\" 1x10 5\" 1x10 5\" 1x10 5\" 1x10 5\" 1x10 5\"  1x10 5\" + lat L flex 1x10 5\"   Chin tuck c ext 1x10 1x10 1x10 1x10 1x10 1x10 1x10  1x10 1x10   Deep neck flexion             TB rows/extensions 20x5\" ea blk  20x5\" ea blk 20x5\" ea blk 20x5\" ea blk 20x5\" ea blk 20x5\" ea blk  20x5\" ea blue 20x5\" ea blue   No Moneys             Ther Ex             Pt education  3' 6' HEP 3' 3' 3' 3' 3'      Thoracic ext c FR; hands behind head         X10 " "5\" on floor X10 5\" on floor   Radial nerve glides 2-end tensioner 1x10 ea 1x10 ea 1x10 ea 1x10 ea 1x10 ea 1x10 ea 1x10 ea      Median nerve glides 2-end tensioner 1x10 ea 1x10 ea 1x10 ea 1x10 ea 1x10 ea 1x10 ea 1x10 ea      Cervical Rotation SNAGs c strap             Scalene stretch             Standing lumbar extension 1x10 1x10 1x10 1x10 1x10 1x10 1x10      L sidebending isometric  1x10 5\" 1x10 5\" 1x10 5\" 1x10 5\" 1x10 5\" 1x10 5\"  1x15 5\" 1x15 5\"   L cerv rotation iso   1x10 5\" 1x10 5\" 1x10 5\" 1x10 5\" 1x10 5\"      Ther Activity             Standing Y raises   2x10 3# 2x10 3# 2x10 3#  2x10 3#      Standing front raises  2x10 3#       2x10 3# 2x10 3#   Standing lat raises  2x10 3#   2x10 3#  2x10 3#  2x10 3# 2x10 3#   Gait Training                                       Modalities                                                                          "

## 2024-06-11 ENCOUNTER — OFFICE VISIT (OUTPATIENT)
Dept: PHYSICAL THERAPY | Facility: CLINIC | Age: 44
End: 2024-06-11
Payer: COMMERCIAL

## 2024-06-11 DIAGNOSIS — M54.12 CERVICAL RADICULOPATHY: Primary | ICD-10-CM

## 2024-06-11 PROCEDURE — 97140 MANUAL THERAPY 1/> REGIONS: CPT

## 2024-06-11 PROCEDURE — 97110 THERAPEUTIC EXERCISES: CPT

## 2024-06-11 PROCEDURE — 97112 NEUROMUSCULAR REEDUCATION: CPT

## 2024-06-11 NOTE — PROGRESS NOTES
"Daily Note     Today's date: 2024  Patient name: Ben Rivas  : 1980  MRN: [id number]  Referring provider: Amy Little*  Dx:   Encounter Diagnosis     ICD-10-CM    1. Cervical radiculopathy  M54.12           Start Time: 1545  Stop Time: 1630  Total time in clinic (min): 45 minutes    Subjective: Pt reports she did have a headache yesterday, feels it is related to her laying down.      Objective: See treatment diary below      Assessment: Tolerated treatment well. Patient demonstrated fatigue post treatment, exhibited good technique with therapeutic exercises, and would benefit from continued PT. Added in resisted theraband rotations due to difficulty with L-sided turning. Pt had some radiating discomfort near her L triceps, some discomfort relieved by doing soft tissue massage to area but still some lingering discomfort at end of session.      Plan: Continue per plan of care.  Progress treatment as tolerated.       Precautions: Hx of Diverticulitis, fibromyalgia     POC expires Unit limit Auth Expiration date PT/OT + Visit Limit?   24 BOMN                 Visit/Unit Tracking  AUTH Status:  Date     16 visits Used 3 4 5 6 7 8 9 10 11 12 14     Remaining  13 12 11 10 9 8 7 6 5 4 2      FOTO done     Access Code [email or web address]     Manuals      Chin tuck ext             STM L scap/triceps region        5'     L UT stretch             L LS stretch             Cerv traction c retraction 8' 6' 8' 6' 6' 6' 6' 6'                  Neuro Re-Ed             Chin tucks 1x10 5\" 1x10 5\" 1x10 5\" 1x10 5\" 1x10 5\" 1x10 5\" 1x10 5\" 1x10 5\"     Chin tuck c ext 1x10 1x10 1x10 1x10 1x10 1x10 1x10 1x10     Deep neck flexion             TB rows/extensions 20x5\" ea blk  20x5\" ea blk 20x5\" ea blk 20x5\" ea blk 20x5\" ea blk 20x5\" ea blk 20x5\" ea blk     No Moneys             TB rotations        X10 ea red     Ther Ex          " "   Pt education  3' 6' HEP 3' 3' 3' 3' 3' 2'     Thoracic ext c FR; hands behind head             Radial nerve glides 2-end tensioner 1x10 ea 1x10 ea 1x10 ea 1x10 ea 1x10 ea 1x10 ea 1x10 ea      Median nerve glides 2-end tensioner 1x10 ea 1x10 ea 1x10 ea 1x10 ea 1x10 ea 1x10 ea 1x10 ea 1x10 ea     Cervical Rotation SNAGs c strap             Scalene stretch             Standing lumbar extension 1x10 1x10 1x10 1x10 1x10 1x10 1x10      L sidebending isometric  1x10 5\" 1x10 5\" 1x10 5\" 1x10 5\" 1x10 5\" 1x10 5\" 1x10 5\"     L cerv rotation iso   1x10 5\" 1x10 5\" 1x10 5\" 1x10 5\" 1x10 5\" 1x10 5\"     Doorway stretch        10x10\" L     Ther Activity             Standing Y raises   2x10 3# 2x10 3# 2x10 3#  2x10 3#      Standing front raises  2x10 3#           Standing lat raises  2x10 3#   2x10 3#  2x10 3#      Gait Training                                       Modalities                                                                            "

## 2024-06-13 ENCOUNTER — OFFICE VISIT (OUTPATIENT)
Dept: PHYSICAL THERAPY | Facility: CLINIC | Age: 44
End: 2024-06-13
Payer: COMMERCIAL

## 2024-06-13 DIAGNOSIS — M54.12 CERVICAL RADICULOPATHY: Primary | ICD-10-CM

## 2024-06-13 PROCEDURE — 97140 MANUAL THERAPY 1/> REGIONS: CPT

## 2024-06-13 PROCEDURE — 97110 THERAPEUTIC EXERCISES: CPT

## 2024-06-13 PROCEDURE — 97112 NEUROMUSCULAR REEDUCATION: CPT

## 2024-06-13 NOTE — PROGRESS NOTES
"Daily Note     Today's date: 2024  Patient name: Ben Rivas  : 1980  MRN: [id number]  Referring provider: Amy Little*  Dx:   Encounter Diagnosis     ICD-10-CM    1. Cervical radiculopathy  M54.12           Start Time: 1545  Stop Time: 1630  Total time in clinic (min): 45 minutes    Subjective: Pt reports she felt better after last session.      Objective: See treatment diary below      Assessment: Tolerated treatment well. Patient demonstrated fatigue post treatment, exhibited good technique with therapeutic exercises, and would benefit from continued PT. Pt did get some onset of posterior arm pain during exercises, though most of it appears bilateral and symmetrical in nature, most likely component of muscular fatigue. Pt did however respond well to soft tissue massage and trigger point release to area and reports feeling better after.      Plan:  Assess after neuro f/u     Precautions: Hx of Diverticulitis, fibromyalgia     POC expires Unit limit Auth Expiration date PT/OT + Visit Limit?   24 BOMN                 Visit/Unit Tracking  AUTH Status:  Date    16 visits Used 3 4 5 6 7 8 9 10 11 12 14 15    Remaining  13 12 11 10 9 8 7 6 5 4 2 1     FOTO done     Access Code [email or web address]     Manuals     Chin tuck ext             TPR L scap/triceps region        5' 6'    L UT stretch             L LS stretch             Cerv traction c retraction 8' 6' 8' 6' 6' 6' 6' 6'     L radial/median nerve glides         X20 ea    Neuro Re-Ed             Chin tucks 1x10 5\" 1x10 5\" 1x10 5\" 1x10 5\" 1x10 5\" 1x10 5\" 1x10 5\" 1x10 5\" 1x10 5\"    Chin tuck c ext 1x10 1x10 1x10 1x10 1x10 1x10 1x10 1x10 1x10    Deep neck flexion             TB rows/extensions 20x5\" ea blk  20x5\" ea blk 20x5\" ea blk 20x5\" ea blk 20x5\" ea blk 20x5\" ea blk 20x5\" ea blk 20x5\" ea blk    No Moneys             TB rotations        " "X10 ea red X10 ea red    Ther Ex             Pt education  3' 6' HEP 3' 3' 3' 3' 3' 2' 6'    Thoracic ext c FR; hands behind head             Radial nerve glides 2-end tensioner 1x10 ea 1x10 ea 1x10 ea 1x10 ea 1x10 ea 1x10 ea 1x10 ea      Median nerve glides 2-end tensioner 1x10 ea 1x10 ea 1x10 ea 1x10 ea 1x10 ea 1x10 ea 1x10 ea 1x10 ea     Cervical Rotation SNAGs c strap             Scalene stretch             Standing lumbar extension 1x10 1x10 1x10 1x10 1x10 1x10 1x10      L sidebending isometric  1x10 5\" 1x10 5\" 1x10 5\" 1x10 5\" 1x10 5\" 1x10 5\" 1x10 5\" 1x10 5\"    L cerv rotation iso   1x10 5\" 1x10 5\" 1x10 5\" 1x10 5\" 1x10 5\" 1x10 5\" 1x10 5\"    Doorway stretch        10x10\" L 10x10\" B    Ther Activity             Standing Y raises   2x10 3# 2x10 3# 2x10 3#  2x10 3#      Standing front raises  2x10 3#           Standing lat raises  2x10 3#   2x10 3#  2x10 3#      Gait Training                                       Modalities                                                                              "

## 2024-06-19 ENCOUNTER — OFFICE VISIT (OUTPATIENT)
Age: 44
End: 2024-06-19
Payer: COMMERCIAL

## 2024-06-19 ENCOUNTER — TELEPHONE (OUTPATIENT)
Dept: NEUROLOGY | Facility: CLINIC | Age: 44
End: 2024-06-19

## 2024-06-19 VITALS
SYSTOLIC BLOOD PRESSURE: 100 MMHG | HEIGHT: 64 IN | HEART RATE: 84 BPM | DIASTOLIC BLOOD PRESSURE: 66 MMHG | BODY MASS INDEX: 21.34 KG/M2 | WEIGHT: 125 LBS | OXYGEN SATURATION: 98 %

## 2024-06-19 DIAGNOSIS — N94.6 SEVERE MENSTRUAL CRAMPS: ICD-10-CM

## 2024-06-19 DIAGNOSIS — R29.898 WEAKNESS OF LEFT LEG: Primary | ICD-10-CM

## 2024-06-19 PROCEDURE — 99215 OFFICE O/P EST HI 40 MIN: CPT | Performed by: PSYCHIATRY & NEUROLOGY

## 2024-06-19 RX ORDER — LIDOCAINE HYDROCHLORIDE 20 MG/ML
JELLY TOPICAL AS NEEDED
Qty: 2 ML | Refills: 2 | Status: SHIPPED | OUTPATIENT
Start: 2024-06-19

## 2024-06-19 NOTE — PATIENT INSTRUCTIONS
Topamax/Topiramate is an oral pill which is used for migraine headaches: We typical start it at 25 mg and then increase it slowly over the course of several weeks. Can cause following side effects including numbness, tingling, weight loss, no recommended if you are trying to get pregnant, specific kidney stone issues.     Elavil/Pamelor is an oral pill. It is used for migraine and tension type headaches. We typical start it at a low dose of 10 mg and increase it to 20 mg. Common side effects include Fatigue-Dry mouth-Weight gain-Constipation- Drowsiness. It can also help with mood.     Propranolol/Beta Blockers is an oral pill.  It is started around 40 mg every day and increased accordingly. Common side effects not used if you have underlying asthma. Can cause low pulse and blood pressure.     Depakote is a pill which is used for migraine headaches and seizures. Usually once or twice a day. Common side effects include the following Nausea--Tiredness--Tremor--Dizziness--Weight gain--Hair loss--Birth defects    QULIPTA is an oral prescription medicine used for the preventive treatment of migraine in adults-Common side effects include, allergic reaction. If you have any kidney problems, liver problems, or plan to get pregnant than it is not recommended.       Ajovy is an injection, which is given once a month under you skin: AJOVY may cause allergic reactions, including itching, rash, and hives that can happen within hours and up to 1 month after receiving AJOVY. Call your healthcare provider or get emergency medical help right away if you have any symptoms of an allergic reaction: swelling of your face, mouth, tongue, throat, or if you have trouble breathing. Talk to your doctor about stopping AJOVY if you have an allergic reaction.    Aimovig is an injection, which is given once a month under you skin. The common side effects include Allergic reactions, including rash or swelling can happen after receiving Aimovig®.  This can happen within hours to days after using Aimovig®. Call your HCP or get emergency medical help right away if you have any of the following symptoms of an allergic reaction: swelling of the face, mouth, tongue or throat, or trouble breathing. Constipation with serious complications. Severe constipation can happen after receiving Aimovig®. In some cases people have been hospitalized or needed surgery. Contact your HCP if you have severe constipation or constipation associated with symptoms such as severe or constant belly pain, vomiting, swelling of belly or bloating.High blood pressure. High blood pressure or worsening of high blood pressure can happen after receiving Aimovig®. Contact your healthcare provider if you have an increase in blood pressure.    Emgality is an injection, which is given once a month under you skin. The initial does is 2 injections and then it is one injection a month there after. Common side effects include allergic reactions, such as itching, rash, hives, and trouble breathing. Allergic reactions can happen days after using Emgality. Call your healthcare provider or get emergency medical help right away if you have any of the following symptoms, which may be part of an allergic reaction: swelling of your face, mouth, tongue, or throat, or trouble breathing. Common side effects--The most common side effects of Emgality are injection site reactions.          Lifestyle adjustments. Irrespective of treatment modalities applied, trigger control and lifestyle modification are indispensable to the successful management of migraine. This is especially important in children, adolescents, or pregnant women where drug treatments must be especially limited.    Although there is no robust evidence for most of the recommendations, most are general health measures that, given the lack of adverse effects and the benefit for general well-being, we consider should be recommended in all  patients.    Avoid triggers. Many patients attribute the onset or worsening of pain to specific triggers such as stress, sleep changes, food, or atmospheric changes, among others. It is even possible that the relationship occurs inversely, so that premonitory symptoms such as sleep disturbances and appetite 48 to 72 hours before the onset of pain can be misinterpreted by the patient as the trigger of migraine attacks. The therapeutic implications of this relationship are also unclear. There are possible triggers such as sleep deprivation, fasting, or certain foods that can be easily avoidable. But avoiding other triggers can lead to very restrictive lifestyles with a reduction in quality of life that does not outweigh the potential beneficial.    Sleep. Another complex relationship is headache and sleep. An excess or lack of sleep can trigger migraine attacks and at the same time rest is one of the most used treatments to improve the symptoms of the migraine attack. Additionally, migraine and other headaches occur comorbidly with sleep disorders. Patients with chronic migraine have a higher prevalence of sleep disorders, specifically poor sleep habits and nonrestorative rest.    Regarding general sleep measures for patients with headache, it is recommended to define regular sleep schedules that allow 8 hours of rest per day, insisting that they remain constant also during the weekend; have dinner 4 hours before bedtime and avoid liquids in the last two hours; and eliminate naps and avoid using screens, television, reading, or listening to music in bed. A nonpharmacological intervention to improve sleep habits can improve headache frequency and even reverse chronic to episodic migraine.    Diet. In the scientific literature, but especially in the informative websites and magazines, multiple and varied diets are proposed that aim to reduce the frequency of headaches. There are two main approaches: elimination diets,  which consist of suppressing potentially triggering foods such as chocolate, alcohol, cheese, nuts, or citrus fruits and diets that provide high or low amounts of certain components, ie, rich in vitamin B12, B6, or D or low in histamine, lactose, or fatty acids. The studies are not very rigorous and most do not have a control group (). In addition, it must be considered that food triggers were only associated with onset of headache in less than 10% of the participants.    Dietary recommendations for patients with migraine should be the same as for the general population with special emphasis on the prevention of obesity, which is a factor related to headache chronification. It is recommendable to have a varied diet, eating five meals a day to avoid periods of prolonged fasting and incorporating water intake to reach around 2.5 liters per day, which should be increased in case of physical activity or increase in temperature or humidity. Specific diets should be recommended solely based on whether there are other comorbidities in the patient.    Caffeine at moderate doses (< 400mg/day: equivalent to two cups of coffee) does not seem to have a negative effect on headache frequency although it should be taken regularly to avoid withdrawal headaches.    Although patients with migraine headaches and cluster headaches may be more susceptible to alcohol as a precipitant, there is no evidence to recommend abstinence from alcohol in all patients. Individual predisposition and cultural factors must be considered.    Exercise. Aerobic exercise can prevent or reduce symptoms of multiple chronic diseases, including headache. With some methodological limitations, there are studies that demonstrate benefits of aerobic exercise as a therapeutic intervention to reduce the frequency and intensity of headaches, as well as the quality of life measured by questionnaires. Exercise can have a beneficial effect on headaches directly but  also indirectly, improving sleep quality, mood, cardiovascular function, and preventing weight gain. In addition, it can improve the control of other diseases frequently comorbid with headache such as obesity, hypertension, anxiety, depression, or sleep disorders (). The clinical benefit of yoga as an add-on therapy in patients with episodic migraine has been demonstrated.
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
## 2024-06-19 NOTE — TELEPHONE ENCOUNTER
Natalie GRULLON 6/19/24 1153 am    Ben Rivas 1980 calling as I am a few minutes away. Appt is at 12 pm, but I will be arriving at 1206 pm.I am preregistered via my chart. Call me if needed at .    ----------------    Already addressed as pt was seen for appt today.

## 2024-06-25 ENCOUNTER — CONSULT (OUTPATIENT)
Dept: GYNECOLOGY | Facility: CLINIC | Age: 44
End: 2024-06-25
Payer: COMMERCIAL

## 2024-06-25 DIAGNOSIS — R10.2 PELVIC PAIN: Primary | ICD-10-CM

## 2024-06-25 DIAGNOSIS — N94.6 SEVERE MENSTRUAL CRAMPS: ICD-10-CM

## 2024-06-25 PROCEDURE — 99213 OFFICE O/P EST LOW 20 MIN: CPT | Performed by: OBSTETRICS & GYNECOLOGY

## 2024-06-27 ENCOUNTER — HOSPITAL ENCOUNTER (OUTPATIENT)
Dept: ULTRASOUND IMAGING | Facility: CLINIC | Age: 44
Discharge: HOME | End: 2024-06-27
Payer: COMMERCIAL

## 2024-06-27 DIAGNOSIS — N94.6 SEVERE MENSTRUAL CRAMPS: ICD-10-CM

## 2024-06-27 DIAGNOSIS — R10.2 PELVIC PAIN: ICD-10-CM

## 2024-06-27 PROCEDURE — 76830 TRANSVAGINAL US NON-OB: CPT

## 2024-06-27 PROCEDURE — 76856 US EXAM PELVIC COMPLETE: CPT

## 2024-07-01 DIAGNOSIS — J30.1 SEASONAL ALLERGIC RHINITIS DUE TO POLLEN: ICD-10-CM

## 2024-07-01 RX ORDER — FLUTICASONE PROPIONATE 50 MCG
SPRAY, SUSPENSION (ML) NASAL
Qty: 16 ML | Refills: 3 | Status: SHIPPED | OUTPATIENT
Start: 2024-07-01

## 2024-07-08 ENCOUNTER — TELEPHONE (OUTPATIENT)
Age: 44
End: 2024-07-08

## 2024-07-08 NOTE — TELEPHONE ENCOUNTER
Patient called in stating that she would like her Ultrasound results from 6/27/24. Patient was notified of Dr. Leary's recommendations, and patient had further questions she would like clarified by Dr. Leary. Pt is requesting that the provider contacts her directly to answer further questions

## 2024-07-16 ENCOUNTER — NURSE TRIAGE (OUTPATIENT)
Age: 44
End: 2024-07-16

## 2024-07-16 ENCOUNTER — OFFICE VISIT (OUTPATIENT)
Dept: URGENT CARE | Facility: CLINIC | Age: 44
End: 2024-07-16
Payer: COMMERCIAL

## 2024-07-16 VITALS
RESPIRATION RATE: 19 BRPM | BODY MASS INDEX: 21.34 KG/M2 | HEIGHT: 64 IN | OXYGEN SATURATION: 98 % | TEMPERATURE: 98 F | SYSTOLIC BLOOD PRESSURE: 92 MMHG | HEART RATE: 89 BPM | WEIGHT: 125 LBS | DIASTOLIC BLOOD PRESSURE: 83 MMHG

## 2024-07-16 DIAGNOSIS — R39.9 UTI SYMPTOMS: ICD-10-CM

## 2024-07-16 DIAGNOSIS — N30.01 ACUTE CYSTITIS WITH HEMATURIA: Primary | ICD-10-CM

## 2024-07-16 LAB
SL AMB  POCT GLUCOSE, UA: ABNORMAL
SL AMB LEUKOCYTE ESTERASE,UA: ABNORMAL
SL AMB POCT BILIRUBIN,UA: ABNORMAL
SL AMB POCT BLOOD,UA: ABNORMAL
SL AMB POCT CLARITY,UA: ABNORMAL
SL AMB POCT COLOR,UA: YELLOW
SL AMB POCT KETONES,UA: ABNORMAL
SL AMB POCT NITRITE,UA: ABNORMAL
SL AMB POCT PH,UA: 6
SL AMB POCT SPECIFIC GRAVITY,UA: 1.01
SL AMB POCT URINE PROTEIN: ABNORMAL
SL AMB POCT UROBILINOGEN: 0.2

## 2024-07-16 PROCEDURE — 87086 URINE CULTURE/COLONY COUNT: CPT | Performed by: PHYSICIAN ASSISTANT

## 2024-07-16 PROCEDURE — 99214 OFFICE O/P EST MOD 30 MIN: CPT | Performed by: PHYSICIAN ASSISTANT

## 2024-07-16 PROCEDURE — 81002 URINALYSIS NONAUTO W/O SCOPE: CPT | Performed by: PHYSICIAN ASSISTANT

## 2024-07-16 RX ORDER — CEPHALEXIN 500 MG/1
500 CAPSULE ORAL EVERY 8 HOURS SCHEDULED
Qty: 21 CAPSULE | Refills: 0 | Status: SHIPPED | OUTPATIENT
Start: 2024-07-16 | End: 2024-07-23

## 2024-07-16 NOTE — TELEPHONE ENCOUNTER
"Patient called in stating that she has blood in the urine and difficulty urinating, urgency, and pt reporting having to push on her bladder to get herself to urinate. Pt reporting the blood in the urine started today. Pt c/o flank pain 3-4/10. Pt reporting flank pain, blood in the urine and pain with urination. Pt denies fevers at this time.     Patient is advised to go to Urgent care to be further evaluated as the office isn't able to see the patient today since it is nearing the end of the day. Patient stated she will be going to [place] for further evaluation, pt advised to leave now, Pt verbalized understanding, no further questions at this time             Reason for Disposition   Side (flank) or lower back pain present    Answer Assessment - Initial Assessment Questions  1. SYMPTOM: \"What's the main symptom you're concerned about?\" (e.g., frequency, incontinence)      Patient called in stating that she has blood in the urine and difficulty urinating, urgency, and pt reporting having to push on her bladder to get herself to urinate. Pt reporting the blood in the urine started today.   3. PAIN: \"Is there any pain?\" If Yes, ask: \"How bad is it?\" (Scale: 1-10; mild, moderate, severe)      Yes, 8/10   4. CAUSE: \"What do you think is causing the symptoms?\"      Possible UTI   5. OTHER SYMPTOMS: \"Do you have any other symptoms?\" (e.g., fever, flank pain, blood in urine, pain with urination)      Pt reporting flank pain, blood in the urine, pain with urination. Pt denies fevers,   6. PREGNANCY: \"Is there any chance you are pregnant?\" \"When was your last menstrual period?\"      Denies    Protocols used: Urinary Symptoms-ADULT-[place]    "

## 2024-07-16 NOTE — PROGRESS NOTES
[facility]        NAME: Ben Rivas is a 43 y.o. female  : 1980    MRN: [id number]  DATE: 2024  TIME: 4:55 PM    Assessment and Plan   Acute cystitis with hematuria [N30.01]  1. Acute cystitis with hematuria  cephalexin (KEFLEX) 500 mg capsule      2. UTI symptoms  POCT urine dip    Urine culture            Patient Instructions     Patient Instructions   Discussed urine dip results with the patient. Recommended treatment with an antibiotic. We will send the urine for culture and call if any changes need to be made.  Recommended oral probiotics.     Follow up with PCP in 3-5 days.  Proceed to  ER if symptoms worsen.    If tests are performed, our office will contact you with results only if changes need to made to the care plan discussed with you at the visit. You can review your full results on [facility].      Chief Complaint     Chief Complaint   Patient presents with    Difficulty Urinating     Few days of blood in urine , pain and difficulty going, Bladder pain.          History of Present Illness       Difficulty Urinating   This is a new problem. The current episode started in the past 7 days. The problem occurs every urination. The problem has been unchanged. The quality of the pain is described as aching and burning. The pain is mild. There has been no fever. Associated symptoms include frequency, hematuria, hesitancy and urgency. Pertinent negatives include no chills, discharge, flank pain or sweats. She has tried nothing for the symptoms.       Review of Systems   Review of Systems   Constitutional:  Negative for chills.   Genitourinary:  Positive for difficulty urinating, dysuria, frequency, hematuria, hesitancy and urgency. Negative for dyspareunia, enuresis and flank pain.   Skin:  Negative for color change, pallor, rash and wound.   All other systems reviewed and are negative.        Current Medications       Current Outpatient Medications:     albuterol  (PROVENTIL HFA,VENTOLIN HFA) 90 mcg/act inhaler, Inhale, Disp: , Rfl:     Bioflavonoid Products (Suyapa-C) 500-550 MG TABS, Take 1,000 mg by mouth, Disp: , Rfl:     cephalexin (KEFLEX) 500 mg capsule, Take 1 capsule (500 mg total) by mouth every 8 (eight) hours for 7 days, Disp: 21 capsule, Rfl: 0    Cholecalciferol 50 MCG (2000 UT) CAPS, Take 3 capsules by mouth 87932 units daily liquid, Disp: , Rfl:     clotrimazole-betamethasone (LOTRISONE) 1-0.05 % cream, Apply topically 2 (two) times a day as needed (itching), Disp: 30 g, Rfl: 0    erythromycin (ILOTYCIN) ophthalmic ointment, , Disp: , Rfl:     Flarex 0.1 % ophthalmic suspension, , Disp: , Rfl:     fluticasone (FLONASE) 50 mcg/act nasal spray, USE 1 SPRAY IN EACH NOSTRIL TWICE A DAY WITH MEALS, Disp: 16 mL, Rfl: 3    lidocaine (XYLOCAINE) 2 % topical gel, Apply topically as needed for mild pain, Disp: 2 mL, Rfl: 2    rizatriptan (MAXALT-MLT) 5 mg disintegrating tablet, Take 1 tablet (5 mg total) by mouth once as needed for migraine for up to 1 dose may repeat in 2 hours if necessary, Disp: 10 tablet, Rfl: 5    Current Allergies     Allergies as of 07/16/2024    (No Known Allergies)            The following portions of the patient's history were reviewed and updated as appropriate: allergies, current medications, past family history, past medical history, past social history, past surgical history and problem list.     Past Medical History:   Diagnosis Date    Allergic     Anxiety     Asthma     Depression     Femoral hernia with obstruction     Gonorrhea 11/15/2021    Headache(784.0) 11/10/2023    Irregular menses     Malaise and fatigue     Neurogenic pain     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HCC)     Sleep apnea     Thymus disorder (HCC)     last assessed 4/17/14; resolved 7/2/15       Past Surgical History:   Procedure Laterality Date    DILATION AND CURETTAGE OF UTERUS      NO PAST SURGERIES      OSTEOTOMY  12/2020       Family History   Problem Relation  "Age of Onset    Asthma Mother     Hyperlipidemia Mother     Hypertension Mother     Obesity Mother     Hyperlipidemia Father     Hypertension Father     Diabetes type II Father     Heart disease Father         valvular         Medications have been verified.        Objective   BP 92/83   Pulse 89   Temp 98 °F (36.7 °C)   Resp 19   Ht 5' 4\" (1.626 m)   Wt 56.7 kg (125 lb)   LMP 07/06/2024   SpO2 98%   BMI 21.46 kg/m²        Physical Exam     Physical Exam  Vitals and nursing note reviewed.   Constitutional:       Appearance: Normal appearance.   Cardiovascular:      Rate and Rhythm: Normal rate and regular rhythm.   Pulmonary:      Effort: Pulmonary effort is normal.      Breath sounds: Normal breath sounds.   Abdominal:      General: Abdomen is flat.      Palpations: Abdomen is soft.      Tenderness: There is abdominal tenderness in the suprapubic area. There is no right CVA tenderness or left CVA tenderness.   Skin:     General: Skin is warm and dry.      Capillary Refill: Capillary refill takes less than 2 seconds.   Neurological:      General: No focal deficit present.      Mental Status: She is alert and oriented to person, place, and time.   Psychiatric:         Mood and Affect: Mood normal.         Behavior: Behavior normal.                   "

## 2024-07-16 NOTE — PATIENT INSTRUCTIONS
Discussed urine dip results with the patient. Recommended treatment with an antibiotic. We will send the urine for culture and call if any changes need to be made.  Recommended oral probiotics.     Follow up with PCP in 3-5 days.  Proceed to  ER if symptoms worsen.    If tests are performed, our office will contact you with results only if changes need to made to the care plan discussed with you at the visit. You can review your full results on [facility].

## 2024-07-18 LAB — BACTERIA UR CULT: NORMAL

## 2024-08-12 ENCOUNTER — TELEPHONE (OUTPATIENT)
Age: 44
End: 2024-08-12

## 2024-08-12 DIAGNOSIS — R30.0 DYSURIA: Primary | ICD-10-CM

## 2024-08-12 NOTE — TELEPHONE ENCOUNTER
Fredrick returned call to pt.  Pt states that she was seen at  for UTI.  She had completed her full course of antibiotics but states she's still experiencing a stinging sensation upon urination.  Pt stated that she tried to wait till her period was over to make sure she wasn't experiencing any blood in her urine.  Pt states she would like to retest her urine to make sure that the UTI is completely resolved, or if there is a UTI that it is not antibiotic resistant.  Please review and advise how pt should proceed.

## 2024-08-12 NOTE — TELEPHONE ENCOUNTER
Urine ordered to repeat! Sometimes the ABX that we choose do not cover certain bacteria, so we will repeat the urine with a culture

## 2024-08-13 ENCOUNTER — APPOINTMENT (OUTPATIENT)
Dept: LAB | Facility: CLINIC | Age: 44
End: 2024-08-13
Payer: COMMERCIAL

## 2024-08-13 DIAGNOSIS — R30.0 DYSURIA: ICD-10-CM

## 2024-08-13 LAB
BACTERIA UR QL AUTO: ABNORMAL /HPF
BILIRUB UR QL STRIP: NEGATIVE
CLARITY UR: CLEAR
COLOR UR: ABNORMAL
GLUCOSE UR STRIP-MCNC: NEGATIVE MG/DL
HGB UR QL STRIP.AUTO: NEGATIVE
KETONES UR STRIP-MCNC: NEGATIVE MG/DL
LEUKOCYTE ESTERASE UR QL STRIP: NEGATIVE
MUCOUS THREADS UR QL AUTO: ABNORMAL
NITRITE UR QL STRIP: NEGATIVE
NON-SQ EPI CELLS URNS QL MICRO: ABNORMAL /HPF
PH UR STRIP.AUTO: 7 [PH]
PROT UR STRIP-MCNC: ABNORMAL MG/DL
RBC #/AREA URNS AUTO: ABNORMAL /HPF
SP GR UR STRIP.AUTO: 1.02 (ref 1–1.03)
UROBILINOGEN UR STRIP-ACNC: <2 MG/DL
WBC #/AREA URNS AUTO: ABNORMAL /HPF

## 2024-08-13 PROCEDURE — 81001 URINALYSIS AUTO W/SCOPE: CPT

## 2024-08-14 ENCOUNTER — TELEPHONE (OUTPATIENT)
Age: 44
End: 2024-08-14

## 2024-08-14 DIAGNOSIS — R35.0 URINARY FREQUENCY: Primary | ICD-10-CM

## 2024-08-14 NOTE — TELEPHONE ENCOUNTER
----- Message from Amy Little PA-C sent at 8/14/2024  7:26 AM EDT -----  Urine is negative for UTI

## 2024-08-14 NOTE — TELEPHONE ENCOUNTER
Pt is still experiencing burning with urination, burning when she isn't urinating. Pt reports she had blood in urine was in June but its stopped since she was treated with abx. Pts reports abnormal discharge, and pt wants to know if she should be further tested for other things.  Please call pt back to follow up with patient on next steps.  PCP call pt at Phone: [phone].

## 2024-08-15 ENCOUNTER — APPOINTMENT (OUTPATIENT)
Dept: LAB | Facility: CLINIC | Age: 44
End: 2024-08-15
Payer: COMMERCIAL

## 2024-08-15 DIAGNOSIS — R35.0 URINARY FREQUENCY: ICD-10-CM

## 2024-08-15 LAB
BACTERIA UR QL AUTO: ABNORMAL /HPF
BILIRUB UR QL STRIP: NEGATIVE
CAOX CRY URNS QL MICRO: ABNORMAL /HPF
CLARITY UR: CLEAR
COLOR UR: ABNORMAL
GLUCOSE UR STRIP-MCNC: NEGATIVE MG/DL
HGB UR QL STRIP.AUTO: NEGATIVE
KETONES UR STRIP-MCNC: NEGATIVE MG/DL
LEUKOCYTE ESTERASE UR QL STRIP: NEGATIVE
MUCOUS THREADS UR QL AUTO: ABNORMAL
NITRITE UR QL STRIP: NEGATIVE
NON-SQ EPI CELLS URNS QL MICRO: ABNORMAL /HPF
PH UR STRIP.AUTO: 6 [PH]
PROT UR STRIP-MCNC: ABNORMAL MG/DL
RBC #/AREA URNS AUTO: ABNORMAL /HPF
SP GR UR STRIP.AUTO: 1.02 (ref 1–1.03)
UROBILINOGEN UR STRIP-ACNC: <2 MG/DL
WBC #/AREA URNS AUTO: ABNORMAL /HPF

## 2024-08-15 PROCEDURE — 87086 URINE CULTURE/COLONY COUNT: CPT

## 2024-08-15 PROCEDURE — 81001 URINALYSIS AUTO W/SCOPE: CPT

## 2024-08-16 ENCOUNTER — TELEPHONE (OUTPATIENT)
Age: 44
End: 2024-08-16

## 2024-08-16 LAB — BACTERIA UR CULT: NORMAL

## 2024-08-19 ENCOUNTER — OFFICE VISIT (OUTPATIENT)
Age: 44
End: 2024-08-19
Payer: COMMERCIAL

## 2024-08-19 ENCOUNTER — TELEPHONE (OUTPATIENT)
Age: 44
End: 2024-08-19

## 2024-08-19 VITALS
HEIGHT: 64 IN | SYSTOLIC BLOOD PRESSURE: 104 MMHG | BODY MASS INDEX: 19.97 KG/M2 | WEIGHT: 117 LBS | RESPIRATION RATE: 18 BRPM | HEART RATE: 112 BPM | OXYGEN SATURATION: 98 % | DIASTOLIC BLOOD PRESSURE: 70 MMHG

## 2024-08-19 DIAGNOSIS — Z00.00 WELL ADULT EXAM: Primary | ICD-10-CM

## 2024-08-19 DIAGNOSIS — R10.2 SUPRAPUBIC PAIN: Primary | ICD-10-CM

## 2024-08-19 DIAGNOSIS — M79.7 FIBROMYALGIA: ICD-10-CM

## 2024-08-19 DIAGNOSIS — R82.998 CALCIUM OXALATE CRYSTALS IN URINE: ICD-10-CM

## 2024-08-19 PROCEDURE — 99214 OFFICE O/P EST MOD 30 MIN: CPT | Performed by: INTERNAL MEDICINE

## 2024-08-19 NOTE — PATIENT INSTRUCTIONS
"Calcium oxalate stones -- Prevention of recurrent calcium oxalate stones is aimed at decreasing the concentrations of the lithogenic factors (calcium and oxalate) and increasing the concentrations of inhibitors of stone formation, such as citrate. Achieving these goals may require an increase in fluid intake (see 'Fluid intake' above), dietary modification, and the administration of appropriate medications. Specific recommendations should be based upon the results of 24-hour urine collection results, which should be performed before dietary modification or drug therapy is attempted. In addition, any medical conditions that are associated with calcium stones (eg, primary hyperparathyroidism) should be addressed as appropriate. (See \"Kidney stones in adults: Evaluation of the patient with established stone disease\", section on '24-hour urine collections'.)  Dietary modification -- From the viewpoint of diet, increasing the intake of fluid, dietary calcium, potassium, and phytate may be beneficial. In addition, decreasing the intake of oxalate, animal protein, sucrose, fructose, sodium, supplemental vitamin C, and supplemental calcium (as opposed to dietary calcium) may reduce the risk of calcium oxalate stones [13]. (See \"Kidney stones in adults: Epidemiology and risk factors\".)  Maintain adequate calcium intake -- For all patients with calcium oxalate stones, we suggest against a low calcium diet. (See \"Kidney stones in adults: Epidemiology and risk factors\", section on 'Calcium'.)  ?We generally encourage patients to consume several servings of dairy or other calcium-rich foods to reach 800 to 1000 mg/day. Some plant-based milk alternatives are calcium enriched and low in oxalate and may be suitable options in place of traditional dairy offerings [14].  ?Calcium supplements should not be routinely used to achieve adequate dietary calcium intake in patients with calcium oxalate stones, as they do not appear to be " "effective in preventing recurrent stones and may even slightly increase risk [15,16]. In patients with a history of stones who require calcium supplements (eg, for the treatment of osteoporosis, or as an oxalate binder in patients with gastrointestinal malabsorption), a suggested approach is to measure urinary calcium excretion before and approximately one month after starting the calcium supplement. If there is a clinically important increase in urinary calcium excretion, the supplement should be discontinued or the dose should be reduced. In this setting, the initiation of a thiazide diuretic may be useful to reduce urinary calcium excretion (and potentially to help maintain bone density). In addition, if a calcium supplement is used, it should be taken with a meal. (See 'High urine calcium' below.)  Higher urine calcium is a common finding in stone formers, but restricting dietary calcium intake is not generally recommended unless it is excessive (more than 1200 mg/day). Although urine calcium excretion may decrease with restriction, the decrease in free intestinal calcium can lead to increased absorption of dietary oxalate and enhanced oxalate excretion, due to decreased binding of oxalate by calcium in the intestinal lumen. The net effect may be increased supersaturation of the urine with respect to calcium oxalate and an enhanced tendency to stone formation. (See \"Kidney stones in adults: Epidemiology and risk factors\", section on 'Dietary factors'.)  The ability to help prevent new stone formation with adequate calcium intake was shown in part by a five-year randomized trial that compared two diets among males with idiopathic hypercalciuria and recurrent calcium oxalate stones [7]. In this trial, 120 such males were randomly assigned to either a diet consisting of calcium (1200 mg/day [30 mmol/day]) and lower amounts of animal protein (52 g/day) and salt (2900 mg/day [50 mmol/day] of sodium chloride) or a " "diet containing a low amount of calcium (400 mg/day [10 mmol/day]). At five years, the group assigned to the adequate calcium, low-animal protein, low-salt diet had a lower risk of stone recurrence (unadjusted relative risk 0.49, 95% CI 0.24-0.98). However, the independent effect of calcium is unclear given that the amounts of animal protein and salt ingested among those in the low-calcium diet differed from that of patients in the normal-calcium diet group. Nonetheless, the low-calcium intervention was not beneficial and is not recommended.  In addition to increasing stone formation, a low-calcium diet may have a second deleterious effect in patients with idiopathic hypercalciuria: development of negative calcium balance [13,17]. This extra loss of calcium can exacerbate the already diminished bone density in some of these patients [17,18], a complication that may be due to enhanced bone resorption.  Reduce nondairy animal protein intake -- For all patients with calcium oxalate stones, we suggest reducing nondairy animal protein intake. (See \"Kidney stones in adults: Epidemiology and risk factors\", section on 'Protein'.)  Protein load increases urine stone-forming tendency  Figure 1  Adverse changes in urinary calcium and citrate excretion can be induced by a high-protein diet since the metabolism of sulfur-containing amino acids increases the daily acid load by generating sulfuric acid (figure 1). Nondairy animal protein is much more likely to induce this effect than vegetable protein since it has a higher sulfur content and therefore generates more acid [19]. How acid loading produces these changes is discussed elsewhere. (See \"Kidney stones in adults: Epidemiology and risk factors\", section on 'Protein'.)  Thus, lowering animal protein intake will produce favorable changes in the urine [13]. However, it has not been proven that this will reduce the likelihood of stone formation. In one randomized trial, for " "example, reduced animal protein in association with higher dietary calcium and lower dietary sodium reduced the risk of stone recurrence [7], but the individual impact of animal protein could not be determined. A subsequent randomized trial found that, compared with a high fluid intake plus a diet rich in calcium, a low animal protein intake does not reduce stone recurrence [20]. In addition, observational studies have been inconsistent, suggesting that a high-animal protein diet was a risk factor for incident kidney stones in males and older females but not in younger females [11]. Based upon the available data, it would be prudent to avoid excessive nondairy animal protein intake for all calcium stone formers.  Limit oxalate intake -- For all patients with calcium oxalate stones, we suggest limiting intake of high oxalate foods and high-dose supplemental vitamin C. However, excessive restriction of oxalate is not likely to be helpful; patients should continue to consume a wide variety of fruits and vegetables while avoiding those very high in oxalate. (See \"Kidney stones in adults: Epidemiology and risk factors\", section on 'High urine oxalate' and \"Kidney stones in adults: Epidemiology and risk factors\", section on 'Oxalate'.)  Oxalate content of high-oxalate foods  Table 1  Some foods contain very large amounts of oxalate, and those should be avoided (eg, spinach, rhubarb, potatoes). In addition, some nuts and legumes are also high (eg, almonds) or moderate (eg, peanuts, cashews) in oxalate, and their intake should be limited. High-oxalate foods should be avoided regardless of urine oxalate since there may be a period of very high urinary oxalate excretion soon after the food is consumed. If a low-oxalate diet is recommended, it should only be continued if there is documented evidence that the urine oxalate excretion has fallen. The oxalate content of foods is available at the following website (table " "1).  However, there is scant evidence that low-oxalate diets reduce the risk of stone formation. In prospective observational studies of individuals who had never had a stone, overall higher dietary oxalate only slightly increased the risk of incident stone formation in males and older females; there was no association in younger females [21]. However, the risk was higher with increasing oxalate intake among those with calcium intake below the median, again demonstrating that adequate dietary calcium intake may reduce the risk of stone formation. Because of the documented health benefits of many foods that are traditionally considered high in oxalate (but still contain 10 mg or less of oxalate per serving), strict oxalate restriction does not seem to be supported. As noted above, some foods traditionally believed to be high in oxalate, such as tea, do not increase the risk of stone formation.  High-dose supplemental vitamin C appears to increase urine oxalate excretion in certain individuals [22,23] and the risk of stone formation [24]; thus, high-dose supplements should be avoided in those with calcium oxalate stones.  Limit sucrose and fructose intake -- For all patients with calcium oxalate stones, we suggest limiting intake of sucrose and fructose. Sucrose intake increases urine calcium independent of calcium intake and has been associated with an increased risk of stones [25]. Fructose intake also is associated with an increased risk of stone formation [26]. (See \"Kidney stones in adults: Epidemiology and risk factors\", section on 'Sucrose'.)  Drug therapy for specific metabolic abnormalities -- Drug therapy is indicated if the stone disease remains active (as evidenced by the formation of new stones, enlargement of old stones, or the ongoing passage of gravel) or if there is insufficient improvement in the urine chemistries despite attempted dietary modification over a three- to six-month period (see " 'Monitoring the response' below). The aim of therapy is to prevent further calcium oxalate precipitation; dissolution of already existing calcium oxalate stones is highly unlikely (in comparison to uric acid or cystine stones). Thus, passage of an existing stone does not necessarily reflect a therapeutic failure in a patient known to have a preexisting kidney stone prior to the institution of therapy.  Initial drug therapy varies with the metabolic abnormality that is present:  ?Thiazide diuretics to reduce urine calcium  ?Allopurinol for high urine uric acid  ?Potassium citrate or potassium bicarbonate for low urine citrate  The use of preventive drug therapy has been associated with a lower risk of clinically significant stone recurrence [27,28]. In a study of [id number] adults (mean age 57 years) with urinary stone disease and at least one urinary abnormality (hypercalciuria, hypocitraturia, or hyperuricosuria) on 24-hour urine collection, treatment with preventive drug therapy (thiazide diuretics, alkali therapy, or uric acid-lowering medications) was associated with a 19 percent lower risk of recurrent stone disease requiring emergency department visits, hospitalization, or surgery over 12 to 36 months compared with no treatment [27]. This benefit did not reach statistical significance over longer follow-up periods (12 to 48 and 12 to 60 months). The reason for this decreased effectiveness over time is unclear since factors that could potentially affect treatment efficacy (eg, the use of tailored drug doses, treatment adherence, drug intolerance due to adverse effects, treatment of hypokalemia, concomitant dietary changes) were not reported. A similar benefit with preventive drug therapy has also been seen among older patients (mean age 66 to 71 years) with stone disease [28].

## 2024-08-19 NOTE — ASSESSMENT & PLAN NOTE
Patient has a history of fibromyalgia   Currently receiving physical therapy outpatient    Plan   Continue aerobic and strengthening exercise  Relaxation techniques  Encourage proper sleep hygiene  Continue physical therapy

## 2024-08-19 NOTE — ASSESSMENT & PLAN NOTE
Patient with history of fibromyalgia, and complaints of suprapubic pain, dysuria, urgency, hesitency.  Patient urinalysis was positive for trace proteins, 2-4 wbcs, moderate mucus, innumerable calcium oxalate crystals   Urine culture <10,000 CFUs  Patient has one sexual partner, last intercourse in January  Denies any systemic sx.   No foul smelling discharge; does endorse some brownish mucinous discharge.   H/o of painful menses  Recent pelvic ultrasound showed no abnormalities, bladder was slightly thickened likely due to under distention    Plan  Will recommend OB gyn follow up  Maintain adequate hydration  Low oxalate diet

## 2024-08-19 NOTE — TELEPHONE ENCOUNTER
Patient said she forgot to tel you that she no longer is taking in any form of sugar in her diet. And, she would like for you to add labs for her to do for her physical next month, please and thank you

## 2024-08-19 NOTE — ASSESSMENT & PLAN NOTE
Patient urinalysis was positive for trace proteins, 2-4 wbcs, moderate mucus, innumerable calcium oxalate crystals  Patient states relief of her symptoms when drinking sodium bicarb water  No urinary stones noted in CT scan of 2023, or recent pelvic ultrasound.     Plan:  Maintain adequate hydration  Recommended low oxalate diet  Avoid high intake of vitamin C  Adequate calcium in diet

## 2024-08-19 NOTE — PROGRESS NOTES
Ambulatory Visit  Name: Ben Rivas      : 1980      MRN: [id number]  Encounter Provider: Landry Mart MD  Encounter Date: 2024   Encounter department: [facility] INTERNAL MEDICINE [facility]    Assessment & Plan   1. Suprapubic pain  Assessment & Plan:  Patient with history of fibromyalgia, and complaints of suprapubic pain, dysuria, urgency, hesitency.  Patient urinalysis was positive for trace proteins, 2-4 wbcs, moderate mucus, innumerable calcium oxalate crystals   Urine culture <10,000 CFUs  Patient has one sexual partner, last intercourse in January  Denies any systemic sx.   No foul smelling discharge; does endorse some brownish mucinous discharge.   H/o of painful menses  Recent pelvic ultrasound showed no abnormalities, bladder was slightly thickened likely due to under distention    Plan  Will recommend OB gyn follow up  Maintain adequate hydration  Low oxalate diet        2. Calcium oxalate crystals in urine  Assessment & Plan:  Patient urinalysis was positive for trace proteins, 2-4 wbcs, moderate mucus, innumerable calcium oxalate crystals  Patient states relief of her symptoms when drinking sodium bicarb water  No urinary stones noted in CT scan of , or recent pelvic ultrasound.     Plan:  Maintain adequate hydration  Recommended low oxalate diet  Avoid high intake of vitamin C  Adequate calcium in diet  3. Fibromyalgia  Assessment & Plan:  Patient has a history of fibromyalgia   Currently receiving physical therapy outpatient    Plan   Continue aerobic and strengthening exercise  Relaxation techniques  Encourage proper sleep hygiene  Continue physical therapy    History of Present Illness   Patient presents with symptoms of lower abdominal suprapubic pain, and dysuria. She states she has been having these symptoms on and off since  but most recent flare up began this . Patient states she received oral antbiotics after going to the urgent care in July and returned  back currently because she is still having symptoms. Patient presented with complaints of lower abdominal pain, dysuria, frequency, urgency, hesitency. Patient states that drinking baking soda in water seems to help relieve these symptoms. She also has little relief with ibuprofen. Patient denies any fevers, nausea, vomiting. But she does endorse chills down her legs, recent history of hematuria in urine in July after her period. She states she has mucinous vaginal discharge, that appears brownish. No foul odor. She has 1 sexual partner and last had intercourse in January. She has been following up with her gynecologist, last pap smear was in February and recently had a pelvic ultrasound done there as well. She takes a probiotic to maintain her vaginal silviano.         Review of Systems   Constitutional:  Positive for chills (down her legs). Negative for fever.   Respiratory:  Negative for shortness of breath.    Cardiovascular:  Negative for chest pain.   Gastrointestinal:  Positive for abdominal pain and constipation. Negative for diarrhea, nausea and vomiting.   Genitourinary:  Positive for difficulty urinating, dysuria, frequency, hematuria, urgency and vaginal discharge.     Pertinent Medical History   Fibromyalgia, Painful menses, IBS    Current Outpatient Medications on File Prior to Visit   Medication Sig Dispense Refill    albuterol (PROVENTIL HFA,VENTOLIN HFA) 90 mcg/act inhaler Inhale      Bioflavonoid Products (Suyapa-C) 500-550 MG TABS Take 1,000 mg by mouth      Cholecalciferol 50 MCG (2000 UT) CAPS Take 3 capsules by mouth 87943 units daily liquid      clotrimazole-betamethasone (LOTRISONE) 1-0.05 % cream Apply topically 2 (two) times a day as needed (itching) 30 g 0    erythromycin (ILOTYCIN) ophthalmic ointment       Flarex 0.1 % ophthalmic suspension       fluticasone (FLONASE) 50 mcg/act nasal spray USE 1 SPRAY IN EACH NOSTRIL TWICE A DAY WITH MEALS 16 mL 3    lidocaine (XYLOCAINE) 2 % topical gel  "Apply topically as needed for mild pain 2 mL 2    rizatriptan (MAXALT-MLT) 5 mg disintegrating tablet Take 1 tablet (5 mg total) by mouth once as needed for migraine for up to 1 dose may repeat in 2 hours if necessary 10 tablet 5     No current facility-administered medications on file prior to visit.      Objective     /70 (BP Location: Left arm, Patient Position: Sitting, Cuff Size: Standard)   Pulse (!) 112   Resp 18   Ht 5' 4\" (1.626 m)   Wt 53.1 kg (117 lb)   SpO2 98%   BMI 20.08 kg/m²     Physical Exam  Vitals and nursing note reviewed.   Constitutional:       General: She is not in acute distress.     Appearance: She is well-developed.   HENT:      Head: Normocephalic and atraumatic.   Eyes:      Conjunctiva/sclera: Conjunctivae normal.   Cardiovascular:      Rate and Rhythm: Normal rate and regular rhythm.      Heart sounds: No murmur heard.  Pulmonary:      Effort: Pulmonary effort is normal. No respiratory distress.      Breath sounds: Normal breath sounds.   Abdominal:      Palpations: Abdomen is soft.      Tenderness: There is abdominal tenderness in the suprapubic area.   Musculoskeletal:         General: No swelling.      Cervical back: Neck supple.   Skin:     General: Skin is warm and dry.      Capillary Refill: Capillary refill takes less than 2 seconds.   Neurological:      Mental Status: She is alert.   Psychiatric:         Mood and Affect: Mood normal.         I have spent a total time of 30 minutes in caring for this patient on the day of the visit/encounter including Instructions for management, Risk factor reductions, Impressions, and Counseling / Coordination of care.        "

## 2024-08-22 ENCOUNTER — NURSE TRIAGE (OUTPATIENT)
Age: 44
End: 2024-08-22

## 2024-08-22 NOTE — TELEPHONE ENCOUNTER
"Spoke with patient who reports she has been having ongoing urinary issues. She reports calcium crystals, mucus, wbc's, protein and US shows thickening of the bladder wall.  She denies any vaginal discharge bleeding or odor.  Reports some vulvar irritation.  She further reports 10/10 abd pain.  She was advised to go to the ER for further evaluation and to f/u with urology.  Number for [facility] given to find a urologist in her area.  No further questions or concerns at this time.    Reason for Disposition   Patient sounds very sick or weak to the triager    Answer Assessment - Initial Assessment Questions  1. SYMPTOM: \"What's the main symptom you're concerned about?\" (e.g., frequency, incontinence)      Urinary frequency, vulvar burning, abd pain  2. ONSET: \"When did the  this  start?\"      2021 but has flared up in June  3. PAIN: \"Is there any pain?\" If Yes, ask: \"How bad is it?\" (Scale: 1-10; mild, moderate, severe)      10/10  4. CAUSE: \"What do you think is causing the symptoms?\"      unsure  5. OTHER SYMPTOMS: \"Do you have any other symptoms?\" (e.g., fever, flank pain, blood in urine, pain with urination)      denies  6. PREGNANCY: \"Is there any chance you are pregnant?\" \"When was your last menstrual period?\"      8/4/24    Protocols used: Urinary Symptoms-ADULT-[place]    "

## 2024-08-23 ENCOUNTER — HOSPITAL ENCOUNTER (EMERGENCY)
Facility: HOSPITAL | Age: 44
Discharge: HOME | End: 2024-08-23
Attending: EMERGENCY MEDICINE
Payer: COMMERCIAL

## 2024-08-23 VITALS
OXYGEN SATURATION: 98 % | RESPIRATION RATE: 18 BRPM | TEMPERATURE: 98 F | SYSTOLIC BLOOD PRESSURE: 149 MMHG | DIASTOLIC BLOOD PRESSURE: 79 MMHG | HEART RATE: 92 BPM

## 2024-08-23 DIAGNOSIS — R30.0 DYSURIA: Primary | ICD-10-CM

## 2024-08-23 LAB
ANION GAP SERPL CALCULATED.3IONS-SCNC: 8 MMOL/L (ref 4–13)
BASOPHILS # BLD AUTO: 0.05 THOUSANDS/ÂΜL (ref 0–0.1)
BASOPHILS NFR BLD AUTO: 1 % (ref 0–1)
BILIRUB UR QL STRIP: NEGATIVE
BUN SERPL-MCNC: 15 MG/DL (ref 5–25)
CALCIUM SERPL-MCNC: 9.5 MG/DL (ref 8.4–10.2)
CHLORIDE SERPL-SCNC: 108 MMOL/L (ref 96–108)
CLARITY UR: CLEAR
CO2 SERPL-SCNC: 23 MMOL/L (ref 21–32)
COLOR UR: NORMAL
CREAT SERPL-MCNC: 0.73 MG/DL (ref 0.6–1.3)
EOSINOPHIL # BLD AUTO: 0.03 THOUSAND/ÂΜL (ref 0–0.61)
EOSINOPHIL NFR BLD AUTO: 1 % (ref 0–6)
ERYTHROCYTE [DISTWIDTH] IN BLOOD BY AUTOMATED COUNT: 13.4 % (ref 11.6–15.1)
GFR SERPL CREATININE-BSD FRML MDRD: 101 ML/MIN/1.73SQ M
GLUCOSE SERPL-MCNC: 88 MG/DL (ref 65–140)
GLUCOSE UR STRIP-MCNC: NEGATIVE MG/DL
HCT VFR BLD AUTO: 42.4 % (ref 34.8–46.1)
HGB BLD-MCNC: 13.4 G/DL (ref 11.5–15.4)
HGB UR QL STRIP.AUTO: NEGATIVE
IMM GRANULOCYTES # BLD AUTO: 0.01 THOUSAND/UL (ref 0–0.2)
IMM GRANULOCYTES NFR BLD AUTO: 0 % (ref 0–2)
KETONES UR STRIP-MCNC: NEGATIVE MG/DL
LEUKOCYTE ESTERASE UR QL STRIP: NEGATIVE
LYMPHOCYTES # BLD AUTO: 2.22 THOUSANDS/ÂΜL (ref 0.6–4.47)
LYMPHOCYTES NFR BLD AUTO: 39 % (ref 14–44)
MAGNESIUM SERPL-MCNC: 1.9 MG/DL (ref 1.9–2.7)
MCH RBC QN AUTO: 27.2 PG (ref 26.8–34.3)
MCHC RBC AUTO-ENTMCNC: 31.6 G/DL (ref 31.4–37.4)
MCV RBC AUTO: 86 FL (ref 82–98)
MONOCYTES # BLD AUTO: 0.52 THOUSAND/ÂΜL (ref 0.17–1.22)
MONOCYTES NFR BLD AUTO: 9 % (ref 4–12)
NEUTROPHILS # BLD AUTO: 2.88 THOUSANDS/ÂΜL (ref 1.85–7.62)
NEUTS SEG NFR BLD AUTO: 50 % (ref 43–75)
NITRITE UR QL STRIP: NEGATIVE
NRBC BLD AUTO-RTO: 0 /100 WBCS
PH UR STRIP.AUTO: 7 [PH]
PLATELET # BLD AUTO: 226 THOUSANDS/UL (ref 149–390)
PMV BLD AUTO: 10.2 FL (ref 8.9–12.7)
POTASSIUM SERPL-SCNC: 3.7 MMOL/L (ref 3.5–5.3)
PROT UR STRIP-MCNC: NEGATIVE MG/DL
RBC # BLD AUTO: 4.92 MILLION/UL (ref 3.81–5.12)
SODIUM SERPL-SCNC: 139 MMOL/L (ref 135–147)
SP GR UR STRIP.AUTO: 1.02 (ref 1–1.03)
UROBILINOGEN UR STRIP-ACNC: <2 MG/DL
WBC # BLD AUTO: 5.71 THOUSAND/UL (ref 4.31–10.16)

## 2024-08-23 PROCEDURE — 85025 COMPLETE CBC W/AUTO DIFF WBC: CPT | Performed by: EMERGENCY MEDICINE

## 2024-08-23 PROCEDURE — 99284 EMERGENCY DEPT VISIT MOD MDM: CPT | Performed by: EMERGENCY MEDICINE

## 2024-08-23 PROCEDURE — 36415 COLL VENOUS BLD VENIPUNCTURE: CPT | Performed by: EMERGENCY MEDICINE

## 2024-08-23 PROCEDURE — 81003 URINALYSIS AUTO W/O SCOPE: CPT | Performed by: EMERGENCY MEDICINE

## 2024-08-23 PROCEDURE — 80048 BASIC METABOLIC PNL TOTAL CA: CPT | Performed by: EMERGENCY MEDICINE

## 2024-08-23 PROCEDURE — 83735 ASSAY OF MAGNESIUM: CPT | Performed by: EMERGENCY MEDICINE

## 2024-08-23 PROCEDURE — 99284 EMERGENCY DEPT VISIT MOD MDM: CPT

## 2024-08-23 RX ORDER — FLUCONAZOLE 150 MG/1
150 TABLET ORAL ONCE
Qty: 1 TABLET | Refills: 0 | Status: SHIPPED | OUTPATIENT
Start: 2024-08-23 | End: 2024-08-23

## 2024-08-23 NOTE — DISCHARGE INSTRUCTIONS
A  personal message from Dr. Good Green,  Thank you so much for allowing me to care for you today.    I pride myself in the care and attention I give all my patients.  I hope you were a witness to this tonight.   If for any reason your condition does not improve or worsens, or you have a question that was not answered during your visit you can feel free to text me on my personal phone #  # [phone].   I will answer to your message and continue your care past your emergency room visit.     Please understand that although you are being discharged because your condition has been deemed stable and able to be managed on an outpatient setting. However your condition may worsen as part of the natural progression of the illness/condition, if this occurs please come back to the emergency department for a repeat evaluation.

## 2024-08-23 NOTE — ED PROVIDER NOTES
"History  Chief Complaint   Patient presents with    Difficulty Urinating     Patient reports being treated for a UTI with PCP recently but due to continued bilateral lower quadrant pain and pain with urination they have had their urine tested with PCP several times again and reports \"there are still crystals in the urine\". Patient reports new onset of bright red blood in stool today, reports these symptoms have recurred over a long period of time and is interested in testing for crohn's disease. Patient reports already seeing GI.      Severianojadon Rivas is a 43 y.o.  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 Patient reports new onset of bright red blood in stool today, reports these symptoms have recurred over a long period of time and is interested in testing for crohn's disease. Patient reports already seeing GI.     Patient with dysuria and external genitalia burning but also this burning is in the bladder area of her abdomen.  Slight whitish discharge.  She feels like her vulva is inflamed and swollen.  Patient has been to her primary care doctor last month " diagnosed with a UTI had a bladder scan that showed some bladder wall thickening.  She was treated with antibiotics.    Just recently was to the doctors again and at that point they saw some white cells and some protein in her urine, she was not diagnosed at the time with a urinary tract infection and was not placed on antibiotics again.    History obtained directly from the PATIENT              History provided by:  Patient   used: No    Difficulty Urinating  Associated symptoms: no abdominal pain, no fever, no vaginal discharge and no vomiting        Prior to Admission Medications   Prescriptions Last Dose Informant Patient Reported? Taking?   Bioflavonoid Products (Suyapa-C) 500-550 MG TABS  Self Yes No   Sig: Take 1,000 mg by mouth   Cholecalciferol 50 MCG (2000 UT) CAPS  Self Yes No   Sig: Take 3 capsules by mouth 30581 units daily liquid   Flarex 0.1 % ophthalmic suspension  Self Yes No   albuterol (PROVENTIL HFA,VENTOLIN HFA) 90 mcg/act inhaler  Self Yes No   Sig: Inhale   clotrimazole-betamethasone (LOTRISONE) 1-0.05 % cream  Self No No   Sig: Apply topically 2 (two) times a day as needed (itching)   erythromycin (ILOTYCIN) ophthalmic ointment  Self Yes No   fluticasone (FLONASE) 50 mcg/act nasal spray   No No   Sig: USE 1 SPRAY IN EACH NOSTRIL TWICE A DAY WITH MEALS   lidocaine (XYLOCAINE) 2 % topical gel   No No   Sig: Apply topically as needed for mild pain   rizatriptan (MAXALT-MLT) 5 mg disintegrating tablet  Self No No   Sig: Take 1 tablet (5 mg total) by mouth once as needed for migraine for up to 1 dose may repeat in 2 hours if necessary      Facility-Administered Medications: None       Past Medical History:   Diagnosis Date    Allergic     Anxiety     Asthma     Depression     Femoral hernia with obstruction     Gonorrhea 11/15/2021    Headache(784.0) 11/10/2023    Irregular menses     Malaise and fatigue     Neurogenic pain     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HCC)     Sleep  apnea     Thymus disorder (HCC)     last assessed 4/17/14; resolved 7/2/15       Past Surgical History:   Procedure Laterality Date    DILATION AND CURETTAGE OF UTERUS      NO PAST SURGERIES      OSTEOTOMY  12/2020       Family History   Problem Relation Age of Onset    Asthma Mother     Hyperlipidemia Mother     Hypertension Mother     Obesity Mother     Hyperlipidemia Father     Hypertension Father     Diabetes type II Father     Heart disease Father         valvular     I have reviewed and agree with the history as documented.    E-Cigarette/Vaping    E-Cigarette Use Never User      E-Cigarette/Vaping Substances    Nicotine No     THC No     CBD No     Flavoring No     Other No     Unknown No      Social History     Tobacco Use    Smoking status: Never    Smokeless tobacco: Never   Vaping Use    Vaping status: Never Used   Substance Use Topics    Alcohol use: No    Drug use: No       Review of Systems   Constitutional:  Negative for chills and fever.   HENT:  Negative for ear pain and sore throat.    Eyes:  Negative for pain and visual disturbance.   Respiratory:  Negative for cough and shortness of breath.    Cardiovascular:  Negative for chest pain and palpitations.   Gastrointestinal:  Negative for abdominal pain and vomiting.   Genitourinary:  Positive for dysuria, pelvic pain and vaginal pain. Negative for decreased urine volume, hematuria, vaginal bleeding and vaginal discharge.   Musculoskeletal:  Negative for arthralgias and back pain.   Skin:  Negative for color change and rash.   Neurological:  Negative for seizures and syncope.   All other systems reviewed and are negative.      Physical Exam  Physical Exam  Vitals and nursing note reviewed.   Constitutional:       General: She is not in acute distress.     Appearance: She is well-developed.   HENT:      Head: Normocephalic and atraumatic.   Eyes:      Conjunctiva/sclera: Conjunctivae normal.   Cardiovascular:      Rate and Rhythm: Normal rate and  regular rhythm.      Heart sounds: No murmur heard.  Pulmonary:      Effort: Pulmonary effort is normal. No respiratory distress.      Breath sounds: Normal breath sounds.   Abdominal:      Palpations: Abdomen is soft.      Tenderness: There is no abdominal tenderness.   Genitourinary:     General: Normal vulva.      Comments: Very slight discharge present white.  No vulvar swelling.  No lesions.  Musculoskeletal:         General: No swelling.      Cervical back: Neck supple.   Skin:     General: Skin is warm and dry.      Capillary Refill: Capillary refill takes less than 2 seconds.   Neurological:      General: No focal deficit present.      Mental Status: She is alert and oriented to person, place, and time.   Psychiatric:         Mood and Affect: Mood normal.         Thought Content: Thought content normal.         Judgment: Judgment normal.         Vital Signs  ED Triage Vitals [08/23/24 1422]   Temperature Pulse Respirations Blood Pressure SpO2   98 °F (36.7 °C) 92 18 149/79 98 %      Temp Source Heart Rate Source Patient Position - Orthostatic VS BP Location FiO2 (%)   Oral Monitor Lying Left arm --      Pain Score       --           Vitals:    08/23/24 1422   BP: 149/79   Pulse: 92   Patient Position - Orthostatic VS: Lying         Visual Acuity      ED Medications  Medications - No data to display    Diagnostic Studies  Results Reviewed       Procedure Component Value Units Date/Time    Basic metabolic panel [[id number]] Collected: 08/23/24 1448    Lab Status: Final result Specimen: Blood from Arm, Left Updated: 08/23/24 1521     Sodium 139 mmol/L      Potassium 3.7 mmol/L      Chloride 108 mmol/L      CO2 23 mmol/L      ANION GAP 8 mmol/L      BUN 15 mg/dL      Creatinine 0.73 mg/dL      Glucose 88 mg/dL      Calcium 9.5 mg/dL      eGFR 101 ml/min/1.73sq m     Narrative:      National Kidney Disease Foundation guidelines for Chronic Kidney Disease (CKD):     Stage 1 with normal or high GFR (GFR > 90  mL/min/1.73 square meters)    Stage 2 Mild CKD (GFR = 60-89 mL/min/1.73 square meters)    Stage 3A Moderate CKD (GFR = 45-59 mL/min/1.73 square meters)    Stage 3B Moderate CKD (GFR = 30-44 mL/min/1.73 square meters)    Stage 4 Severe CKD (GFR = 15-29 mL/min/1.73 square meters)    Stage 5 End Stage CKD (GFR <15 mL/min/1.73 square meters)  Note: GFR calculation is accurate only with a steady state creatinine    Magnesium [[id number]]  (Normal) Collected: 08/23/24 1448    Lab Status: Final result Specimen: Blood from Arm, Left Updated: 08/23/24 1521     Magnesium 1.9 mg/dL     KOH prep [[id number]] Collected: 08/23/24 1508    Lab Status: In process Specimen: Other from Genital Updated: 08/23/24 1510    CBC and differential [[id number]] Collected: 08/23/24 1448    Lab Status: Final result Specimen: Blood from Arm, Left Updated: 08/23/24 1458     WBC 5.71 Thousand/uL      RBC 4.92 Million/uL      Hemoglobin 13.4 g/dL      Hematocrit 42.4 %      MCV 86 fL      MCH 27.2 pg      MCHC 31.6 g/dL      RDW 13.4 %      MPV 10.2 fL      Platelets 226 Thousands/uL      nRBC 0 /100 WBCs      Segmented % 50 %      Immature Grans % 0 %      Lymphocytes % 39 %      Monocytes % 9 %      Eosinophils Relative 1 %      Basophils Relative 1 %      Absolute Neutrophils 2.88 Thousands/µL      Absolute Immature Grans 0.01 Thousand/uL      Absolute Lymphocytes 2.22 Thousands/µL      Absolute Monocytes 0.52 Thousand/µL      Eosinophils Absolute 0.03 Thousand/µL      Basophils Absolute 0.05 Thousands/µL     UA w Reflex to Microscopic w Reflex to Culture [[id number]] Collected: 08/23/24 1416    Lab Status: Final result Specimen: Urine, Clean Catch Updated: 08/23/24 1434     Color, UA Light Yellow     Clarity, UA Clear     Specific Gravity, UA 1.025     pH, UA 7.0     Leukocytes, UA Negative     Nitrite, UA Negative     Protein, UA Negative mg/dl      Glucose, UA Negative mg/dl      Ketones, UA Negative mg/dl      Urobilinogen, UA <2.0 mg/dl       Bilirubin, UA Negative     Occult Blood, UA Negative                   No orders to display              Procedures  Procedures         ED Course  ED Course as of 08/23/24 2318   Fri Aug 23, 2024   1459 WBC: 5.71   1459 Hemoglobin: 13.4   1459 Color, UA: Light Yellow   1459 Leukocytes, UA: Negative   1459 Nitrite, UA: Negative   1459 POCT URINE PROTEIN: Negative   1459 Glucose, UA: Negative   1459 Ketones, UA: Negative   1459 Bilirubin Urine: Negative   1459 Blood, UA: Negative                                 SBIRT 22yo+      Flowsheet Row Most Recent Value   Initial Alcohol Screen: US AUDIT-C     1. How often do you have a drink containing alcohol? 0 Filed at: 08/23/2024 1509   2. How many drinks containing alcohol do you have on a typical day you are drinking?  0 Filed at: 08/23/2024 1509   3a. Male UNDER 65: How often do you have five or more drinks on one occasion? 0 Filed at: 08/23/2024 1509   3b. FEMALE Any Age, or MALE 65+: How often do you have 4 or more drinks on one occassion? 0 Filed at: 08/23/2024 1509   Audit-C Score 0 Filed at: 08/23/2024 1509   MARIE: How many times in the past year have you...    Used an illegal drug or used a prescription medication for non-medical reasons? Never Filed at: 08/23/2024 1509                      Medical Decision Making  Problems Addressed:  Dysuria: chronic illness or injury    Amount and/or Complexity of Data Reviewed  Labs: ordered. Decision-making details documented in ED Course.  Discussion of management or test interpretation with external provider(s): No UTI       Risk  Prescription drug management.                 Disposition  Final diagnoses:   Dysuria     Time reflects when diagnosis was documented in both MDM as applicable and the Disposition within this note       Time User Action Codes Description Comment    8/23/2024  3:56 PM [place] Add [R30.0] Dysuria           ED Disposition       ED Disposition   Discharge    Condition   Stable    Date/Time   Fri  Aug 23, 2024 1556    Comment   Ben Rivas discharge to home/self care.                   Follow-up Information       Follow up With Specialties Details Why Contact Info    Landry Mart MD Internal Medicine, Hospice Services, Palliative Care In 3 days If symptoms worsen [address]  [address]  [place] [address]  [phone]              Discharge Medication List as of 8/23/2024  3:57 PM        START taking these medications    Details   fluconazole (DIFLUCAN) 150 mg tablet Take 1 tablet (150 mg total) by mouth once for 1 dose, Starting Fri 8/23/2024, Normal           CONTINUE these medications which have NOT CHANGED    Details   albuterol (PROVENTIL HFA,VENTOLIN HFA) 90 mcg/act inhaler Inhale, Starting Fri 5/16/2014, Historical Med      Bioflavonoid Products (Suyapa-C) 500-550 MG TABS Take 1,000 mg by mouth, Historical Med      Cholecalciferol 50 MCG (2000 UT) CAPS Take 3 capsules by mouth 60392 units daily liquid, Historical Med      clotrimazole-betamethasone (LOTRISONE) 1-0.05 % cream Apply topically 2 (two) times a day as needed (itching), Starting Mon 2/5/2024, Normal      erythromycin (ILOTYCIN) ophthalmic ointment Historical Med      Flarex 0.1 % ophthalmic suspension Historical Med      fluticasone (FLONASE) 50 mcg/act nasal spray USE 1 SPRAY IN EACH NOSTRIL TWICE A DAY WITH MEALS, Normal      lidocaine (XYLOCAINE) 2 % topical gel Apply topically as needed for mild pain, Starting Wed 6/19/2024, Normal      rizatriptan (MAXALT-MLT) 5 mg disintegrating tablet Take 1 tablet (5 mg total) by mouth once as needed for migraine for up to 1 dose may repeat in 2 hours if necessary, Starting Thu 6/29/2023, Normal                 PDMP Review       None            ED Provider  Electronically Signed by             [place] MD  08/23/24 6263

## 2024-09-10 ENCOUNTER — TELEPHONE (OUTPATIENT)
Age: 44
End: 2024-09-10

## 2024-09-12 ENCOUNTER — APPOINTMENT (OUTPATIENT)
Dept: LAB | Facility: CLINIC | Age: 44
End: 2024-09-12
Payer: COMMERCIAL

## 2024-09-12 DIAGNOSIS — Z00.00 WELL ADULT EXAM: ICD-10-CM

## 2024-09-12 LAB
ALBUMIN SERPL BCG-MCNC: 4.4 G/DL (ref 3.5–5)
ALP SERPL-CCNC: 51 U/L (ref 34–104)
ALT SERPL W P-5'-P-CCNC: 12 U/L (ref 7–52)
ANION GAP SERPL CALCULATED.3IONS-SCNC: 7 MMOL/L (ref 4–13)
AST SERPL W P-5'-P-CCNC: 15 U/L (ref 13–39)
BASOPHILS # BLD AUTO: 0.03 THOUSANDS/ΜL (ref 0–0.1)
BASOPHILS NFR BLD AUTO: 1 % (ref 0–1)
BILIRUB SERPL-MCNC: 0.51 MG/DL (ref 0.2–1)
BUN SERPL-MCNC: 15 MG/DL (ref 5–25)
CALCIUM SERPL-MCNC: 9.4 MG/DL (ref 8.4–10.2)
CHLORIDE SERPL-SCNC: 106 MMOL/L (ref 96–108)
CHOLEST SERPL-MCNC: 190 MG/DL
CO2 SERPL-SCNC: 27 MMOL/L (ref 21–32)
CREAT SERPL-MCNC: 0.75 MG/DL (ref 0.6–1.3)
EOSINOPHIL # BLD AUTO: 0.03 THOUSAND/ΜL (ref 0–0.61)
EOSINOPHIL NFR BLD AUTO: 1 % (ref 0–6)
ERYTHROCYTE [DISTWIDTH] IN BLOOD BY AUTOMATED COUNT: 13.5 % (ref 11.6–15.1)
EST. AVERAGE GLUCOSE BLD GHB EST-MCNC: 108 MG/DL
GFR SERPL CREATININE-BSD FRML MDRD: 98 ML/MIN/1.73SQ M
GLUCOSE P FAST SERPL-MCNC: 82 MG/DL (ref 65–99)
HBA1C MFR BLD: 5.4 %
HCT VFR BLD AUTO: 43.3 % (ref 34.8–46.1)
HDLC SERPL-MCNC: 61 MG/DL
HGB BLD-MCNC: 13.1 G/DL (ref 11.5–15.4)
IMM GRANULOCYTES # BLD AUTO: 0.01 THOUSAND/UL (ref 0–0.2)
IMM GRANULOCYTES NFR BLD AUTO: 0 % (ref 0–2)
LDLC SERPL CALC-MCNC: 116 MG/DL (ref 0–100)
LYMPHOCYTES # BLD AUTO: 1.95 THOUSANDS/ΜL (ref 0.6–4.47)
LYMPHOCYTES NFR BLD AUTO: 46 % (ref 14–44)
MCH RBC QN AUTO: 27.3 PG (ref 26.8–34.3)
MCHC RBC AUTO-ENTMCNC: 30.3 G/DL (ref 31.4–37.4)
MCV RBC AUTO: 90 FL (ref 82–98)
MONOCYTES # BLD AUTO: 0.29 THOUSAND/ΜL (ref 0.17–1.22)
MONOCYTES NFR BLD AUTO: 7 % (ref 4–12)
NEUTROPHILS # BLD AUTO: 1.94 THOUSANDS/ΜL (ref 1.85–7.62)
NEUTS SEG NFR BLD AUTO: 45 % (ref 43–75)
NONHDLC SERPL-MCNC: 129 MG/DL
NRBC BLD AUTO-RTO: 0 /100 WBCS
PLATELET # BLD AUTO: 277 THOUSANDS/UL (ref 149–390)
PMV BLD AUTO: 11.1 FL (ref 8.9–12.7)
POTASSIUM SERPL-SCNC: 3.8 MMOL/L (ref 3.5–5.3)
PROT SERPL-MCNC: 7 G/DL (ref 6.4–8.4)
RBC # BLD AUTO: 4.79 MILLION/UL (ref 3.81–5.12)
SODIUM SERPL-SCNC: 140 MMOL/L (ref 135–147)
TRIGL SERPL-MCNC: 66 MG/DL
TSH SERPL DL<=0.05 MIU/L-ACNC: 0.73 UIU/ML (ref 0.45–4.5)
URATE SERPL-MCNC: 2.5 MG/DL (ref 2–7.5)
WBC # BLD AUTO: 4.25 THOUSAND/UL (ref 4.31–10.16)

## 2024-09-12 PROCEDURE — 84443 ASSAY THYROID STIM HORMONE: CPT

## 2024-09-12 PROCEDURE — 85025 COMPLETE CBC W/AUTO DIFF WBC: CPT

## 2024-09-12 PROCEDURE — 84550 ASSAY OF BLOOD/URIC ACID: CPT

## 2024-09-12 PROCEDURE — 80061 LIPID PANEL: CPT

## 2024-09-12 PROCEDURE — 36415 COLL VENOUS BLD VENIPUNCTURE: CPT

## 2024-09-12 PROCEDURE — 80053 COMPREHEN METABOLIC PANEL: CPT

## 2024-09-12 PROCEDURE — 83036 HEMOGLOBIN GLYCOSYLATED A1C: CPT

## 2024-09-13 PROBLEM — N83.209 OVARIAN CYST: Status: RESOLVED | Noted: 2017-06-19 | Resolved: 2024-09-13

## 2024-09-13 PROBLEM — H69.90 EUSTACHIAN TUBE DYSFUNCTION: Status: RESOLVED | Noted: 2017-12-19 | Resolved: 2024-09-13

## 2024-09-13 PROBLEM — R35.0 INCREASED FREQUENCY OF URINATION: Status: RESOLVED | Noted: 2023-04-11 | Resolved: 2024-09-13

## 2024-09-13 PROBLEM — R10.2 SUPRAPUBIC PAIN: Status: RESOLVED | Noted: 2024-08-19 | Resolved: 2024-09-13

## 2024-09-13 PROBLEM — N92.6 IRREGULAR PERIODS: Status: RESOLVED | Noted: 2021-11-11 | Resolved: 2024-09-13

## 2024-09-13 PROBLEM — N94.89 VAGINAL BURNING: Status: RESOLVED | Noted: 2017-06-19 | Resolved: 2024-09-13

## 2024-09-13 PROBLEM — Z87.410 HISTORY OF CERVICAL DYSPLASIA: Status: RESOLVED | Noted: 2020-03-02 | Resolved: 2024-09-13

## 2024-09-13 PROBLEM — N94.9 VAGINAL BURNING: Status: RESOLVED | Noted: 2017-06-19 | Resolved: 2024-09-13

## 2024-09-13 PROBLEM — M62.89 PELVIC FLOOR TENSION: Status: RESOLVED | Noted: 2022-11-21 | Resolved: 2024-09-13

## 2024-09-13 PROBLEM — Z31.69 ENCOUNTER FOR PRECONCEPTION CONSULTATION: Status: RESOLVED | Noted: 2022-02-01 | Resolved: 2024-09-13

## 2024-09-13 PROBLEM — R10.2 PERINEAL PAIN: Status: RESOLVED | Noted: 2023-02-07 | Resolved: 2024-09-13

## 2024-09-16 ENCOUNTER — OFFICE VISIT (OUTPATIENT)
Age: 44
End: 2024-09-16
Payer: COMMERCIAL

## 2024-09-16 VITALS
HEART RATE: 119 BPM | WEIGHT: 120 LBS | SYSTOLIC BLOOD PRESSURE: 122 MMHG | DIASTOLIC BLOOD PRESSURE: 80 MMHG | OXYGEN SATURATION: 99 % | TEMPERATURE: 97.8 F | BODY MASS INDEX: 20.49 KG/M2 | HEIGHT: 64 IN

## 2024-09-16 DIAGNOSIS — E78.2 MIXED HYPERLIPIDEMIA: ICD-10-CM

## 2024-09-16 DIAGNOSIS — F41.1 ANXIETY STATE: ICD-10-CM

## 2024-09-16 DIAGNOSIS — D72.819 LEUKOPENIA, UNSPECIFIED TYPE: ICD-10-CM

## 2024-09-16 DIAGNOSIS — K58.1 IRRITABLE BOWEL SYNDROME WITH CONSTIPATION: ICD-10-CM

## 2024-09-16 DIAGNOSIS — N30.10 INTERSTITIAL CYSTITIS: ICD-10-CM

## 2024-09-16 DIAGNOSIS — Z12.31 ENCOUNTER FOR SCREENING MAMMOGRAM FOR BREAST CANCER: ICD-10-CM

## 2024-09-16 DIAGNOSIS — J45.20 MILD INTERMITTENT EXTRINSIC ASTHMA WITHOUT COMPLICATION: ICD-10-CM

## 2024-09-16 DIAGNOSIS — D50.9 IRON DEFICIENCY ANEMIA, UNSPECIFIED IRON DEFICIENCY ANEMIA TYPE: ICD-10-CM

## 2024-09-16 DIAGNOSIS — Z00.00 ANNUAL PHYSICAL EXAM: Primary | ICD-10-CM

## 2024-09-16 PROCEDURE — 99396 PREV VISIT EST AGE 40-64: CPT

## 2024-09-16 NOTE — PROGRESS NOTES
INTERNAL MEDICINE FOLLOW-UP VISIT  [facility] - [facility] INTERNAL MEDICINE [address]    NAME: Ben Rivas  AGE: 43 y.o. SEX: female  : 1980     DATE: 2024     Assessment and Plan:   1. Annual physical exam  Recommended eating a well-balanced diet of fruits, veggies, and lean meats. She drinks an adequate amount of water. Recommended exercising 30 mins 5x a week. She sleeps well. She denies any alcohol, tobacco, or illicit drug use. She is UTD with dentist and eye doctor.  She does not currently have a job.    2. Mild intermittent extrinsic asthma without complication  She denies any SOB, coughing, or wheezing.  She uses her albuterol inhaler maybe once a year.    3. Irritable bowel syndrome with constipation  She is increasing her fiber intake via diet.  She uses MiraLAX as needed.    4. Interstitial cystitis  She is scheduled with urology.     5. Anxiety state  She has healthy coping mechanisms like meditation and EDMR.    6. Mixed hyperlipidemia  LDL is 116. Reviewed a low cholesterol diet. Current ASCVD risk score is 1.2%.    7. Encounter for screening mammogram for breast cancer  Due for mammogram.    8. Leukopenia, unspecified type  White blood cells were 4.25, will repeat CBC in 1 week.      Depression Screening and Follow-up Plan: Patient was screened for depression during today's encounter. They screened negative with a PHQ-2 score of 0.     No follow-ups on file.       Chief Complaint:     Chief Complaint   Patient presents with    Physical Exam      History of Present Illness:   Patient is a 43-year-old female that presents today for her annual physical exam.  She has no new complaints today.    Health maintenance:  Up-to-date on labs, due for mammogram.  Family history of hyperlipidemia, hypertension, obesity, type 2 diabetes, heart disease, and asthma.    The following portions of the patient's history were reviewed and updated as appropriate: allergies, current  medications, past family history, past medical history, past social history, past surgical history and problem list.     Review of Systems:     Review of Systems   Constitutional:  Positive for fatigue. Negative for chills and fever.   HENT:  Negative for ear discharge, ear pain, postnasal drip, rhinorrhea, sinus pressure, sinus pain, sore throat, tinnitus and trouble swallowing.    Eyes:  Negative for pain, discharge and itching.   Respiratory:  Negative for cough, shortness of breath and wheezing.    Cardiovascular:  Negative for chest pain, palpitations and leg swelling.   Gastrointestinal:  Negative for abdominal pain, constipation, diarrhea, nausea and vomiting.   Endocrine: Negative for polydipsia, polyphagia and polyuria.   Genitourinary:  Negative for difficulty urinating, frequency, hematuria and urgency.   Musculoskeletal:  Negative for arthralgias, joint swelling and myalgias.   Skin:  Negative for color change.   Allergic/Immunologic: Negative for environmental allergies.   Neurological:  Negative for dizziness, weakness, light-headedness, numbness and headaches.   Hematological:  Negative for adenopathy.   Psychiatric/Behavioral:  Negative for decreased concentration and sleep disturbance. The patient is not nervous/anxious.         Past Medical History:     Past Medical History:   Diagnosis Date    Acne 12/14/2015    Allergic     Anxiety     Asthma     Depression     Eustachian tube dysfunction 12/19/2017    External hemorrhoids 11/25/2014    Femoral hernia with obstruction     Gonorrhea 11/15/2021    Headache(784.0) 11/10/2023    Hemorrhoids 12/14/2015    History of cervical dysplasia 03/02/2020    Increased frequency of urination 04/11/2023    Irregular menses     Irregular periods 11/11/2021    Joint pain 05/16/2014    Malaise and fatigue     Neurogenic pain     Ovarian cyst 06/19/2017    Pelvic floor tension 11/21/2022    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HCC)     Sleep apnea     Thymus disorder  "([facility])     last assessed 4/17/14; resolved 7/2/15    Vaginal burning 06/19/2017        Current Medications:     Current Outpatient Medications:     albuterol (PROVENTIL HFA,VENTOLIN HFA) 90 mcg/act inhaler, Inhale, Disp: , Rfl:     Bioflavonoid Products (Suyapa-C) 500-550 MG TABS, Take 1,000 mg by mouth, Disp: , Rfl:     Cholecalciferol 50 MCG (2000 UT) CAPS, Take 3 capsules by mouth 50280 units daily liquid, Disp: , Rfl:     clotrimazole-betamethasone (LOTRISONE) 1-0.05 % cream, Apply topically 2 (two) times a day as needed (itching), Disp: 30 g, Rfl: 0    erythromycin (ILOTYCIN) ophthalmic ointment, , Disp: , Rfl:     Flarex 0.1 % ophthalmic suspension, , Disp: , Rfl:     fluticasone (FLONASE) 50 mcg/act nasal spray, USE 1 SPRAY IN EACH NOSTRIL TWICE A DAY WITH MEALS, Disp: 16 mL, Rfl: 3    lidocaine (XYLOCAINE) 2 % topical gel, Apply topically as needed for mild pain, Disp: 2 mL, Rfl: 2    rizatriptan (MAXALT-MLT) 5 mg disintegrating tablet, Take 1 tablet (5 mg total) by mouth once as needed for migraine for up to 1 dose may repeat in 2 hours if necessary, Disp: 10 tablet, Rfl: 5     Allergies:   No Known Allergies     Physical Exam:     /80 (BP Location: Left arm, Patient Position: Sitting, Cuff Size: Standard)   Pulse (!) 119   Temp 97.8 °F (36.6 °C) (Temporal)   Ht 5' 4\" (1.626 m)   Wt 54.4 kg (120 lb)   SpO2 99%   BMI 20.60 kg/m²     Physical Exam  Vitals and nursing note reviewed.   Constitutional:       General: She is awake. She is not in acute distress.     Appearance: Normal appearance. She is well-developed, well-groomed and normal weight.   HENT:      Head: Normocephalic and atraumatic.      Right Ear: Hearing and external ear normal.      Left Ear: Hearing and external ear normal.      Nose: Nose normal.      Mouth/Throat:      Lips: Pink.      Mouth: Mucous membranes are moist.   Eyes:      General: Lids are normal. Vision grossly intact. Gaze aligned appropriately.      Conjunctiva/sclera: " Conjunctivae normal.   Neck:      Vascular: No carotid bruit.      Trachea: Trachea and phonation normal.   Cardiovascular:      Rate and Rhythm: Normal rate and regular rhythm.      Heart sounds: Normal heart sounds, S1 normal and S2 normal. No murmur heard.     No friction rub. No gallop.   Pulmonary:      Effort: Pulmonary effort is normal. No respiratory distress.      Breath sounds: Normal breath sounds and air entry. No decreased breath sounds, wheezing, rhonchi or rales.   Abdominal:      General: Abdomen is flat.   Musculoskeletal:         General: No swelling.      Cervical back: Neck supple.      Right lower leg: No edema.      Left lower leg: No edema.   Skin:     General: Skin is warm.      Capillary Refill: Capillary refill takes less than 2 seconds.   Neurological:      Mental Status: She is alert.   Psychiatric:         Attention and Perception: Attention and perception normal.         Mood and Affect: Mood and affect normal.         Speech: Speech normal.         Behavior: Behavior normal. Behavior is cooperative.         Thought Content: Thought content normal.         Cognition and Memory: Cognition and memory normal.         Judgment: Judgment normal.           Data:     Laboratory Results: I have personally reviewed the pertinent laboratory results/reports   Radiology/Other Diagnostic Testing Results: No pertinent imaging studies reviewed.    Amy Little PA-C  [facility] INTERNAL MEDICINE [address]

## 2024-09-23 ENCOUNTER — APPOINTMENT (OUTPATIENT)
Dept: LAB | Facility: CLINIC | Age: 44
End: 2024-09-23
Payer: COMMERCIAL

## 2024-09-23 DIAGNOSIS — D72.819 LEUKOPENIA, UNSPECIFIED TYPE: ICD-10-CM

## 2024-09-23 DIAGNOSIS — D50.9 IRON DEFICIENCY ANEMIA, UNSPECIFIED IRON DEFICIENCY ANEMIA TYPE: ICD-10-CM

## 2024-09-23 LAB
BASOPHILS # BLD AUTO: 0.04 THOUSANDS/ΜL (ref 0–0.1)
BASOPHILS NFR BLD AUTO: 1 % (ref 0–1)
EOSINOPHIL # BLD AUTO: 0.04 THOUSAND/ΜL (ref 0–0.61)
EOSINOPHIL NFR BLD AUTO: 1 % (ref 0–6)
ERYTHROCYTE [DISTWIDTH] IN BLOOD BY AUTOMATED COUNT: 13.8 % (ref 11.6–15.1)
FERRITIN SERPL-MCNC: 20 NG/ML (ref 11–307)
HCT VFR BLD AUTO: 45 % (ref 34.8–46.1)
HGB BLD-MCNC: 14.5 G/DL (ref 11.5–15.4)
IMM GRANULOCYTES # BLD AUTO: 0.01 THOUSAND/UL (ref 0–0.2)
IMM GRANULOCYTES NFR BLD AUTO: 0 % (ref 0–2)
IRON SATN MFR SERPL: 17 % (ref 15–50)
IRON SERPL-MCNC: 58 UG/DL (ref 50–212)
LYMPHOCYTES # BLD AUTO: 2.15 THOUSANDS/ΜL (ref 0.6–4.47)
LYMPHOCYTES NFR BLD AUTO: 40 % (ref 14–44)
MCH RBC QN AUTO: 28.2 PG (ref 26.8–34.3)
MCHC RBC AUTO-ENTMCNC: 32.2 G/DL (ref 31.4–37.4)
MCV RBC AUTO: 88 FL (ref 82–98)
MONOCYTES # BLD AUTO: 0.39 THOUSAND/ΜL (ref 0.17–1.22)
MONOCYTES NFR BLD AUTO: 7 % (ref 4–12)
NEUTROPHILS # BLD AUTO: 2.69 THOUSANDS/ΜL (ref 1.85–7.62)
NEUTS SEG NFR BLD AUTO: 51 % (ref 43–75)
NRBC BLD AUTO-RTO: 0 /100 WBCS
PLATELET # BLD AUTO: 276 THOUSANDS/UL (ref 149–390)
PMV BLD AUTO: 11.7 FL (ref 8.9–12.7)
RBC # BLD AUTO: 5.14 MILLION/UL (ref 3.81–5.12)
TIBC SERPL-MCNC: 339 UG/DL (ref 250–450)
UIBC SERPL-MCNC: 281 UG/DL (ref 155–355)
WBC # BLD AUTO: 5.32 THOUSAND/UL (ref 4.31–10.16)

## 2024-09-23 PROCEDURE — 82728 ASSAY OF FERRITIN: CPT

## 2024-09-23 PROCEDURE — 85025 COMPLETE CBC W/AUTO DIFF WBC: CPT

## 2024-09-23 PROCEDURE — 36415 COLL VENOUS BLD VENIPUNCTURE: CPT

## 2024-09-23 PROCEDURE — 83540 ASSAY OF IRON: CPT

## 2024-09-23 PROCEDURE — 83550 IRON BINDING TEST: CPT

## 2024-09-24 ENCOUNTER — TELEPHONE (OUTPATIENT)
Age: 44
End: 2024-09-24

## 2024-09-24 NOTE — TELEPHONE ENCOUNTER
----- Message from Amy Little PA-C sent at 9/24/2024  7:29 AM EDT -----  White blood cells are back to normal and iron is also normal.

## 2024-09-30 DIAGNOSIS — R29.898 WEAKNESS OF LEFT LEG: Primary | ICD-10-CM

## 2024-09-30 NOTE — PROGRESS NOTES
1. Weakness of left leg    - EMG 1 Limb; Future        Adrienne Velásquez MD.   Staff Neurologist  09/30/24   2:05 PM

## 2024-10-01 ENCOUNTER — PROCEDURE VISIT (OUTPATIENT)
Dept: NEUROLOGY | Facility: CLINIC | Age: 44
End: 2024-10-01
Payer: COMMERCIAL

## 2024-10-01 DIAGNOSIS — R29.898 WEAKNESS OF LEFT LEG: ICD-10-CM

## 2024-10-01 PROCEDURE — 95909 NRV CNDJ TST 5-6 STUDIES: CPT | Performed by: PHYSICAL MEDICINE & REHABILITATION

## 2024-10-01 PROCEDURE — 95886 MUSC TEST DONE W/N TEST COMP: CPT | Performed by: PHYSICAL MEDICINE & REHABILITATION

## 2024-10-02 ENCOUNTER — TELEPHONE (OUTPATIENT)
Dept: UROLOGY | Facility: CLINIC | Age: 44
End: 2024-10-02

## 2024-10-02 ENCOUNTER — OFFICE VISIT (OUTPATIENT)
Dept: UROLOGY | Facility: CLINIC | Age: 44
End: 2024-10-02
Payer: COMMERCIAL

## 2024-10-02 VITALS
HEART RATE: 85 BPM | BODY MASS INDEX: 21.17 KG/M2 | TEMPERATURE: 98.7 F | SYSTOLIC BLOOD PRESSURE: 116 MMHG | WEIGHT: 124 LBS | OXYGEN SATURATION: 99 % | DIASTOLIC BLOOD PRESSURE: 76 MMHG | HEIGHT: 64 IN

## 2024-10-02 DIAGNOSIS — R30.0 DYSURIA: ICD-10-CM

## 2024-10-02 DIAGNOSIS — R31.0 GROSS HEMATURIA: ICD-10-CM

## 2024-10-02 DIAGNOSIS — N39.0 URINARY TRACT INFECTION WITHOUT HEMATURIA, SITE UNSPECIFIED: Primary | ICD-10-CM

## 2024-10-02 DIAGNOSIS — R10.2 PELVIC AND PERINEAL PAIN: ICD-10-CM

## 2024-10-02 LAB
BACTERIA UR QL AUTO: ABNORMAL /HPF
BILIRUB UR QL STRIP: NEGATIVE
CLARITY UR: CLEAR
COLOR UR: ABNORMAL
GLUCOSE UR STRIP-MCNC: NEGATIVE MG/DL
HGB UR QL STRIP.AUTO: NEGATIVE
KETONES UR STRIP-MCNC: ABNORMAL MG/DL
LEUKOCYTE ESTERASE UR QL STRIP: NEGATIVE
MUCOUS THREADS UR QL AUTO: ABNORMAL
NITRITE UR QL STRIP: NEGATIVE
NON-SQ EPI CELLS URNS QL MICRO: ABNORMAL /HPF
PH UR STRIP.AUTO: 6.5 [PH]
PROT UR STRIP-MCNC: ABNORMAL MG/DL
RBC #/AREA URNS AUTO: ABNORMAL /HPF
SL AMB  POCT GLUCOSE, UA: NORMAL
SL AMB LEUKOCYTE ESTERASE,UA: NORMAL
SL AMB POCT BILIRUBIN,UA: NORMAL
SL AMB POCT BLOOD,UA: NORMAL
SL AMB POCT CLARITY,UA: CLEAR
SL AMB POCT COLOR,UA: YELLOW
SL AMB POCT KETONES,UA: NORMAL
SL AMB POCT NITRITE,UA: NORMAL
SL AMB POCT PH,UA: 5
SL AMB POCT SPECIFIC GRAVITY,UA: 1.01
SL AMB POCT URINE PROTEIN: NORMAL
SL AMB POCT UROBILINOGEN: 0.2
SP GR UR STRIP.AUTO: 1.02 (ref 1–1.03)
UROBILINOGEN UR STRIP-ACNC: <2 MG/DL
WBC #/AREA URNS AUTO: ABNORMAL /HPF

## 2024-10-02 PROCEDURE — 99204 OFFICE O/P NEW MOD 45 MIN: CPT | Performed by: PHYSICIAN ASSISTANT

## 2024-10-02 PROCEDURE — 81002 URINALYSIS NONAUTO W/O SCOPE: CPT | Performed by: PHYSICIAN ASSISTANT

## 2024-10-02 PROCEDURE — 88112 CYTOPATH CELL ENHANCE TECH: CPT | Performed by: PATHOLOGY

## 2024-10-02 PROCEDURE — 81001 URINALYSIS AUTO W/SCOPE: CPT | Performed by: PHYSICIAN ASSISTANT

## 2024-10-02 RX ORDER — FLUCONAZOLE 150 MG/1
150 TABLET ORAL ONCE
COMMUNITY
Start: 2024-08-23

## 2024-10-02 RX ORDER — HYDROXYZINE HYDROCHLORIDE 10 MG/1
10 TABLET, FILM COATED ORAL
Qty: 90 TABLET | Refills: 3 | Status: SHIPPED | OUTPATIENT
Start: 2024-10-02

## 2024-10-02 NOTE — PROGRESS NOTES
10/2/2024      No chief complaint on file.        Assessment and Plan    44 y.o. female -- New patient    1. Dysuria  2. Pelvic pain  - UA today negative for infection or blood  -Reviewed conservative measures  - previously tried pelvic floor PT  - Trial of hydroxyzine    3. History of gross hematuria  -Recommend full workup with BMP, CT renal protocol, urine cytology, and return for cystoscopy  - Call with any questions or concerns in the meantime  - All questions answered; patient understands and agrees with plan       History of Present Illness  Ben Rivas is a 44 y.o. female new patient here for evaluation of urinary symptoms.    Denies seeing urology in the past.  Patient has longstanding history of GI and urinary symptoms.  She previously saw urogynecology who performed straight catheterized sample which was negative for infection.  Recommendations by urogynecology was for IC diet for urinary symptoms with only mild symptom improvement.  Patient does have pelvic pain as well as dysuria saw urogyn previously.  Patient previously saw pelvic floor PT with minimal benefit.  States that she has had gross hematuria in the past and believes this may have been due to an infection.  Denies prior  surgical manipulation.  Denies family history of  malignancies.    Review of Systems   Constitutional:  Negative for activity change, appetite change, chills and fever.   HENT:  Negative for congestion and trouble swallowing.    Respiratory:  Negative for cough and shortness of breath.    Cardiovascular:  Negative for chest pain, palpitations and leg swelling.   Gastrointestinal:  Negative for abdominal pain, constipation, diarrhea, nausea and vomiting.   Genitourinary:  Positive for dysuria and pelvic pain. Negative for difficulty urinating, flank pain, frequency, hematuria and urgency.   Musculoskeletal:  Negative for back pain and gait problem.   Skin:  Negative for wound.   Allergic/Immunologic: Negative for  "immunocompromised state.   Neurological:  Negative for dizziness and syncope.   Hematological:  Does not bruise/bleed easily.   Psychiatric/Behavioral:  Negative for confusion.    All other systems reviewed and are negative.       Vitals  Vitals:    10/02/24 1328   BP: 116/76   Pulse: 85   Temp: 98.7 °F (37.1 °C)   TempSrc: Temporal   SpO2: 99%   Weight: 56.2 kg (124 lb)   Height: 5' 4\" (1.626 m)       Physical Exam  Constitutional:       General: She is not in acute distress.     Appearance: Normal appearance. She is not ill-appearing, toxic-appearing or diaphoretic.   HENT:      Head: Normocephalic.      Nose: No congestion.   Eyes:      General: No scleral icterus.        Right eye: No discharge.         Left eye: No discharge.      Conjunctiva/sclera: Conjunctivae normal.      Pupils: Pupils are equal, round, and reactive to light.   Pulmonary:      Effort: Pulmonary effort is normal.   Musculoskeletal:      Cervical back: Normal range of motion.   Skin:     General: Skin is warm and dry.      Coloration: Skin is not jaundiced or pale.      Findings: No bruising, erythema, lesion or rash.   Neurological:      General: No focal deficit present.      Mental Status: She is alert and oriented to person, place, and time. Mental status is at baseline.      Gait: Gait normal.   Psychiatric:         Mood and Affect: Mood normal.         Behavior: Behavior normal.         Thought Content: Thought content normal.         Judgment: Judgment normal.           Past History  Past Medical History:   Diagnosis Date    Acne 12/14/2015    Allergic     Anxiety     Asthma     Depression     Eustachian tube dysfunction 12/19/2017    External hemorrhoids 11/25/2014    Femoral hernia with obstruction     Gonorrhea 11/15/2021    Headache(784.0) 11/10/2023    Hemorrhoids 12/14/2015    History of cervical dysplasia 03/02/2020    Increased frequency of urination 04/11/2023    Irregular menses     Irregular periods 11/11/2021    Joint pain " 05/16/2014    Malaise and fatigue     Neurogenic pain     Ovarian cyst 06/19/2017    Pelvic floor tension 11/21/2022    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HCC)     Sleep apnea     Thymus disorder (HCC)     last assessed 4/17/14; resolved 7/2/15    Vaginal burning 06/19/2017     Social History     Socioeconomic History    Marital status: Single     Spouse name: None    Number of children: None    Years of education: None    Highest education level: None   Occupational History    Occupation: marketing    Tobacco Use    Smoking status: Never     Passive exposure: Past    Smokeless tobacco: Never   Vaping Use    Vaping status: Never Used   Substance and Sexual Activity    Alcohol use: No    Drug use: No    Sexual activity: Yes     Partners: Male     Birth control/protection: Condom Male   Other Topics Concern    None   Social History Narrative    Lives with parents ()     Social Determinants of Health     Financial Resource Strain: Not on file   Food Insecurity: Not on file   Transportation Needs: Not on file   Physical Activity: Insufficiently Active (9/21/2022)    Exercise Vital Sign     Days of Exercise per Week: 3 days     Minutes of Exercise per Session: 20 min   Stress: No Stress Concern Present (11/23/2020)    [employer] - Occupational Stress Questionnaire     Feeling of Stress : Not at all   Social Connections: Unknown (6/18/2024)    Received from [email or web address]    [email or web address]     How often do you feel lonely or isolated from those around you? (Adult - for ages 18 years and over): Not on file   Intimate Partner Violence: Not on file   Housing Stability: Not on file     Social History     Tobacco Use   Smoking Status Never    Passive exposure: Past   Smokeless Tobacco Never     Family History   Problem Relation Age of Onset    Asthma Mother     Hyperlipidemia Mother     Hypertension Mother     Obesity Mother     Hyperlipidemia Father     Hypertension Father     Diabetes type  "II Father     Heart disease Father         valvular       The following portions of the patient's history were reviewed and updated as appropriate: allergies, current medications, past medical history, past social history, past surgical history and problem list.    Results  No results found for this or any previous visit (from the past 1 hour(s)).  ]  No results found for: \"PSA\"  Lab Results   Component Value Date    GLUCOSE 76 12/09/2015    CALCIUM 9.4 09/12/2024     12/09/2015    K 3.8 09/12/2024    CO2 27 09/12/2024     09/12/2024    BUN 15 09/12/2024    CREATININE 0.75 09/12/2024     Lab Results   Component Value Date    WBC 5.32 09/23/2024    HGB 14.5 09/23/2024    HCT 45.0 09/23/2024    MCV 88 09/23/2024     09/23/2024       Lakisha Collins PA-C  "

## 2024-10-03 ENCOUNTER — PATIENT MESSAGE (OUTPATIENT)
Age: 44
End: 2024-10-03

## 2024-10-03 DIAGNOSIS — M54.12 CERVICAL RADICULOPATHY: ICD-10-CM

## 2024-10-03 DIAGNOSIS — R29.898 WEAKNESS OF LEFT LEG: Primary | ICD-10-CM

## 2024-10-03 DIAGNOSIS — R29.898 LEFT ARM WEAKNESS: ICD-10-CM

## 2024-10-03 NOTE — PATIENT COMMUNICATION
Dr. Velásquez: please advise. Pt had CECY Left leg done 10/1/24 and LOV 9/16/24        EMG Results  Impression: Normal study. 1.There is no electrophysiologic evidence of a peroneal or tibial neuropathy. 2. There is no electrophysiologic evidence of a lumbosacral radiculopathy

## 2024-10-04 NOTE — RESULT ENCOUNTER NOTE
EMG test results were reviewed (normal EMG), communicated with the patient through my chart message.

## 2024-10-07 PROCEDURE — 88112 CYTOPATH CELL ENHANCE TECH: CPT | Performed by: PATHOLOGY

## 2024-10-21 ENCOUNTER — TELEMEDICINE (OUTPATIENT)
Age: 44
End: 2024-10-21
Payer: COMMERCIAL

## 2024-10-21 ENCOUNTER — TELEPHONE (OUTPATIENT)
Age: 44
End: 2024-10-21

## 2024-10-21 VITALS — HEIGHT: 64 IN | BODY MASS INDEX: 21.17 KG/M2 | WEIGHT: 124 LBS

## 2024-10-21 DIAGNOSIS — M54.12 CERVICAL RADICULOPATHY: ICD-10-CM

## 2024-10-21 DIAGNOSIS — G43.109 MIGRAINE WITH AURA AND WITHOUT STATUS MIGRAINOSUS, NOT INTRACTABLE: Primary | ICD-10-CM

## 2024-10-21 DIAGNOSIS — R29.898 LEFT ARM WEAKNESS: ICD-10-CM

## 2024-10-21 PROCEDURE — 99215 OFFICE O/P EST HI 40 MIN: CPT | Performed by: PSYCHIATRY & NEUROLOGY

## 2024-10-21 NOTE — TELEPHONE ENCOUNTER
Called patient to check in for virtual visit. Received no answer 10 min before appointment time. Left message on voice mail to log into visit as soon as possible.

## 2024-10-21 NOTE — PATIENT INSTRUCTIONS
Vitamins for headache  Mg 400 mg daily for headache  RiboFlavin 400 mg daily for headache        Lifestyle adjustments. Irrespective of treatment modalities applied, trigger control and lifestyle modification are indispensable to the successful management of migraine. This is especially important in children, adolescents, or pregnant women where drug treatments must be especially limited.    Although there is no robust evidence for most of the recommendations, most are general health measures that, given the lack of adverse effects and the benefit for general well-being, we consider should be recommended in all patients.    Avoid triggers. Many patients attribute the onset or worsening of pain to specific triggers such as stress, sleep changes, food, or atmospheric changes, among others. It is even possible that the relationship occurs inversely, so that premonitory symptoms such as sleep disturbances and appetite 48 to 72 hours before the onset of pain can be misinterpreted by the patient as the trigger of migraine attacks. The therapeutic implications of this relationship are also unclear. There are possible triggers such as sleep deprivation, fasting, or certain foods that can be easily avoidable. But avoiding other triggers can lead to very restrictive lifestyles with a reduction in quality of life that does not outweigh the potential beneficial.    Sleep. Another complex relationship is headache and sleep. An excess or lack of sleep can trigger migraine attacks and at the same time rest is one of the most used treatments to improve the symptoms of the migraine attack. Additionally, migraine and other headaches occur comorbidly with sleep disorders. Patients with chronic migraine have a higher prevalence of sleep disorders, specifically poor sleep habits and nonrestorative rest.    Regarding general sleep measures for patients with headache, it is recommended to define regular sleep schedules that allow 8 hours  of rest per day, insisting that they remain constant also during the weekend; have dinner 4 hours before bedtime and avoid liquids in the last two hours; and eliminate naps and avoid using screens, television, reading, or listening to music in bed. A nonpharmacological intervention to improve sleep habits can improve headache frequency and even reverse chronic to episodic migraine.    Diet. In the scientific literature, but especially in the informative websites and magazines, multiple and varied diets are proposed that aim to reduce the frequency of headaches. There are two main approaches: elimination diets, which consist of suppressing potentially triggering foods such as chocolate, alcohol, cheese, nuts, or citrus fruits and diets that provide high or low amounts of certain components, ie, rich in vitamin B12, B6, or D or low in histamine, lactose, or fatty acids. The studies are not very rigorous and most do not have a control group (). In addition, it must be considered that food triggers were only associated with onset of headache in less than 10% of the participants.    Dietary recommendations for patients with migraine should be the same as for the general population with special emphasis on the prevention of obesity, which is a factor related to headache chronification. It is recommendable to have a varied diet, eating five meals a day to avoid periods of prolonged fasting and incorporating water intake to reach around 2.5 liters per day, which should be increased in case of physical activity or increase in temperature or humidity. Specific diets should be recommended solely based on whether there are other comorbidities in the patient.    Caffeine at moderate doses (< 400mg/day: equivalent to two cups of coffee) does not seem to have a negative effect on headache frequency although it should be taken regularly to avoid withdrawal headaches.    Although patients with migraine headaches and cluster  headaches may be more susceptible to alcohol as a precipitant, there is no evidence to recommend abstinence from alcohol in all patients. Individual predisposition and cultural factors must be considered.    Exercise. Aerobic exercise can prevent or reduce symptoms of multiple chronic diseases, including headache. With some methodological limitations, there are studies that demonstrate benefits of aerobic exercise as a therapeutic intervention to reduce the frequency and intensity of headaches, as well as the quality of life measured by questionnaires. Exercise can have a beneficial effect on headaches directly but also indirectly, improving sleep quality, mood, cardiovascular function, and preventing weight gain. In addition, it can improve the control of other diseases frequently comorbid with headache such as obesity, hypertension, anxiety, depression, or sleep disorders (). The clinical benefit of yoga as an add-on therapy in patients with episodic migraine has been demonstrated.

## 2024-10-21 NOTE — TELEPHONE ENCOUNTER
Called patient after virtual visit was complete to check out and schedule follow up 6 month visit. Received no answer left message on voice mail to call back and schedule follow up.
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
## 2024-10-24 ENCOUNTER — HOSPITAL ENCOUNTER (OUTPATIENT)
Dept: CT IMAGING | Facility: HOSPITAL | Age: 44
End: 2024-10-24
Payer: COMMERCIAL

## 2024-10-24 DIAGNOSIS — R31.0 GROSS HEMATURIA: ICD-10-CM

## 2024-10-24 PROCEDURE — 74178 CT ABD&PLV WO CNTR FLWD CNTR: CPT

## 2024-10-24 RX ADMIN — IOHEXOL 120 ML: 350 INJECTION, SOLUTION INTRAVENOUS at 13:19

## 2024-10-25 NOTE — TELEPHONE ENCOUNTER
I Faxed clinical around 9:40AM today- check back in 2 hrs with [employer]  T#: [id number]  STAT CT Abd/Pelvis   Site: [facility] spoke with Dr Shalini Ortega to get the office note done Yes...

## 2024-10-29 ENCOUNTER — TELEPHONE (OUTPATIENT)
Age: 44
End: 2024-10-29

## 2024-10-29 NOTE — TELEPHONE ENCOUNTER
Printed after visit summary.  LVMM for patient that AVS should be available in her MyChart now, but I have also placed a copy in the mail to her.

## 2024-10-29 NOTE — TELEPHONE ENCOUNTER
Pt called in asking for her after visit summary for her appt on 10/21. She is unable to pull it up on [employer].   If there is a way to make it available on [employer] please let pt know. If not can you please mail her the after visit summary for her records and call her to let her know it was mailed.     Thank you.

## 2024-11-06 ENCOUNTER — HOSPITAL ENCOUNTER (OUTPATIENT)
Dept: MRI IMAGING | Facility: CLINIC | Age: 44
Discharge: HOME | End: 2024-11-06
Payer: COMMERCIAL

## 2024-11-06 DIAGNOSIS — M54.12 CERVICAL RADICULOPATHY: ICD-10-CM

## 2024-11-06 DIAGNOSIS — R29.898 WEAKNESS OF LEFT LEG: ICD-10-CM

## 2024-11-06 DIAGNOSIS — R29.898 LEFT ARM WEAKNESS: ICD-10-CM

## 2024-11-06 PROCEDURE — A9585 GADOBUTROL INJECTION: HCPCS | Performed by: PSYCHIATRY & NEUROLOGY

## 2024-11-06 PROCEDURE — 72156 MRI NECK SPINE W/O & W/DYE: CPT

## 2024-11-06 RX ORDER — GADOBUTROL 604.72 MG/ML
5 INJECTION INTRAVENOUS
Status: COMPLETED | OUTPATIENT
Start: 2024-11-06 | End: 2024-11-06

## 2024-11-06 RX ADMIN — GADOBUTROL 5 ML: 604.72 INJECTION INTRAVENOUS at 13:32

## 2024-11-13 ENCOUNTER — RESULTS FOLLOW-UP (OUTPATIENT)
Age: 44
End: 2024-11-13

## 2024-11-14 ENCOUNTER — PROCEDURE VISIT (OUTPATIENT)
Dept: UROLOGY | Facility: CLINIC | Age: 44
End: 2024-11-14
Payer: COMMERCIAL

## 2024-11-14 VITALS
WEIGHT: 120 LBS | OXYGEN SATURATION: 100 % | HEIGHT: 64 IN | DIASTOLIC BLOOD PRESSURE: 76 MMHG | SYSTOLIC BLOOD PRESSURE: 118 MMHG | TEMPERATURE: 98 F | BODY MASS INDEX: 20.49 KG/M2 | HEART RATE: 106 BPM

## 2024-11-14 DIAGNOSIS — N39.0 URINARY TRACT INFECTION WITHOUT HEMATURIA, SITE UNSPECIFIED: Primary | ICD-10-CM

## 2024-11-14 DIAGNOSIS — R31.0 GROSS HEMATURIA: ICD-10-CM

## 2024-11-14 LAB
SL AMB  POCT GLUCOSE, UA: NORMAL
SL AMB LEUKOCYTE ESTERASE,UA: NORMAL
SL AMB POCT BILIRUBIN,UA: NORMAL
SL AMB POCT BLOOD,UA: NORMAL
SL AMB POCT CLARITY,UA: CLEAR
SL AMB POCT COLOR,UA: YELLOW
SL AMB POCT KETONES,UA: NORMAL
SL AMB POCT NITRITE,UA: NORMAL
SL AMB POCT PH,UA: 5
SL AMB POCT SPECIFIC GRAVITY,UA: 1.02
SL AMB POCT URINE PROTEIN: 0.15
SL AMB POCT UROBILINOGEN: 0.2

## 2024-11-14 PROCEDURE — 52000 CYSTOURETHROSCOPY: CPT | Performed by: UROLOGY

## 2024-11-14 PROCEDURE — 81002 URINALYSIS NONAUTO W/O SCOPE: CPT | Performed by: UROLOGY

## 2024-11-14 NOTE — PROGRESS NOTES
Cystoscopy     Date/Time  11/14/2024 11:40 AM     Performed by  Giuliano Juarez MD   Authorized by  Giuliano Juarez MD         Procedure Details:  Procedure type: cystoscopy      Ben is a 44-year-old female with a history of dysuria and pelvic pain along with gross hematuria.  Workup thus far has included a CT renal protocol which is reported as normal.  In addition urine cytology is negative for malignant cells.  She presents today for cystoscopy.      Cystoscopy Procedure note:    Risk and benefits of flexible cystoscopy were discussed. Informed consent was obtained. A urine dip is adequate for cystoscopy. The patient was placed into the modified supine position. Her genitalia was prepped and draped in a sterile fashion. Viscous lidocaine was instilled into the urethra. Flexible cystoscopy was then performed. The bladder was thoroughly inspected. Both ureteral orifices were visualized with clear efflux of urine. The bladder mucosa was thoroughly inspected. There was no evidence of mucosal abnormalities, stones, or lesions. Retroflexion was normal. Overall this was a negative cystoscopy. A female office staff member was present throughout the entire procedure.    Impression: Resolved gross hematuria, negative hematuria workup    Plan: I provided the patient with reassurance that upper tract imaging, cytology, and cystoscopy are all within normal limits.  Follow-up will be in 1 year with a urinalysis or sooner if she has recurrent gross hematuria.

## 2024-11-16 ENCOUNTER — OFFICE VISIT (OUTPATIENT)
Dept: URGENT CARE | Facility: CLINIC | Age: 44
End: 2024-11-16
Payer: COMMERCIAL

## 2024-11-16 VITALS
TEMPERATURE: 97.3 F | HEART RATE: 80 BPM | RESPIRATION RATE: 18 BRPM | OXYGEN SATURATION: 99 % | SYSTOLIC BLOOD PRESSURE: 110 MMHG | DIASTOLIC BLOOD PRESSURE: 79 MMHG

## 2024-11-16 DIAGNOSIS — R31.9 HEMATURIA, UNSPECIFIED TYPE: ICD-10-CM

## 2024-11-16 DIAGNOSIS — R39.9 UTI SYMPTOMS: Primary | ICD-10-CM

## 2024-11-16 LAB
SL AMB  POCT GLUCOSE, UA: NEGATIVE
SL AMB LEUKOCYTE ESTERASE,UA: ABNORMAL
SL AMB POCT BILIRUBIN,UA: ABNORMAL
SL AMB POCT BLOOD,UA: ABNORMAL
SL AMB POCT CLARITY,UA: ABNORMAL
SL AMB POCT COLOR,UA: ABNORMAL
SL AMB POCT KETONES,UA: 40
SL AMB POCT NITRITE,UA: NEGATIVE
SL AMB POCT PH,UA: 5
SL AMB POCT SPECIFIC GRAVITY,UA: 1.02
SL AMB POCT URINE PROTEIN: 300
SL AMB POCT UROBILINOGEN: 0.2

## 2024-11-16 PROCEDURE — 87086 URINE CULTURE/COLONY COUNT: CPT | Performed by: PHYSICIAN ASSISTANT

## 2024-11-16 PROCEDURE — 81002 URINALYSIS NONAUTO W/O SCOPE: CPT | Performed by: PHYSICIAN ASSISTANT

## 2024-11-16 PROCEDURE — 99214 OFFICE O/P EST MOD 30 MIN: CPT | Performed by: PHYSICIAN ASSISTANT

## 2024-11-16 RX ORDER — NITROFURANTOIN 25; 75 MG/1; MG/1
100 CAPSULE ORAL 2 TIMES DAILY
Qty: 14 CAPSULE | Refills: 0 | Status: SHIPPED | OUTPATIENT
Start: 2024-11-16

## 2024-11-16 NOTE — PATIENT INSTRUCTIONS
In office UA done and positive for blood and leukocytes. Will send for culture to ensure bacteria is covered by antibiotic. Will Notify patient if antibiotic needs to be changed.   Take Nitrofurantoin as prescribed  Urine discoloration may occur with Phenazopyridine  Drink plenty of water   Cranberry supplements  Urinate within 5 minutes following intercourse  Follow up with OB/GYN   If tests have been performed at [facility], our office will contact you with results if changes need to be made to the care plan discussed with you at the visit.  You [facility]  Follow up with PCP in 3-5 days.  Proceed to  ER if symptoms worsen.    Call urology on Monday and let them know about the symptoms.

## 2024-11-16 NOTE — PROGRESS NOTES
[facility]        NAME: Ben Rivas is a 44 y.o. female  : 1980    MRN: [id number]  DATE: 2024  TIME: 1:23 PM    Assessment and Plan   UTI symptoms [R39.9]  1. UTI symptoms  nitrofurantoin (MACROBID) 100 mg capsule      2. Hematuria, unspecified type  POCT urine dip    Urine culture            Patient Instructions   In office UA done and positive for blood and leukocytes. Will send for culture to ensure bacteria is covered by antibiotic. Will Notify patient if antibiotic needs to be changed.   Take Nitrofurantoin as prescribed  Urine discoloration may occur with Phenazopyridine  Drink plenty of water   Cranberry supplements  Urinate within 5 minutes following intercourse  Follow up with OB/GYN   If tests have been performed at [facility], our office will contact you with results if changes need to be made to the care plan discussed with you at the visit.  You can review your full results on [facility]  Follow up with PCP in 3-5 days.  Proceed to  ER if symptoms worsen.  Call urology on Monday and let them know about the symptoms.  Chief Complaint     Chief Complaint   Patient presents with    Hematuria     Patient here with blood in urine that she just noticed this morning. She states she had a cystoscopy on Thursday but did not notice any blood until this morning. She had cystoscopy done due to hematuria and dysuria recently. She is having lower abdominal cramping as well.          History of Present Illness       HPI  This is a 44-year-old female here complaining of blood in her urine, burning, urgency and frequency since today.  Patient notes she has a history of chronic UTIs.  She did have a cystoscopy on Thursday for this.  Per cystoscopy report the procedure was unremarkable.  Patient complains of left lower back pain and suprapubic pressure.  She does note some left-sided abdominal pain.  Her last UTI per her was in August.  She has no allergies to antibiotics.  She  denies fever, vomiting, diarrhea, shortness of breath or chest pain.  Review of Systems   Review of Systems   Constitutional:  Negative for fever.   Respiratory:  Negative for shortness of breath.    Cardiovascular:  Negative for chest pain.   Gastrointestinal:  Positive for abdominal pain. Negative for diarrhea and vomiting.   Genitourinary:  Positive for dysuria, frequency, hematuria and urgency.   Musculoskeletal:  Positive for back pain.         Current Medications       Current Outpatient Medications:     albuterol (PROVENTIL HFA,VENTOLIN HFA) 90 mcg/act inhaler, Inhale, Disp: , Rfl:     Bioflavonoid Products (Suyapa-C) 500-550 MG TABS, Take 1,000 mg by mouth, Disp: , Rfl:     Cholecalciferol 50 MCG (2000 UT) CAPS, Take 3 capsules by mouth 34695 units daily liquid, Disp: , Rfl:     clotrimazole-betamethasone (LOTRISONE) 1-0.05 % cream, Apply topically 2 (two) times a day as needed (itching), Disp: 30 g, Rfl: 0    erythromycin (ILOTYCIN) ophthalmic ointment, , Disp: , Rfl:     Flarex 0.1 % ophthalmic suspension, , Disp: , Rfl:     fluconazole (DIFLUCAN) 150 mg tablet, Take 150 mg by mouth once, Disp: , Rfl:     fluticasone (FLONASE) 50 mcg/act nasal spray, USE 1 SPRAY IN EACH NOSTRIL TWICE A DAY WITH MEALS, Disp: 16 mL, Rfl: 3    hydrOXYzine HCL (ATARAX) 10 mg tablet, Take 1 tablet (10 mg total) by mouth daily at bedtime, Disp: 90 tablet, Rfl: 3    nitrofurantoin (MACROBID) 100 mg capsule, Take 1 capsule (100 mg total) by mouth 2 (two) times a day, Disp: 14 capsule, Rfl: 0    rizatriptan (MAXALT-MLT) 5 mg disintegrating tablet, Take 1 tablet (5 mg total) by mouth once as needed for migraine for up to 1 dose may repeat in 2 hours if necessary, Disp: 10 tablet, Rfl: 5    Current Allergies     Allergies as of 11/16/2024    (No Known Allergies)            The following portions of the patient's history were reviewed and updated as appropriate: allergies, current medications, past family history, past medical history,  past social history, past surgical history and problem list.     Past Medical History:   Diagnosis Date    Acne 12/14/2015    Allergic     Anxiety     Asthma     Depression     Eustachian tube dysfunction 12/19/2017    External hemorrhoids 11/25/2014    Femoral hernia with obstruction     Fibromyalgia, primary 1/26/2012    Gonorrhea 11/15/2021    Headache(784.0) 11/10/2023    Headache, tension-type     Hemorrhoids 12/14/2015    History of cervical dysplasia 03/02/2020    Increased frequency of urination 04/11/2023    Irregular menses     Irregular periods 11/11/2021    Joint pain 05/16/2014    Malaise and fatigue     Migraine 06/30/2023    Migraine with aura    Neurogenic pain     Ovarian cyst 06/19/2017    Pelvic floor tension 11/21/2022    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HCC)     Sleep apnea     Thymus disorder (HCC)     last assessed 4/17/14; resolved 7/2/15    Vaginal burning 06/19/2017       Past Surgical History:   Procedure Laterality Date    DILATION AND CURETTAGE OF UTERUS      NO PAST SURGERIES      OSTEOTOMY  12/2020       Family History   Problem Relation Age of Onset    Asthma Mother     Hyperlipidemia Mother     Hypertension Mother     Obesity Mother     Neuropathy Mother     Hyperlipidemia Father     Hypertension Father     Diabetes type II Father     Heart disease Father         valvular    Migraines Maternal Aunt          Medications have been verified.        Objective   /79 (Patient Position: Sitting)   Pulse 80   Temp (!) 97.3 °F (36.3 °C)   Resp 18   SpO2 99%        Physical Exam     Physical Exam  Vitals and nursing note reviewed.   Constitutional:       General: She is not in acute distress.     Appearance: Normal appearance. She is not ill-appearing, toxic-appearing or diaphoretic.   Cardiovascular:      Rate and Rhythm: Normal rate and regular rhythm.   Pulmonary:      Effort: Pulmonary effort is normal.      Breath sounds: Normal breath sounds.   Abdominal:      Palpations:  Abdomen is soft.      Tenderness: There is abdominal tenderness. There is no right CVA tenderness, left CVA tenderness or guarding.      Comments: Mild suprapubic tenderness.   Neurological:      Mental Status: She is alert.

## 2024-11-17 ENCOUNTER — NURSE TRIAGE (OUTPATIENT)
Dept: OTHER | Facility: OTHER | Age: 44
End: 2024-11-17

## 2024-11-17 ENCOUNTER — TELEPHONE (OUTPATIENT)
Dept: OTHER | Facility: OTHER | Age: 44
End: 2024-11-17

## 2024-11-17 LAB — BACTERIA UR CULT: NORMAL

## 2024-11-17 NOTE — TELEPHONE ENCOUNTER
Instructed this patient to go  her Macrobid now and start it with plenty of water to drink. She was seen yesterday for UTI but has not started it yet.She will comply.  Please call patient back in th morning regarding other questions that she has about her CT renal done last month.

## 2024-11-17 NOTE — TELEPHONE ENCOUNTER
"Regarding: Left side pain/ Lower abdominal pain  ----- Message from Claudia VUONG sent at 11/17/2024 12:03 PM EST -----  Pt stated, \" I am calling to go over my abnormal lab results. I also am having pain on my left sided and lower abdominal area.\"    "

## 2024-11-17 NOTE — TELEPHONE ENCOUNTER
"Pt stated, \" I would like to go over my results I would also like to schedule an appointment with the provider.\"    Please follow up , thank you.  "

## 2024-11-17 NOTE — TELEPHONE ENCOUNTER
"Reason for Disposition  • [1] Caller requesting NON-URGENT health information AND [2] PCP's office is the best resource    Answer Assessment - Initial Assessment Questions  1. REASON FOR CALL: \"What is the main reason for your call?\" or \"How can I best help you?\"      Results  2. SYMPTOMS : \"Do you have any symptoms?\"       UTI SXS seen at [facility] yesterday for blood in her urine. She has not picked up her Macrobid yet.  3. OTHER QUESTIONS: \"Do you have any other questions?\"      She has questions about the CT renal  done last month. The  already reviewed but she has more questions about the splenic cysts that are stated in the results. I explained to her that they are benign and nothing to worry about but she has other questions for the [facility]    Protocols used: Information Only Call - No Triage-Adult-    "

## 2024-11-18 NOTE — TELEPHONE ENCOUNTER
Called and left VM for the PT per CC with CT results. Office number left for the PT if any questions.

## 2024-11-19 DIAGNOSIS — J30.1 SEASONAL ALLERGIC RHINITIS DUE TO POLLEN: ICD-10-CM

## 2024-11-19 RX ORDER — FLUTICASONE PROPIONATE 50 MCG
SPRAY, SUSPENSION (ML) NASAL
Qty: 16 ML | Refills: 3 | Status: SHIPPED | OUTPATIENT
Start: 2024-11-19

## 2025-01-10 DIAGNOSIS — E23.7 ABNORMALITY OF PITUITARY GLAND (HCC): Primary | ICD-10-CM

## 2025-01-10 NOTE — TELEPHONE ENCOUNTER
Dr. Velásquez: pt was agreeable to getting MRI Pituitary gland. Order never placed. Please place script

## 2025-01-27 ENCOUNTER — TELEPHONE (OUTPATIENT)
Age: 45
End: 2025-01-27

## 2025-01-27 NOTE — TELEPHONE ENCOUNTER
Pt calling asking about MRI of pituitary gland that Dr Velásquez had mentioned to get done. Advised pt order is in the chart. Provided ph# and warm transferred to central scheduling. Nadege will further assist.

## 2025-02-13 NOTE — TELEPHONE ENCOUNTER
Med refill on:  fluticasone (FLONASE) 50 mcg/act nasal spray    [facility]  foreign body throat

## 2025-02-14 ENCOUNTER — HOSPITAL ENCOUNTER (OUTPATIENT)
Dept: MRI IMAGING | Facility: CLINIC | Age: 45
Discharge: HOME | End: 2025-02-14
Payer: COMMERCIAL

## 2025-02-14 DIAGNOSIS — E23.7 ABNORMALITY OF PITUITARY GLAND (HCC): ICD-10-CM

## 2025-02-14 PROCEDURE — 70553 MRI BRAIN STEM W/O & W/DYE: CPT

## 2025-02-14 PROCEDURE — A9585 GADOBUTROL INJECTION: HCPCS | Performed by: PSYCHIATRY & NEUROLOGY

## 2025-02-14 RX ORDER — GADOBUTROL 604.72 MG/ML
5 INJECTION INTRAVENOUS
Status: COMPLETED | OUTPATIENT
Start: 2025-02-14 | End: 2025-02-14

## 2025-02-14 RX ADMIN — GADOBUTROL 5 ML: 604.72 INJECTION INTRAVENOUS at 12:45

## 2025-02-21 ENCOUNTER — RESULTS FOLLOW-UP (OUTPATIENT)
Age: 45
End: 2025-02-21

## 2025-02-25 ENCOUNTER — TELEPHONE (OUTPATIENT)
Dept: PAIN MEDICINE | Facility: CLINIC | Age: 45
End: 2025-02-25

## 2025-03-12 ENCOUNTER — CONSULT (OUTPATIENT)
Dept: PAIN MEDICINE | Facility: CLINIC | Age: 45
End: 2025-03-12
Payer: COMMERCIAL

## 2025-03-12 VITALS — WEIGHT: 124 LBS | HEIGHT: 64 IN | BODY MASS INDEX: 21.17 KG/M2

## 2025-03-12 DIAGNOSIS — G89.4 CHRONIC PAIN SYNDROME: ICD-10-CM

## 2025-03-12 DIAGNOSIS — M79.18 MYOFASCIAL PAIN SYNDROME: Primary | ICD-10-CM

## 2025-03-12 DIAGNOSIS — M54.12 CERVICAL RADICULOPATHY: ICD-10-CM

## 2025-03-12 PROCEDURE — 99204 OFFICE O/P NEW MOD 45 MIN: CPT | Performed by: STUDENT IN AN ORGANIZED HEALTH CARE EDUCATION/TRAINING PROGRAM

## 2025-03-12 RX ORDER — ACETAMINOPHEN AND IBUPROFEN 250; 125 MG/1; MG/1
TABLET ORAL
COMMUNITY

## 2025-03-12 NOTE — PATIENT INSTRUCTIONS
Patient Education     Trigger Point Injection   Why is this procedure done?   A trigger point is a very painful area in a muscle. You may have a lump or feel a knot. The trigger point causes pain right where it is, or in the area around the trigger point. You may have less movement in your neck, arm, or leg if you have a trigger point. Your pain may increase if someone presses on this area. You may have pain far away from the trigger point. You may even have pain when you are not moving.   This kind of injection is used to help lower pain. When your pain is less, you may be able to move more and do more physical therapy. The goal is to break the pain cycle at this very painful spot.  What will the results be?   Less pain  Less swelling in the nerves  Better movement  What happens before the procedure?   Your doctor will take your history and do an exam. Talk to the doctor about:  All the drugs you are taking. Be sure to include all prescription and over-the-counter (OTC) drugs, and herbal supplements. Tell the doctor about any drug allergy. Bring a list of drugs you take with you.  If you have high blood sugar or diabetes. Your drugs may need to be changed.  Any bleeding problems. Be sure to tell your doctor if you are taking any drugs that may cause bleeding. Some of these are warfarin, rivaroxaban, apixaban, ticagrelor, clopidogrel, ketorolac, ibuprofen, naproxen, or aspirin. Certain vitamins and herbs, such as garlic and fish oil, may also add to the risk for bleeding. You may need to stop these drugs as well. Talk to your doctor about them.  If you need to stop eating or drinking before your procedure.  You will not be allowed to drive right away after the procedure. Ask a family member or a friend to drive you home.  What happens during the procedure?   The painful area is cleaned with a special soap. Your doctor may give you a drug to numb the painful area. Once the point of the pain is located, your doctor  will inject the drug into this trigger point. The whole procedure will take only a few minutes.  What happens after the procedure?   You can go home after the procedure.  You will feel numb in the area where the shot is given.  You should feel less pain right away.  What care is needed at home?   Ask your doctor what you need to do when you go home. Make sure you ask questions if you do not understand what the doctor says. This way you will know what you need to do.   Your doctor or physical therapist may give you some muscle stretching exercises to do.  Apply ice to the injection site if it is sore. Place an ice pack or a bag of frozen peas wrapped in a towel over the painful part. Never put ice right on the skin. Do not leave the ice on more than 10 to 15 minutes at a time.  What follow-up care is needed?   Your doctor may ask you to make visits to the office to check on your progress. Be sure to keep these visits.  Your doctor may ask you to see a physical therapist for exercises. Be sure to keep these visits.  Your doctor may ask you to do exercises for the area that was affected by the trigger point. Do the exercises as directed at home.  What problems could happen?   Infection  Blood clot  Lung collapse or trouble breathing if the injection was in the chest or back  Muscle pain does not go away  When do I need to call the doctor?   Signs of infection. These include a fever of 100.4°F (38°C) or higher, chills.  Signs of infection at shot site. These include swelling, redness, warmth around the wound; too much pain when touched; yellowish, greenish, or bloody discharge.  Shortness of breath or chest pain. If you have this sign, go to the ER right away.  Numbness, tingling, or weakness where the shot was given  Last Reviewed Date   2020-10-13  Consumer Information Use and Disclaimer   This generalized information is a limited summary of diagnosis, treatment, and/or medication information. It is not meant to be  "comprehensive and should be used as a tool to help the user understand and/or assess potential diagnostic and treatment options. It does NOT include all information about conditions, treatments, medications, side effects, or risks that may apply to a specific patient. It is not intended to be medical advice or a substitute for the medical advice, diagnosis, or treatment of a health care provider based on the health care provider's examination and assessment of a patient’s specific and unique circumstances. Patients must speak with a health care provider for complete information about their health, medical questions, and treatment options, including any risks or benefits regarding use of medications. This information does not endorse any treatments or medications as safe, effective, or approved for treating a specific patient. UpToDate, Inc. and its affiliates disclaim any warranty or liability relating to this information or the use thereof. The use of this information is governed by the Terms of Use, available at https://www.LeanData.Zappli/en/know/clinical-effectiveness-terms   Copyright   Copyright © 2024 UpToDate, Inc. and its affiliates and/or licensors. All rights reserved.  Patient Education     Epidural injection   The Basics   Written by the doctors and editors at [employer]   What is an epidural injection? -- An epidural injection can be used to treat a condition called \"radiculopathy.\" This is the medical term for the pain, weakness, numbness, or tingling that happens when nerves coming from the spinal cord get pinched or damaged.  The doctor injects medicines into the space outside the covering of the spinal cord (figure 1). This is similar to an \"epidural\" that is used for pain relief during labor and childbirth.  Epidural injections can be given into different parts of your back:   Cervical epidural injection - Used to help with pain in the head or arms.   Thoracic epidural injection - Used to help " "with pain in the upper or middle back.   Lumbar epidural injection - Used to help with pain in the lower back or legs.  How do I prepare for an epidural injection? -- The doctor or nurse will tell you if you need to do anything special to prepare. Before your procedure, your doctor will do an exam. They might send you to get tests, such as:   X-ray, ultrasound, or other imaging tests - Imaging tests create pictures of the inside of the body.  Your doctor will also ask you about your \"health history.\" This involves asking you questions about any health problems you have or had in the past, past surgeries, and any medicines you take. Tell them about:   Any medicines you are taking - This includes any prescription or \"over-the-counter\" medicines you use, plus any herbal supplements you take. It helps to write down and bring a list of any medicines you take, or bring a bag with all of your medicines with you.   Any allergies you have   Any bleeding problems you have - Certain medicines, including some herbs and supplements, can increase the risk of bleeding. Some health conditions also increase this risk.  You will also get information about:   Eating and drinking before your procedure - In some cases, you might need to \"fast\" before surgery. This means not eating or drinking anything for a period of time. In other cases, you might be allowed to have liquids until a short time before the procedure. Whether you need to fast, and for how long, depends on the procedure you are having.   What help you will need when you go home - For example, you might need to have someone else bring you home or stay with you for some time while you recover.  Ask the doctor or nurse if you have questions or if there is anything you do not understand.  What happens during an epidural injection? -- When it is time for the procedure:   You might get an \"IV,\" which is a thin tube that goes into a vein. This can be used to give you fluids and " "medicines.   You will get anesthesia medicines to numb the area where the doctor will give the injection. This is to make sure that you do not feel pain during the procedure. You might also get medicines to make you relax and feel sleepy, called \"sedatives.\"   The doctors and nurses will monitor your breathing, blood pressure, and heart rate during the procedure.   The doctor might use a continuous X-ray called \"fluoroscopy.\" This is to help make sure that the medicines are injected into the right place. The doctor might also inject a dye to see where to give the medicine.   The doctor will place a needle through your skin and inject the medicine into a space near your spine. Then, they will remove the needle and cover the area with a clean bandage.   The procedure takes 15 to 30 minutes.  What happens after an epidural injection? -- After your procedure, the staff will watch you closely for a short time. It might take a few days before you feel the effects of the epidural injection.  Before you go home, make sure that you know what problems to look out for and when to call the doctor. Make sure that you understand your doctor's or nurse's instructions. Ask questions about anything you do not understand.  For the rest of the day after your procedure:   Try to rest. Limit activities like exercise or driving.   The doctor might recommend an over-the-counter pain medicine. These include acetaminophen (sample brand name: Tylenol), ibuprofen (sample brand names: Advil, Motrin), and naproxen (sample brand name: Aleve).   Ice can help with pain and swelling. Put a cold gel pack, bag of ice, or bag of frozen vegetables on the injection site area every 1 to 2 hours, for 15 minutes each time. Put a thin towel between the ice (or other cold object) and the skin.  What are the risks of an epidural injection? -- Your doctor will talk to you about all of the possible risks, and answer your questions. Possible risks include:   " "Bleeding   Infection   Headache   Nerve injury  When should I call the doctor? -- Call for emergency help right away (in the [place] and [place], call 9-1-1) if:   You can't move your arms or legs.  Call for advice if:   You have a fever of 100.4°F (38°C) or higher, or chills.   You have redness or swelling around the injection site.   You have a headache.   Your arms or legs are numb, weak, or tingly.  All topics are updated as new evidence becomes available and our peer review process is complete.  This topic retrieved from "[employer]" on: May 15, 2024.  Topic 520099 Version 1.0  Release: 32.4.3 - C32.134  © 2024 UpToDate, Inc. and/or its affiliates. All rights reserved.  figure 1: Epidural injection     Duringan epidural injection, the doctor inserts a needle between 2 of the bones thatmake up the spine. Then, they inject medicines into the area around the spinalcord. This is an illustration of a \"lumbar\" epidural injection, which is given into the low back. The doctor can place the needle in other areas to treat other types of pain.  Graphic 517122 Version 1.0  Consumer Information Use and Disclaimer   Disclaimer: This generalized information is a limited summary of diagnosis, treatment, and/or medication information. It is not meant to be comprehensive and should be used as a tool to help the user understand and/or assess potential diagnostic and treatment options. It does NOT include all information about conditions, treatments, medications, side effects, or risks that may apply to a specific patient. It is not intended to be medical advice or a substitute for the medical advice, diagnosis, or treatment of a health care provider based on the health care provider's examination and assessment of a patient's specific and unique circumstances. Patients must speak with a health care provider for complete information about their health, medical questions, and treatment options, including any risks or benefits regarding use " of medications. This information does not endorse any treatments or medications as safe, effective, or approved for treating a specific patient. UpToDate, Inc. and its affiliates disclaim any warranty or liability relating to this information or the use thereof.The use of this information is governed by the Terms of Use, available at https://www.[employer].com.com/en/know/clinical-effectiveness-terms. 2024© UpToDate, Inc. and its affiliates and/or licensors. All rights reserved.  Copyright   © 2024 UpToDate, Inc. and/or its affiliates. All rights reserved.

## 2025-03-12 NOTE — PROGRESS NOTES
Assessment:  1. Myofascial pain syndrome    2. Cervical radiculopathy    3. Chronic pain syndrome        Plan:  Orders Placed This Encounter   Procedures    FL spine and pain procedure     Standing Status:   Future     Expected Date:   3/12/2025     Expiration Date:   3/12/2029     Reason for Exam::   left C7-T1 JOSE     Anticoagulant hold needed?:   NO     Is the patient pregnant?:   Unknown       New Medications Ordered This Visit   Medications    Ibuprofen-Acetaminophen (Acetaminophen-Ibuprofen) 125-250 MG TABS     Sig: Take by mouth       My impressions and treatment recommendations were discussed in detail with the patient, who verbalized understanding and had no further questions.    44-year-old female presents her office with neck pain and neuropathic symptoms in the left upper extremity.  Has EMG diagnosed C6-7 radiculopathy on the left.  MRI with mild degenerative pathology but no severe nerve impingement that I feel requires immediate surgical evaluation.    She has undergone physical therapy, trial of multiple neuropathic medications.  We discussed possible C7-T1 epidural steroid injection under fluoroscopic guidance help with her symptoms.  Will also order cervical traction unit for her.    Prior to JOSE, we discussed first trialing trigger point injection for the myofascial symptoms in the left trapezius, rhomboid and splenius capitis musculature.  If she is doing very well with this then she can cancel CSI.    She also has history of fibromyalgia and is doing many things on her own to try to control her symptoms including vitamin D supplementation, dietary changes, meditation.  I did discuss low-dose naltrexone as a potential addition and information was provided to the patient today.  She was also given information on anti-inflammatory diet.    [place] Prescription Drug Monitoring Program report was reviewed and was appropriate     Complete risks and benefits including bleeding, infection, tissue  reaction, nerve injury and allergic reaction were discussed. The approach was demonstrated using models and literature was provided. Verbal and written consent was obtained.      Discharge instructions were provided. I personally saw and examined the patient and I agree with the above discussed plan of care.    History of Present Illness:    Ben Rivas is a 44 y.o. female who presents to [facility] for initial evaluation of the above stated pain complaints. The patient has a past medical and chronic pain history as outlined in the assessment section. She was referred by Adrienne Velásquez MD  [address] [facility]  [address] [place] [id number].    Patient is here with chief complaint of headache and neck pain with radiation into the left upper extremity.  Is chronic for over 1 year.  Severe over the past month.  7 out of 10.  Nearly constant.  Burning, cramping, numbness, sharp, pins-and-needles, pressure-like, throbbing, dull/aching.    Pain is increased with standing, bending, sitting, exercise.  Decreased with lying down and relaxation.    In the past has been on tramadol, lidocaine, ibuprofen, meloxicam, methocarbamol.  She has also tried Cymbalta, Lyrica, venlafaxine.    Reports moderately with PT, exercise, heat/ice therapy, psychotherapy.      Review of Systems:    Review of Systems   Eyes:  Positive for visual disturbance.   Gastrointestinal:  Positive for abdominal pain.   Endocrine: Positive for polyuria.   Genitourinary:  Positive for hematuria.   Musculoskeletal:  Positive for arthralgias, myalgias, neck pain and neck stiffness.   Neurological:  Positive for dizziness, numbness and headaches.   Psychiatric/Behavioral:  The patient is nervous/anxious.            Past Medical History:   Diagnosis Date    Acne 12/14/2015    Allergic     Anxiety     Asthma     Depression     Eustachian tube dysfunction 12/19/2017    External hemorrhoids 11/25/2014    Femoral hernia  with obstruction     Fibromyalgia, primary 1/26/2012    Gonorrhea 11/15/2021    Headache(784.0) 11/10/2023    Headache, tension-type     Hemorrhoids 12/14/2015    History of cervical dysplasia 03/02/2020    Increased frequency of urination 04/11/2023    Irregular menses     Irregular periods 11/11/2021    Joint pain 05/16/2014    Malaise and fatigue     Migraine 06/30/2023    Migraine with aura    Neurogenic pain     Ovarian cyst 06/19/2017    Pelvic floor tension 11/21/2022    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HCC)     Sleep apnea     Thymus disorder (HCC)     last assessed 4/17/14; resolved 7/2/15    Vaginal burning 06/19/2017       Past Surgical History:   Procedure Laterality Date    DILATION AND CURETTAGE OF UTERUS      NO PAST SURGERIES      OSTEOTOMY  12/2020       Family History   Problem Relation Age of Onset    Asthma Mother     Hyperlipidemia Mother     Hypertension Mother     Obesity Mother     Neuropathy Mother     Hyperlipidemia Father     Hypertension Father     Diabetes type II Father     Heart disease Father         valvular    Migraines Maternal Aunt        Social History     Occupational History    Occupation: marketing    Tobacco Use    Smoking status: Never     Passive exposure: Past    Smokeless tobacco: Never   Vaping Use    Vaping status: Never Used   Substance and Sexual Activity    Alcohol use: No    Drug use: No    Sexual activity: Yes     Partners: Male     Birth control/protection: Condom Male         Current Outpatient Medications:     albuterol (PROVENTIL HFA,VENTOLIN HFA) 90 mcg/act inhaler, Inhale, Disp: , Rfl:     Bioflavonoid Products (Suyapa-C) 500-550 MG TABS, Take 1,000 mg by mouth, Disp: , Rfl:     Cholecalciferol 50 MCG (2000 UT) CAPS, Take 3 capsules by mouth 32475 units daily liquid, Disp: , Rfl:     clotrimazole-betamethasone (LOTRISONE) 1-0.05 % cream, Apply topically 2 (two) times a day as needed (itching), Disp: 30 g, Rfl: 0    erythromycin (ILOTYCIN) ophthalmic ointment,  ", Disp: , Rfl:     Flarex 0.1 % ophthalmic suspension, , Disp: , Rfl:     fluconazole (DIFLUCAN) 150 mg tablet, Take 150 mg by mouth once, Disp: , Rfl:     fluticasone (FLONASE) 50 mcg/act nasal spray, USE 1 SPRAY IN EACH NOSTRIL TWICE A DAY WITH MEALS, Disp: 16 mL, Rfl: 3    hydrOXYzine HCL (ATARAX) 10 mg tablet, Take 1 tablet (10 mg total) by mouth daily at bedtime, Disp: 90 tablet, Rfl: 3    Ibuprofen-Acetaminophen (Acetaminophen-Ibuprofen) 125-250 MG TABS, Take by mouth, Disp: , Rfl:     nitrofurantoin (MACROBID) 100 mg capsule, Take 1 capsule (100 mg total) by mouth 2 (two) times a day, Disp: 14 capsule, Rfl: 0    rizatriptan (MAXALT-MLT) 5 mg disintegrating tablet, Take 1 tablet (5 mg total) by mouth once as needed for migraine for up to 1 dose may repeat in 2 hours if necessary, Disp: 10 tablet, Rfl: 5    No Known Allergies    Physical Exam:    Ht 5' 4\" (1.626 m)   Wt 56.2 kg (124 lb)   BMI 21.28 kg/m²     Constitutional: normal, well developed, well nourished, alert, in no distress and non-toxic and no overt pain behavior.  Eyes: anicteric  HEENT: grossly intact  Neck: supple, symmetric, trachea midline and no masses   Pulmonary:even and unlabored  Cardiovascular:No edema or pitting edema present  Skin:Normal without rashes or lesions and well hydrated  Psychiatric:Mood and affect appropriate  Neurologic:Cranial Nerves II-XII grossly intact  Musculoskeletal:normal    Cervical Spine Exam    Appearance:  Normal lordosis  Palpation/Tenderness:  left cervical paraspinal tenderness  left trapezium tenderness  trigger points palpable  Sensory:  no sensory deficits noted  Range of Motion:  Flexion:  No limitation  without pain  Extension:  Severely limited  with pain  Rotation - Left:  Moderately limited  with pain  Rotation - Right:  Moderately limited  with pain  Motor Strength:  Left Arm Flexion 5/5  Left Arm Extension 5/5  Right Arm Flexion 5/5  Right Arm Extension 5/5  Left Wrist Flexion 5/5  Left Wrist " Extension 5/5  Left Finger Abduction 5/5  Right Finger Abduction 5/5  Left Pincer Grasp 5/5  Right Pincer Grasp 5/5  Left  5/5  Right  5/5  Reflexes:  Left Biceps:  2+   Right Biceps:  2+   Left Brachioradialis:  2+   Right Brachioradialis:  2+   Left Triceps:  2+   Right Triceps:  2+     Imaging    MRI CERVICAL SPINE WITH AND WITHOUT CONTRAST       11/6/24     INDICATION: R29.898: Other symptoms and signs involving the musculoskeletal system  M54.12: Radiculopathy, cervical region.   Migraine headaches worsened by certain neck postures.     COMPARISON: Cervical spine radiograph 11/16/2023.     TECHNIQUE:  Multiplanar, multisequence imaging of the cervical spine was performed before and after gadolinium administration. .  Imaging performed on 1.5T MRI     IV Contrast:  5 mL of Gadobutrol injection (SINGLE-DOSE)     IMAGE QUALITY:  Diagnostic.     FINDINGS:     ALIGNMENT: Mild straightening of the superior cervical spine.  No compression fracture.  No subluxation.  No scoliosis.     MARROW SIGNAL:  Normal marrow signal is identified within the visualized bony structures.  No discrete marrow lesion.     CERVICAL AND VISUALIZED UPPER THORACIC CORD:  Normal signal within the visualized cord.     PREVERTEBRAL AND PARASPINAL SOFT TISSUES:  Normal.     VISUALIZED POSTERIOR FOSSA:  The visualized posterior fossa demonstrates no abnormal signal.     CERVICAL DISC SPACES: Mild anterior marginal osteophytosis from C4-C6.     C2-3: No focal disc herniation.  No central canal narrowing.  No neural foraminal stenosis.     C3-4: Mild diffuse disc bulge. No focal disc herniation.  No central canal narrowing.  No neural foraminal stenosis.     C4-5: Mild diffuse disc bulge. No focal disc herniation.  No central canal narrowing.  No neural foraminal stenosis.     C5-6: Mild diffuse disc bulge, more prominent at the right side. No focal disc herniation. There is partial effacement of the ventral subarachnoid space. No neural  foraminal stenosis.     C6-7: Mild diffuse disc bulge with a small focal central protrusion. No central canal narrowing.  No neural foraminal stenosis.     C7-T1: Mild diffuse disc bulge. No focal disc herniation.  No central canal narrowing.  No neural foraminal stenosis.     UPPER THORACIC DISC SPACES: Small central/right paracentral disc protrusion at the T3-4 level without central canal narrowing.     POSTCONTRAST IMAGING:  Normal.     OTHER FINDINGS: Prominent ovoid signal abnormality within the pituitary bed on the sagittal localizer sequence (series 1: 8) not seen on the prior MRI of 12/21/2023.     IMPRESSION:     Mild multilevel cervical degenerative disc disease as detailed without significant central canal narrowing.     The cervical cord appears normal in caliber and signal with no evidence of focal impingement.              Resident: Michael Ledbetter     I, the attending radiologist, have reviewed the images and agree with the final report above.     Workstation performed: [id number]     FL spine and pain procedure    (Results Pending)       Orders Placed This Encounter   Procedures    FL spine and pain procedure

## 2025-03-13 ENCOUNTER — PATIENT MESSAGE (OUTPATIENT)
Dept: PAIN MEDICINE | Facility: CLINIC | Age: 45
End: 2025-03-13

## 2025-03-13 NOTE — PATIENT COMMUNICATION
Pt is scheduled for JOSE with Dr Jackman on 4/22/25    Pt is not diabetic - pt reports she takes ibuprofen.  Was made aware she needs to hold med for 24 hours prior to procedure.    Pt given instructions in office and via myc message    Have you completed PT/HEP/Chiro in the past 6 months for dedicated area? PT with  Bia from Jan thru June 2024 - reported moderate relief   If yes, how long did you complete?  What was the frequency?  Did it provide relief?  If no, reason therapy was not completed?

## 2025-03-14 PROBLEM — M54.12 CERVICAL RADICULOPATHY: Status: ACTIVE | Noted: 2025-03-14

## 2025-03-14 PROBLEM — M79.18 MYOFASCIAL PAIN SYNDROME: Status: ACTIVE | Noted: 2025-03-14

## 2025-03-17 ENCOUNTER — OFFICE VISIT (OUTPATIENT)
Age: 45
End: 2025-03-17
Payer: COMMERCIAL

## 2025-03-17 VITALS
HEIGHT: 64 IN | WEIGHT: 125 LBS | RESPIRATION RATE: 16 BRPM | DIASTOLIC BLOOD PRESSURE: 70 MMHG | SYSTOLIC BLOOD PRESSURE: 102 MMHG | BODY MASS INDEX: 21.34 KG/M2 | HEART RATE: 70 BPM | OXYGEN SATURATION: 98 %

## 2025-03-17 DIAGNOSIS — M79.18 MYOFASCIAL PAIN SYNDROME: ICD-10-CM

## 2025-03-17 DIAGNOSIS — F41.1 ANXIETY STATE: ICD-10-CM

## 2025-03-17 DIAGNOSIS — Z13.220 SCREENING FOR LIPID DISORDERS: ICD-10-CM

## 2025-03-17 DIAGNOSIS — K58.1 IRRITABLE BOWEL SYNDROME WITH CONSTIPATION: ICD-10-CM

## 2025-03-17 DIAGNOSIS — R73.01 IMPAIRED FASTING GLUCOSE: ICD-10-CM

## 2025-03-17 DIAGNOSIS — J45.20 MILD INTERMITTENT EXTRINSIC ASTHMA WITHOUT COMPLICATION: Primary | ICD-10-CM

## 2025-03-17 DIAGNOSIS — Z12.31 ENCOUNTER FOR SCREENING MAMMOGRAM FOR BREAST CANCER: ICD-10-CM

## 2025-03-17 DIAGNOSIS — R53.83 OTHER FATIGUE: ICD-10-CM

## 2025-03-17 DIAGNOSIS — Z13.0 SCREENING FOR DEFICIENCY ANEMIA: ICD-10-CM

## 2025-03-17 DIAGNOSIS — N39.41 URGE INCONTINENCE OF URINE: ICD-10-CM

## 2025-03-17 DIAGNOSIS — M54.12 CERVICAL RADICULOPATHY: ICD-10-CM

## 2025-03-17 DIAGNOSIS — M79.7 FIBROMYALGIA: ICD-10-CM

## 2025-03-17 DIAGNOSIS — E78.2 MIXED HYPERLIPIDEMIA: ICD-10-CM

## 2025-03-17 PROCEDURE — 99214 OFFICE O/P EST MOD 30 MIN: CPT

## 2025-03-17 RX ORDER — ALBUTEROL SULFATE 90 UG/1
2 INHALANT RESPIRATORY (INHALATION) EVERY 6 HOURS PRN
Qty: 6.7 G | Refills: 1 | Status: SHIPPED | OUTPATIENT
Start: 2025-03-17

## 2025-03-17 NOTE — ASSESSMENT & PLAN NOTE
Recent cystoscopy was unremarkable. She is following with an herbalist at this time and making changes to her diet which provides relief.

## 2025-03-17 NOTE — ASSESSMENT & PLAN NOTE
She denies any chest tightness, SOB, wheezing, or coughing. She rarely uses her albuterol inhaler.   Orders:    albuterol (PROVENTIL HFA,VENTOLIN HFA) 90 mcg/act inhaler; Inhale 2 puffs every 6 (six) hours as needed for wheezing

## 2025-03-17 NOTE — PROGRESS NOTES
Name: Ben Rivas      : 1980      MRN: [id number]  Encounter Provider: Amy Little PA-C  Encounter Date: 3/17/2025   Encounter department: [facility] [address]  :  Assessment & Plan  Mild intermittent extrinsic asthma without complication  She denies any chest tightness, SOB, wheezing, or coughing. She rarely uses her albuterol inhaler.   Orders:    albuterol (PROVENTIL HFA,VENTOLIN HFA) 90 mcg/act inhaler; Inhale 2 puffs every 6 (six) hours as needed for wheezing    Irritable bowel syndrome with constipation  Currently stable.        Urge incontinence of urine  Recent cystoscopy was unremarkable. She is following with an herbalist at this time and making changes to her diet which provides relief.       Anxiety state  She does deep breathing exercises, healing frequencies, and mediation on a daily basis..        Fibromyalgia  She denies any recent flares.       Mixed hyperlipidemia  Due for lipid panel. She is not currently on a statin.        Encounter for screening mammogram for breast cancer  Due for mammogram.  Orders:    Mammo screening bilateral w 3d and cad; Future    Myofascial pain syndrome  Currently followed by pain management.       Cervical radiculopathy  Her recent EMG revealed C6-7 radiculopathy on the left side.  MRI revealed mild degenerative pathology but no severe nerve impingement.  She has undergone physical therapy and trial multiple neuropathic medications.  She is scheduled for a trigger point injection and was started on a cervical traction unit.       Screening for deficiency anemia    Orders:    CBC and differential; Future    Impaired fasting glucose    Orders:    Comprehensive metabolic panel; Future    Hemoglobin A1C; Future    Screening for lipid disorders    Orders:    Lipid panel; Future    Other fatigue    Orders:    TSH, 3rd generation with Free T4 reflex; Future           History of Present Illness   Patient is a 44-year-old female  that presents today for 6-month follow-up.  She has no new complaints today.    Recommended eating a well-balanced diet of fruits, veggies, and lean meats. She drinks an adequate amount of water. Recommended exercising 30 mins of moderate intensity 5x a week. She sleeps well. She denies any alcohol, tobacco, or illicit drug use. She is UTD with dentist and eye doctor.  She does not currently have a job. She is working on new certificates to start a new job or work for herself. 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      Health maintenance:  Due for mammogram.  Family history of hyperlipidemia, hypertension, obesity, type 2 diabetes, heart disease, and asthma.  Immunizations: Due for updated COVID booster, Tdap, and PCV.      Review of Systems   Constitutional:  Positive for fatigue. Negative for chills and fever.   HENT:  Negative for ear discharge, ear pain, postnasal drip, rhinorrhea, sinus pressure, sinus pain, sore throat, tinnitus and trouble swallowing.    Eyes:  Negative for pain, discharge and itching.   Respiratory:  Negative for cough, shortness of breath and wheezing.    Cardiovascular:  Negative for chest pain, palpitations and leg swelling.   Gastrointestinal:  Negative for abdominal pain, constipation, diarrhea, nausea and vomiting.   Endocrine: Negative for polydipsia, polyphagia and polyuria.   Genitourinary:  Negative for difficulty urinating, frequency, hematuria and urgency.   Musculoskeletal:  Positive for arthralgias and myalgias. Negative for joint swelling.   Skin:  Negative for color change.   Allergic/Immunologic: Negative for environmental allergies.   Neurological:  Negative for dizziness, weakness, light-headedness, numbness and headaches.   Hematological:  Negative for adenopathy.   Psychiatric/Behavioral:  Negative for decreased concentration and sleep disturbance. The patient is not nervous/anxious.        Objective   /70 (BP Location: Left arm)   Pulse 70   Resp 16    "Ht 5' 4\" (1.626 m)   Wt 56.7 kg (125 lb)   SpO2 98%   BMI 21.46 kg/m²      Physical Exam  Vitals and nursing note reviewed.   Constitutional:       General: She is awake. She is not in acute distress.     Appearance: Normal appearance. She is well-developed, well-groomed and normal weight.   HENT:      Head: Normocephalic and atraumatic.      Right Ear: Hearing and external ear normal.      Left Ear: Hearing and external ear normal.      Nose: Nose normal.      Mouth/Throat:      Lips: Pink.      Mouth: Mucous membranes are moist.   Eyes:      General: Lids are normal. Vision grossly intact. Gaze aligned appropriately.      Conjunctiva/sclera: Conjunctivae normal.   Neck:      Vascular: No carotid bruit.      Trachea: Trachea and phonation normal.   Cardiovascular:      Rate and Rhythm: Normal rate and regular rhythm.      Heart sounds: Normal heart sounds, S1 normal and S2 normal. No murmur heard.     No friction rub. No gallop.   Pulmonary:      Effort: Pulmonary effort is normal. No respiratory distress.      Breath sounds: Normal breath sounds and air entry. No decreased breath sounds, wheezing, rhonchi or rales.   Abdominal:      General: Abdomen is flat.   Musculoskeletal:         General: No swelling.      Cervical back: Neck supple.      Right lower leg: No edema.      Left lower leg: No edema.   Skin:     General: Skin is warm.      Capillary Refill: Capillary refill takes less than 2 seconds.   Neurological:      Mental Status: She is alert.   Psychiatric:         Attention and Perception: Attention and perception normal.         Mood and Affect: Mood and affect normal.         Speech: Speech normal.         Behavior: Behavior normal. Behavior is cooperative.         Thought Content: Thought content normal.         Cognition and Memory: Cognition and memory normal.         Judgment: Judgment normal.         "

## 2025-03-17 NOTE — ASSESSMENT & PLAN NOTE
Her recent EMG revealed C6-7 radiculopathy on the left side.  MRI revealed mild degenerative pathology but no severe nerve impingement.  She has undergone physical therapy and trial multiple neuropathic medications.  She is scheduled for a trigger point injection and was started on a cervical traction unit.

## 2025-03-20 ENCOUNTER — APPOINTMENT (OUTPATIENT)
Dept: LAB | Facility: CLINIC | Age: 45
End: 2025-03-20
Payer: COMMERCIAL

## 2025-03-20 DIAGNOSIS — Z13.0 SCREENING FOR DEFICIENCY ANEMIA: ICD-10-CM

## 2025-03-20 DIAGNOSIS — R73.01 IMPAIRED FASTING GLUCOSE: ICD-10-CM

## 2025-03-20 DIAGNOSIS — Z13.220 SCREENING FOR LIPID DISORDERS: ICD-10-CM

## 2025-03-20 DIAGNOSIS — R53.83 OTHER FATIGUE: ICD-10-CM

## 2025-03-20 LAB
ALBUMIN SERPL BCG-MCNC: 4.7 G/DL (ref 3.5–5)
ALP SERPL-CCNC: 57 U/L (ref 34–104)
ALT SERPL W P-5'-P-CCNC: 8 U/L (ref 7–52)
ANION GAP SERPL CALCULATED.3IONS-SCNC: 11 MMOL/L (ref 4–13)
AST SERPL W P-5'-P-CCNC: 13 U/L (ref 13–39)
BASOPHILS # BLD AUTO: 0.04 THOUSANDS/ÂΜL (ref 0–0.1)
BASOPHILS NFR BLD AUTO: 1 % (ref 0–1)
BILIRUB SERPL-MCNC: 0.54 MG/DL (ref 0.2–1)
BUN SERPL-MCNC: 10 MG/DL (ref 5–25)
CALCIUM SERPL-MCNC: 9.6 MG/DL (ref 8.4–10.2)
CHLORIDE SERPL-SCNC: 107 MMOL/L (ref 96–108)
CHOLEST SERPL-MCNC: 186 MG/DL (ref ?–200)
CO2 SERPL-SCNC: 23 MMOL/L (ref 21–32)
CREAT SERPL-MCNC: 0.75 MG/DL (ref 0.6–1.3)
EOSINOPHIL # BLD AUTO: 0.03 THOUSAND/ÂΜL (ref 0–0.61)
EOSINOPHIL NFR BLD AUTO: 1 % (ref 0–6)
ERYTHROCYTE [DISTWIDTH] IN BLOOD BY AUTOMATED COUNT: 13.4 % (ref 11.6–15.1)
EST. AVERAGE GLUCOSE BLD GHB EST-MCNC: 111 MG/DL
GFR SERPL CREATININE-BSD FRML MDRD: 97 ML/MIN/1.73SQ M
GLUCOSE P FAST SERPL-MCNC: 97 MG/DL (ref 65–99)
HBA1C MFR BLD: 5.5 %
HCT VFR BLD AUTO: 43.4 % (ref 34.8–46.1)
HDLC SERPL-MCNC: 66 MG/DL
HGB BLD-MCNC: 14 G/DL (ref 11.5–15.4)
IMM GRANULOCYTES # BLD AUTO: 0.01 THOUSAND/UL (ref 0–0.2)
IMM GRANULOCYTES NFR BLD AUTO: 0 % (ref 0–2)
LDLC SERPL CALC-MCNC: 109 MG/DL (ref 0–100)
LYMPHOCYTES # BLD AUTO: 1.99 THOUSANDS/ÂΜL (ref 0.6–4.47)
LYMPHOCYTES NFR BLD AUTO: 37 % (ref 14–44)
MCH RBC QN AUTO: 28.1 PG (ref 26.8–34.3)
MCHC RBC AUTO-ENTMCNC: 32.3 G/DL (ref 31.4–37.4)
MCV RBC AUTO: 87 FL (ref 82–98)
MONOCYTES # BLD AUTO: 0.42 THOUSAND/ÂΜL (ref 0.17–1.22)
MONOCYTES NFR BLD AUTO: 8 % (ref 4–12)
NEUTROPHILS # BLD AUTO: 2.84 THOUSANDS/ÂΜL (ref 1.85–7.62)
NEUTS SEG NFR BLD AUTO: 53 % (ref 43–75)
NONHDLC SERPL-MCNC: 120 MG/DL
NRBC BLD AUTO-RTO: 0 /100 WBCS
PLATELET # BLD AUTO: 254 THOUSANDS/UL (ref 149–390)
PMV BLD AUTO: 10.9 FL (ref 8.9–12.7)
POTASSIUM SERPL-SCNC: 4.1 MMOL/L (ref 3.5–5.3)
PROT SERPL-MCNC: 7.3 G/DL (ref 6.4–8.4)
RBC # BLD AUTO: 4.99 MILLION/UL (ref 3.81–5.12)
SODIUM SERPL-SCNC: 141 MMOL/L (ref 135–147)
TRIGL SERPL-MCNC: 56 MG/DL (ref ?–150)
TSH SERPL DL<=0.05 MIU/L-ACNC: 1.34 UIU/ML (ref 0.45–4.5)
WBC # BLD AUTO: 5.33 THOUSAND/UL (ref 4.31–10.16)

## 2025-03-20 PROCEDURE — 83036 HEMOGLOBIN GLYCOSYLATED A1C: CPT

## 2025-03-20 PROCEDURE — 85025 COMPLETE CBC W/AUTO DIFF WBC: CPT

## 2025-03-20 PROCEDURE — 36415 COLL VENOUS BLD VENIPUNCTURE: CPT

## 2025-03-20 PROCEDURE — 80053 COMPREHEN METABOLIC PANEL: CPT

## 2025-03-20 PROCEDURE — 80061 LIPID PANEL: CPT

## 2025-03-20 PROCEDURE — 84443 ASSAY THYROID STIM HORMONE: CPT

## 2025-03-21 ENCOUNTER — RESULTS FOLLOW-UP (OUTPATIENT)
Age: 45
End: 2025-03-21

## 2025-04-04 ENCOUNTER — PROCEDURE VISIT (OUTPATIENT)
Dept: PAIN MEDICINE | Facility: CLINIC | Age: 45
End: 2025-04-04
Payer: COMMERCIAL

## 2025-04-04 VITALS — BODY MASS INDEX: 21.34 KG/M2 | HEIGHT: 64 IN | WEIGHT: 125 LBS

## 2025-04-04 DIAGNOSIS — M79.18 MYOFASCIAL PAIN SYNDROME: Primary | ICD-10-CM

## 2025-04-04 PROCEDURE — 76942 ECHO GUIDE FOR BIOPSY: CPT | Performed by: STUDENT IN AN ORGANIZED HEALTH CARE EDUCATION/TRAINING PROGRAM

## 2025-04-04 PROCEDURE — 20553 NJX 1/MLT TRIGGER POINTS 3/>: CPT | Performed by: STUDENT IN AN ORGANIZED HEALTH CARE EDUCATION/TRAINING PROGRAM

## 2025-04-04 RX ORDER — BUPIVACAINE HYDROCHLORIDE 2.5 MG/ML
4 INJECTION, SOLUTION EPIDURAL; INFILTRATION; INTRACAUDAL; PERINEURAL ONCE
Status: COMPLETED | OUTPATIENT
Start: 2025-04-04 | End: 2025-04-04

## 2025-04-04 RX ORDER — METHYLPREDNISOLONE ACETATE 40 MG/ML
40 INJECTION, SUSPENSION INTRA-ARTICULAR; INTRALESIONAL; INTRAMUSCULAR; SOFT TISSUE ONCE
Status: COMPLETED | OUTPATIENT
Start: 2025-04-04 | End: 2025-04-04

## 2025-04-04 RX ADMIN — BUPIVACAINE HYDROCHLORIDE 4 ML: 2.5 INJECTION, SOLUTION EPIDURAL; INFILTRATION; INTRACAUDAL; PERINEURAL at 14:27

## 2025-04-04 RX ADMIN — METHYLPREDNISOLONE ACETATE 40 MG: 40 INJECTION, SUSPENSION INTRA-ARTICULAR; INTRALESIONAL; INTRAMUSCULAR; SOFT TISSUE at 14:27

## 2025-04-21 ENCOUNTER — TELEPHONE (OUTPATIENT)
Age: 45
End: 2025-04-21

## 2025-04-21 NOTE — TELEPHONE ENCOUNTER
S/w pt. Pt is requesting to cancel her JOSE for tomorrow 4/22. Per pt her ROM has improved and she is having relief from the TENZ unit and prefers to continue using to see how it goes before having a JOSE.    Pt is asking about repeating an EMG?  Per pt she discussed this with Dr bruno    Please advise regarding repeating EMG-

## 2025-04-21 NOTE — TELEPHONE ENCOUNTER
Caller: dontrell Contreras    Doctor: Dr. Jackman    Reason for call: pt called with her % of improvement from last procedure and also wanted to know since her pain is not a 10 does she needs to keep the procedure for tomorrow    Call back#: [phone]      % of improvement (0-100%) and current pain level 8-9/10 pain(0-10)    Improvement with ROM     Pt is still experiencing numbness in the hands.  She is still having pain in shoulders down arms down to fingers with arm heaviness    Pt has also been using a TENS unit and heat alternating with ice    Pt is has a procedure JOSE scheduled for tomorrow and she is wondering if she needs the procedure

## 2025-04-22 NOTE — TELEPHONE ENCOUNTER
Had EMG of LUE in 2023 showing cervical radiculopathy. Repeating the test for her left arm symptoms will likely not . Can proceed with cancelling JOSE. I would have her make 4w OV with MG to reevaluate.

## 2025-04-25 ENCOUNTER — TELEPHONE (OUTPATIENT)
Dept: PAIN MEDICINE | Facility: CLINIC | Age: 45
End: 2025-04-25

## 2025-04-25 NOTE — TELEPHONE ENCOUNTER
Tried to call patient to make an appointment with MG had to leave a message for the patient provided call back number [phone]

## 2025-05-20 ENCOUNTER — OFFICE VISIT (OUTPATIENT)
Dept: PAIN MEDICINE | Facility: CLINIC | Age: 45
End: 2025-05-20
Payer: COMMERCIAL

## 2025-05-20 VITALS — BODY MASS INDEX: 21.34 KG/M2 | WEIGHT: 125 LBS | HEIGHT: 64 IN

## 2025-05-20 DIAGNOSIS — M50.30 DDD (DEGENERATIVE DISC DISEASE), CERVICAL: ICD-10-CM

## 2025-05-20 DIAGNOSIS — M79.18 MYOFASCIAL PAIN SYNDROME: ICD-10-CM

## 2025-05-20 DIAGNOSIS — M54.2 NECK PAIN: ICD-10-CM

## 2025-05-20 DIAGNOSIS — M54.12 CERVICAL RADICULOPATHY: Primary | ICD-10-CM

## 2025-05-20 PROCEDURE — 99214 OFFICE O/P EST MOD 30 MIN: CPT

## 2025-05-20 NOTE — PATIENT INSTRUCTIONS
Muscle Spasm   WHAT YOU NEED TO KNOW:   A muscle spasm is a sudden contraction of any muscle or group of muscles. A muscle cramp is a painful muscle spasm. Muscle cramps commonly occur after intense exercise or during pregnancy. They may also be caused by certain medications, dehydration, low calcium or magnesium levels, or another medical condition.   DISCHARGE INSTRUCTIONS:   Medicines:  You may need the following:  NSAIDs  help decrease swelling and pain or fever. This medicine is available with or without a doctor's order. NSAIDs can cause stomach bleeding or kidney problems in certain people. If you take blood thinner medicine, always ask your healthcare provider if NSAIDs are safe for you. Always read the medicine label and follow directions.    Take your medicine as directed.  Contact your healthcare provider if you think your medicine is not helping or if you have side effects. Tell him of her if you are allergic to any medicine. Keep a list of the medicines, vitamins, and herbs you take. Include the amounts, and when and why you take them. Bring the list or the pill bottles to follow-up visits. Carry your medicine list with you in case of an emergency.    Follow up with your healthcare provider as directed:  You may need other tests or treatment. You may also be referred to a physical therapist or other specialist. Write down your questions so you remember to ask them during your visits.   Self-care:   Stretch  your muscle to help relieve the cramp. It may be helpful to keep your muscle in the stretched position until the cramp is gone.     Apply heat  to help decrease pain and muscle spasms. Apply heat on the area for 20 to 30 minutes every 2 hours for as many days as directed.     Apply ice  to help decrease swelling and pain. Ice may also help prevent tissue damage. Use an ice pack, or put crushed ice in a plastic bag. Cover it with a towel and place it on your muscle for 15 to 20 minutes every hour or  as directed.    Drink more liquids  to help prevent muscle cramps caused by dehydration. Sports drinks may help replace electrolytes you lose through sweat during exercise. Ask your healthcare provider how much liquid to drink each day and which liquids are best for you.     Eat healthy foods , such as fruits, vegetables, whole grains, low-fat dairy products, and lean proteins (meat, beans, and fish). If you are pregnant, ask your healthcare provider about foods that are high in magnesium and sodium. They may help to relieve cramps during pregnancy.     Massage your muscle  to help relieve the cramp.     Take frequent deep breaths  until the cramp feels better. Lie down while you take the deep breaths so you do not get dizzy or lightheaded.    Contact your healthcare provider if:   You have signs of dehydration, such as a headache, dark yellow urine, dry eyes or mouth, or a fast heartbeat.     You have questions or concerns about your condition or care.    Return to the emergency department if:   You have warmth, swelling, or redness in the cramping muscle.     You have frequent or unrelieved muscle cramps in several different muscles.     You have muscle cramps with numbness, tingling, and burning in your hands and feet.    © Copyright [employer] 2022 Information is for End User's use only and may not be sold, redistributed or otherwise used for commercial purposes. All illustrations and images included in CareNotes® are the copyrighted property of [employer] or [employer]  The above information is an  only. It is not intended as medical advice for individual conditions or treatments. Talk to your doctor, nurse or pharmacist before following any medical regimen to see if it is safe and effective for you.

## 2025-05-20 NOTE — PROGRESS NOTES
Pain Medicine Follow-Up Note    Assessment:  1. Cervical radiculopathy    2. Myofascial pain syndrome    3. Neck pain    4. DDD (degenerative disc disease), cervical        Plan:  Orders Placed This Encounter   Procedures   • Durable Medical Equipment     Pneumatic cervical traction collar   • Ambulatory referral to Physical Therapy     Standing Status:   Future     Expiration Date:   5/20/2026     Referral Priority:   Routine     Referral Type:   Physical Therapy     Referral Reason:   Specialty Services Required     Requested Specialty:   Physical Therapy     Number of Visits Requested:   1     Expiration Date:   5/20/2026       My impressions and treatment recommendations were discussed in detail with the patient who verbalized understanding and had no further questions.      Patient returns to the office after having trigger point injections into her cervical paraspinal/trapezius muscles on 4/4/2025, patient is reporting ongoing pain relief in this area but continues to have tingling and numbness going down her left arm into her hands.  Patient describes it as a pressure-like sensation.  Did discuss a diagnostic cervical epidural steroid injection however patient wishes to hold off on something this invasive.  An EMG study showed C6-C7 radiculopathy on the left.  I recommend the patient trial a pneumatic traction collar and revisit physical therapy.  Advised the patient that if her pain should worsen, and her muscles tighten she should contact the office for repeat trigger point injections.    In the past the patient has been on many different medications including gabapentin, pregabalin, duloxetine, duloxetine, methocarbamol, tramadol etc.  Patient is not interested in prescription medications at this time.  Advised her to ensure she is consuming a nutritious diet, she can supplement with magnesium, and a vitamin B complex which will help with muscle and nerve health.  Patient appreciates.    Follow-up is  planned in as needed time or sooner as warranted.  Discharge instructions were provided. I personally saw and examined the patient and I agree with the above discussed plan of care.    History of Present Illness:    Ben Rivas is a 44 y.o. female who presents to [facility] for interval re-evaluation of the above stated pain complaints. The patient has a past medical and chronic pain history as outlined in the assessment section. She was last seen on 4/4/2025.    At today's visit patient states that their pain symptoms are better with a pain score of 1/10 on the verbal numeric pain scale.  The patient's pain is worse in the evening.  The patient's pain is occasional in nature.  And the quality of the patient's pain is described as dull-aching, throbbing, cramping, pressure-like, and numbness and a tingling sensation.  The patient's pain is located in the head, neck, radiating down her left arm into her hand.      Other than as stated above, the patient denies any interval changes in medications, medical condition, mental condition, symptoms, or allergies since the last office visit.         Review of Systems:    Review of Systems   Respiratory:  Negative for shortness of breath.    Cardiovascular:  Negative for chest pain.   Gastrointestinal:  Positive for constipation. Negative for diarrhea, nausea and vomiting.   Musculoskeletal:  Positive for arthralgias and gait problem. Negative for joint swelling and myalgias.        Pain in the left arm, decreased range of motion   Skin:  Negative for rash.   Neurological:  Positive for dizziness. Negative for seizures and weakness.   All other systems reviewed and are negative.        Past Medical History:   Diagnosis Date   • Acne 12/14/2015   • Allergic    • Anxiety    • Asthma    • Depression    • Eustachian tube dysfunction 12/19/2017   • External hemorrhoids 11/25/2014   • Femoral hernia with obstruction    • Fibromyalgia, primary  "1/26/2012   • Gonorrhea 11/15/2021   • Headache(784.0) 11/10/2023   • Headache, tension-type    • Hemorrhoids 12/14/2015   • History of cervical dysplasia 03/02/2020   • Increased frequency of urination 04/11/2023   • Irregular menses    • Irregular periods 11/11/2021   • Joint pain 05/16/2014   • Malaise and fatigue    • Migraine 06/30/2023    Migraine with aura   • Neurogenic pain    • Ovarian cyst 06/19/2017   • Pelvic floor tension 11/21/2022   • 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HCC)    • Sleep apnea    • Thymus disorder (HCC)     last assessed 4/17/14; resolved 7/2/15   • Vaginal burning 06/19/2017       Past Surgical History:   Procedure Laterality Date   • DILATION AND CURETTAGE OF UTERUS     • NO PAST SURGERIES     • OSTEOTOMY  12/2020       Family History   Problem Relation Age of Onset   • Asthma Mother    • Hyperlipidemia Mother    • Hypertension Mother    • Obesity Mother    • Neuropathy Mother    • Hyperlipidemia Father    • Hypertension Father    • Diabetes type II Father    • Heart disease Father         valvular   • Migraines Maternal Aunt        Social History     Occupational History   • Occupation: marketing    Tobacco Use   • Smoking status: Never     Passive exposure: Past   • Smokeless tobacco: Never   Vaping Use   • Vaping status: Never Used   Substance and Sexual Activity   • Alcohol use: No   • Drug use: No   • Sexual activity: Yes     Partners: Male     Birth control/protection: Condom Male       Current Medications[1]    Allergies[2]    Physical Exam:    Ht 5' 4\" (1.626 m)   Wt 56.7 kg (125 lb)   LMP 05/18/2025 (Exact Date)   BMI 21.46 kg/m²     Constitutional:normal, well developed, well nourished, alert, in no distress and non-toxic and no overt pain behavior.  Eyes:anicteric  HEENT:grossly intact  Neck:supple, symmetric, trachea midline and no masses   Pulmonary:even and unlabored  Cardiovascular:No edema or pitting edema present  Skin:Normal without rashes or lesions and well " hydrated  Psychiatric:Mood and affect appropriate  Neurologic:Cranial Nerves II-XII grossly intact  Musculoskeletal:normal gait      Orders Placed This Encounter   Procedures   • Durable Medical Equipment   • Ambulatory referral to Physical Therapy     Time spent with patient: 30 min  This document was created using speech voice recognition software.   Grammatical errors, random word insertions, pronoun errors, and incomplete sentences are an occasional consequence of this system due to software limitations, ambient noise, and hardware issues.   Any formal questions or concerns about content, text, or information contained within the body of this dictation should be directly addressed to the provider for clarification.           [1]    Current Outpatient Medications:   •  albuterol (PROVENTIL HFA,VENTOLIN HFA) 90 mcg/act inhaler, Inhale 2 puffs every 6 (six) hours as needed for wheezing, Disp: 6.7 g, Rfl: 1  •  Bioflavonoid Products (Suyapa-C) 500-550 MG TABS, Take 1,000 mg by mouth, Disp: , Rfl:   •  Cholecalciferol 50 MCG (2000 UT) CAPS, Take 3 capsules by mouth 37799 units daily liquid, Disp: , Rfl:   •  clotrimazole-betamethasone (LOTRISONE) 1-0.05 % cream, Apply topically 2 (two) times a day as needed (itching), Disp: 30 g, Rfl: 0  •  erythromycin (ILOTYCIN) ophthalmic ointment, , Disp: , Rfl:   •  Flarex 0.1 % ophthalmic suspension, , Disp: , Rfl:   •  fluconazole (DIFLUCAN) 150 mg tablet, Take 150 mg by mouth once, Disp: , Rfl:   •  fluticasone (FLONASE) 50 mcg/act nasal spray, USE 1 SPRAY IN EACH NOSTRIL TWICE A DAY WITH MEALS, Disp: 16 mL, Rfl: 3  •  Ibuprofen-Acetaminophen (Acetaminophen-Ibuprofen) 125-250 MG TABS, Take by mouth, Disp: , Rfl:   •  nitrofurantoin (MACROBID) 100 mg capsule, Take 1 capsule (100 mg total) by mouth 2 (two) times a day, Disp: 14 capsule, Rfl: 0  •  rizatriptan (MAXALT-MLT) 5 mg disintegrating tablet, Take 1 tablet (5 mg total) by mouth once as needed for migraine for up to 1 dose  may repeat in 2 hours if necessary, Disp: 10 tablet, Rfl: 5[2]  No Known Allergies